# Patient Record
Sex: FEMALE | Race: WHITE | Employment: OTHER | URBAN - METROPOLITAN AREA
[De-identification: names, ages, dates, MRNs, and addresses within clinical notes are randomized per-mention and may not be internally consistent; named-entity substitution may affect disease eponyms.]

---

## 2017-01-09 ENCOUNTER — TELEPHONE (OUTPATIENT)
Dept: FAMILY MEDICINE CLINIC | Age: 77
End: 2017-01-09

## 2017-01-09 NOTE — TELEPHONE ENCOUNTER
----- Message from Anca Villa sent at 1/9/2017 12:44 PM EST -----  Regarding: Dr. Mateusz Sargent  Pt stated she needs a referral for an upper endoscopy  for 02/03/17 at 8:30 a.m. faxed to Dr. Hanna Fowler on file)  Best contact number [phone].

## 2017-01-25 ENCOUNTER — DOCUMENTATION ONLY (OUTPATIENT)
Dept: SLEEP MEDICINE | Age: 77
End: 2017-01-25

## 2017-01-26 ENCOUNTER — TELEPHONE (OUTPATIENT)
Dept: SLEEP MEDICINE | Age: 77
End: 2017-01-26

## 2017-01-26 NOTE — TELEPHONE ENCOUNTER
Spoke w/ patient regarding scheduled pressure adjustment for 1/27/17. She reports recently seeing Dr. Reji Richmond (Pulmonogist) for issues other than sleep. During that visit he requested her CPAP machine be adjusted to Auto CPAP 7cm-10cm (see media 12/18/16). It was also found that a follow-up with her sleep physician would be necessary. Patient has remote accessibility on her CPAP device. Per Dr. Berna Rojo request, pressure was adjusted to 7cm-10cm. Pressure adjustment appointment for 1/27/17 was canceled. We will reschedule a follow-up with her sleep physician at patients earliest convenience.

## 2017-01-27 NOTE — TELEPHONE ENCOUNTER
Spoke with patient about scheduling appointment for follow up. She said she wants to see how the pressure change goes for couple months and call us back to schedule office visit appointment.

## 2017-02-02 ENCOUNTER — TELEPHONE (OUTPATIENT)
Dept: SLEEP MEDICINE | Age: 77
End: 2017-02-02

## 2017-02-02 NOTE — TELEPHONE ENCOUNTER
Spoke w/ patient regarding adherence to CPAP since recent pressure adjustment. She feels she is sleeping much better \"in one position all night\". Wakes up feeling refreshed. Download reveals improved AHI, snore index, and reduce mask leak. · Patient wishes to continue with CPAP for appx. 2 months before scheduling a follow-up with Dr. Joseluis Hutson. · She was advised to contact the sleep center if she has any further complications with CPAP prior to her next appointment.

## 2017-03-07 ENCOUNTER — TELEPHONE (OUTPATIENT)
Dept: SLEEP MEDICINE | Age: 77
End: 2017-03-07

## 2017-03-07 NOTE — TELEPHONE ENCOUNTER
Called Ms Juan Mcleodeleazar to see if she wanted to schedule yearly f/u for cpap as she had not been since since 9/2015. Patient said she was find now and did not want to schedule one at this time. She said she orders her supplies online and she has had no problem so far. She said she will call if she need to make appointment.

## 2017-04-18 ENCOUNTER — OFFICE VISIT (OUTPATIENT)
Dept: FAMILY MEDICINE CLINIC | Age: 77
End: 2017-04-18

## 2017-04-18 VITALS
RESPIRATION RATE: 18 BRPM | OXYGEN SATURATION: 98 % | DIASTOLIC BLOOD PRESSURE: 80 MMHG | HEART RATE: 74 BPM | WEIGHT: 231 LBS | TEMPERATURE: 98.3 F | SYSTOLIC BLOOD PRESSURE: 129 MMHG | BODY MASS INDEX: 42.51 KG/M2 | HEIGHT: 62 IN

## 2017-04-18 DIAGNOSIS — M47.9 OSTEOARTHRITIS OF SPINE, UNSPECIFIED SPINAL OSTEOARTHRITIS COMPLICATION STATUS, UNSPECIFIED SPINAL REGION: ICD-10-CM

## 2017-04-18 DIAGNOSIS — Z13.31 SCREENING FOR DEPRESSION: ICD-10-CM

## 2017-04-18 DIAGNOSIS — M62.838 MUSCLE SPASM: ICD-10-CM

## 2017-04-18 DIAGNOSIS — M54.5 LOW BACK PAIN, UNSPECIFIED BACK PAIN LATERALITY, UNSPECIFIED CHRONICITY, WITH SCIATICA PRESENCE UNSPECIFIED: Primary | ICD-10-CM

## 2017-04-18 RX ORDER — METHYLPREDNISOLONE 4 MG/1
TABLET ORAL
Qty: 1 DOSE PACK | Refills: 0 | Status: SHIPPED | OUTPATIENT
Start: 2017-04-18 | End: 2017-12-19 | Stop reason: ALTCHOICE

## 2017-04-18 NOTE — PROGRESS NOTES
Chief Complaint   Patient presents with    Back Pain     back pain x 6- 7 months off and on, pain level 10/10     1. Have you been to the ER, urgent care clinic since your last visit? Hospitalized since your last visit? No    2. Have you seen or consulted any other health care providers outside of the [address] [place] [address] since your last visit? Include any pap smears or colon screening. No     Reviewed record in preparation for visit and have obtained necessary documentation.

## 2017-04-18 NOTE — PATIENT INSTRUCTIONS
Take medrol with food    Take tylenol for pain    Apply renetta camarena to your back    Moist heat    See back specialist Dr. Lexi Urbina #[phone]    Consider physical therapy    [facility] #[phone] or  [facility] #[phone]  Physical Therapy evaluate and treat:  Back pain    Please call Nanette to help arrange and authorize your tests and/or referrals.   Her # is [phone]

## 2017-04-18 NOTE — LETTER
4/18/2017 2:18 PM 
 
Ms. Parish Saba [address] [facility] [place] [facility] [id number] Body mass index is 42.25 kg/(m^2). Focus on regular exercise (150 minutes each week) and healthy eating. Eat more fruits and vegetables. Eat more protein (egg whites, beans, and nuts you know you tolerate) and less carbohydrates (white bread, white rice, white pasta, white potatoes, sodas, and sweets). Eat appropriately small portion sizes. Sincerely, Cecilio Philip MD

## 2017-04-18 NOTE — MR AVS SNAPSHOT
Visit Information Date & Time Provider Department Dept. Phone Encounter #  
 4/18/2017  2:15 PM Miller Chan MD [address] [facility] [phone] [id number] Your Appointments 4/18/2017  2:15 PM  
ROUTINE CARE with Miller Chan MD  
[address] [facility]) Appt Note: muscle spasms in back $0CP yd 04/17/17  
 [address] [place] [address]  
[phone]  
  
   
 [address] [phone] Upcoming Health Maintenance Date Due  
 MEDICARE YEARLY EXAM 12/7/2017 GLAUCOMA SCREENING Q2Y 8/9/2018 COLONOSCOPY 8/9/2021 DTaP/Tdap/Td series (2 - Td) 8/9/2026 Allergies as of 4/18/2017  Review Complete On: 4/18/2017 By: Miller Chan MD  
  
 Severity Noted Reaction Type Reactions Bactrim [Sulfamethoprim]  03/22/2012    Swelling Lipitor [Atorvastatin]  07/15/2010    Other (comments) Aches Roxicodone [Oxycodone]  03/22/2012    Swelling Tetanus-diphtheria Toxoids-td  07/15/2010    Swelling Allergic to Td? Welchol [Colesevelam]  07/15/2010    Other (comments) Aches Current Immunizations  Reviewed on 2/9/2016 Name Date Influenza Vaccine 9/22/2016, 10/12/2015, 10/18/2014 Influenza Vaccine Split 10/9/2012 Pneumococcal Conjugate (PCV-13) 9/22/2016 Pneumococcal Vaccine (Pcv) 10/9/2008, 2/2/1998 Not reviewed this visit You Were Diagnosed With   
  
 Codes Comments Low back pain, unspecified back pain laterality, unspecified chronicity, with sciatica presence unspecified    -  Primary ICD-10-CM: M54.5 ICD-9-CM: 724.2 Muscle spasm     ICD-10-CM: O16.456 ICD-9-CM: 728.85 Vitals BP Pulse Temp Resp Height(growth percentile) Weight(growth percentile) 129/80 (BP 1 Location: Left arm, BP Patient Position: Sitting) 74 98.3 °F (36.8 °C) (Oral) 18 5' 2\" (1.575 m) 231 lb (104.8 kg) LMP SpO2 BMI OB Status Smoking Status 05/19/1989 98% 42.25 kg/m2 Postmenopausal Former Smoker Vitals History BMI and BSA Data Body Mass Index Body Surface Area  
 42.25 kg/m 2 2.14 m 2 Preferred Pharmacy Pharmacy Name Phone [facility] [address] [phone] Your Updated Medication List  
  
   
This list is accurate as of: 4/18/17  2:11 PM.  Always use your most recent med list.  
  
  
  
  
 ALPRAZolam 0.5 mg tablet Commonly known as:  XANAX  
TAKE 1 TABLET BY MOUTH 3 TIMES A DAY as needed  
  
 amLODIPine 2.5 mg tablet Commonly known as:  Tamara Punter Take 1 Tab by mouth daily. Indications: Hypertension  
  
 aspirin 81 mg chewable tablet Take 1 Tab by mouth daily. cholecalciferol 1,000 unit Cap Commonly known as:  VITAMIN D3 Take 2,000 Units by mouth daily. FLUoxetine 10 mg capsule Commonly known as:  PROzac TAKE 1 CAPSULE BY MOUTH EVERY DAY  
  
 fluticasone 50 mcg/actuation nasal spray Commonly known as:  Bill Settles INHALE 2 PUFFS INTO EACH NOSTRIL DAILY INCRUSE ELLIPTA 62.5 mcg/actuation inhaler Generic drug:  umeclidinium Take 1 Puff by mouth daily. losartan 100 mg tablet Commonly known as:  COZAAR  
TAKE 1 TABLET BY MOUTH DAILY. methylPREDNISolone 4 mg tablet Commonly known as:  Li Everettall Take with food MULTIPLE VITAMIN PO Take  by mouth.  
  
 pantoprazole 40 mg tablet Commonly known as:  PROTONIX Take 1 Tab by mouth daily. TYLENOL ARTHRITIS PAIN 650 mg CR tablet Generic drug:  acetaminophen Take 650 mg by mouth four (4) times daily. Prescriptions Sent to Pharmacy Refills  
 methylPREDNISolone (MEDROL DOSEPACK) 4 mg tablet 0 Sig: Take with food Class: Normal  
 Pharmacy: [facility] [facility] Ph #: [phone] We Performed the Following REFERRAL TO ORTHOPEDIC SURGERY [REF62 Custom] Comments: Please evaluate patient for back pain. REFERRAL TO PHYSICAL THERAPY [MND12 Custom] Comments:  
 Please evaluate patient for back pain. Please call Adela Abraham to help arrange and authorize any tests and/or referrals. Her # is [phone] Referral Information Referral ID Referred By Referred To  
  
 [id number] Delfina Pillai of [address] [place], [address] [place] [address] Phone: [phone] Fax: [phone] Visits Status Start Date End Date 1 New Request 4/18/17 4/18/18 If your referral has a status of pending review or denied, additional information will be sent to support the outcome of this decision. Referral ID Referred By Referred To  
 [id number] Samuel Cardenas Not Available Visits Status Start Date End Date 1 New Request 4/18/17 4/18/18 If your referral has a status of pending review or denied, additional information will be sent to support the outcome of this decision. Patient Instructions Take medrol with food Take tylenol for pain Apply renetta camarena to your back Moist heat See back specialist  [facility] #[phone] Consider physical therapy [facility] #[phone] or [facility] #[phone] Physical Therapy evaluate and treat:  Back pain Please call Adela Abraham to help arrange and authorize your tests and/or referrals. Her # is [phone] Introducing \[facility]\"" & HEALTH SERVICES! Juancho Butterfield introduces [employer] patient portal. Now you can access parts of your medical record, email your doctor's office, and request medication refills online. 1. In your internet browser, go to [email or web address] 2. Click on the First Time User? Click Here link in the Sign In box. You will see the New Member Sign Up page. 3. Enter your [employer] Access Code exactly as it appears below. You will not need to use this code after youve completed the sign-up process.  If you do not sign up before the expiration date, you must request a new code. · [employer] Access Code: [email or web address] Expires: 7/17/2017  2:11 PM 
 
4. Enter the last four digits of your Social Security Number (xxxx) and Date of Birth (mm/dd/yyyy) as indicated and click Submit. You will be taken to the next sign-up page. 5. Create a [employer] ID. This will be your [employer] login ID and cannot be changed, so think of one that is secure and easy to remember. 6. Create a [employer] password. You can change your password at any time. 7. Enter your Password Reset Question and Answer. This can be used at a later time if you forget your password. 8. Enter your e-mail address. You will receive e-mail notification when new information is available in [address] 9. Click Sign Up. You can now view and download portions of your medical record. 10. Click the Download Summary menu link to download a portable copy of your medical information. If you have questions, please visit the Frequently Asked Questions section of the [employer] website. Remember, [employer] is NOT to be used for urgent needs. For medical emergencies, dial 911. Now available from your iPhone and Android! Please provide this summary of care documentation to your next provider. Your primary care clinician is listed as Naif Cash. If you have any questions after today's visit, please call [phone].

## 2017-04-18 NOTE — PROGRESS NOTES
Reports dislocating right shoulder about 1.5 hours ago. States she put it back in, but the pain is still 6/10 after taking ibuprofen at 1300   Shiv Galo is a 78 y.o. female      Issues discussed today include:        Signs and symptoms:  Low back pain  Duration:  3 days  Context:  No falls but she was leaning over the sink peeling potatoes  Location:  Low back  Quality:  spasm  Severity:  8/10  Timing:  dialy for 3 days  Modifying factors:  She's had back issues before, did not respond to PT    Data reviewed or ordered today:  XR 12/2016  Frontal, lateral, and lumbosacral radiographs. Lumbar is age of S1 is a normal  variant. Lumbar alignment and vertebral body heights are normal. Degenerative  disc disease is mild, and greatest at L1-L2. L3-S1 facet osteoarthritis is mild. Mild degenerative disease is minimally increased from 2006.     IMPRESSION  IMPRESSION:  Mild degenerative disease. Other problems include:  Patient Active Problem List   Diagnosis Code    Pseudogout M11.20    Depression F32.9    GERD (gastroesophageal reflux disease) K21.9    ALESIA (obstructive sleep apnea) G47.33    Chest pain R07.9    History of normal resting EKG [id number]    Colon polyp K63.5    S/P JESSICA-BSO Z90.710, Z90.722, Z90.79    Essential hypertension with goal blood pressure less than 140/90 I10    Hyperglycemia R73.9    Knee pain, right M25.561    Osteoarthritis M19.90    Lipid disorder E78.9    Osteoporosis M81.0    Advance directive discussed with patient Z70.80    Health care maintenance Z00.00    Urinary incontinence R32    Anxiety F41.9    Hypercalcemia E83.52    Allergic to tetanus vaccine Z88.7       Medications:  Current Outpatient Prescriptions   Medication Sig Dispense Refill    methylPREDNISolone (MEDROL DOSEPACK) 4 mg tablet Take with food 1 Dose Pack 0    ALPRAZolam (XANAX) 0.5 mg tablet TAKE 1 TABLET BY MOUTH 3 TIMES A DAY as needed 60 Tab 1    losartan (COZAAR) 100 mg tablet TAKE 1 TABLET BY MOUTH DAILY. 90 Tab 3    amLODIPine (NORVASC) 2.5 mg tablet Take 1 Tab by mouth daily.  Indications: Hypertension 90 Tab 3    pantoprazole (PROTONIX) 40 mg tablet Take 1 Tab by mouth daily. 90 Tab 3    FLUoxetine (PROZAC) 10 mg capsule TAKE 1 CAPSULE BY MOUTH EVERY DAY 90 Cap 3    fluticasone (FLONASE) 50 mcg/actuation nasal spray INHALE 2 PUFFS INTO EACH NOSTRIL DAILY 1 Bottle 11    INCRUSE ELLIPTA 62.5 mcg/actuation inhaler Take 1 Puff by mouth daily. 6    cholecalciferol (VITAMIN D3) 1,000 unit cap Take 2,000 Units by mouth daily.  acetaminophen (TYLENOL ARTHRITIS PAIN) 650 mg CR tablet Take 650 mg by mouth four (4) times daily.  aspirin 81 mg chewable tablet Take 1 Tab by mouth daily. 100 Tab 11    MULTIVITAMINS (MULTIPLE VITAMIN PO) Take  by mouth. Allergies: Allergies   Allergen Reactions    Bactrim [Sulfamethoprim] Swelling    Lipitor [Atorvastatin] Other (comments)     Aches      Roxicodone [Oxycodone] Swelling    Tetanus-Diphtheria Toxoids-Td Swelling     Allergic to Td?  Welchol [Colesevelam] Other (comments)     Aches         LMP:  Patient's last menstrual period was 05/19/1989. Social History     Social History    Marital status: SINGLE     Spouse name: N/A    Number of children: N/A    Years of education: N/A     Occupational History    Not on file.      Social History Main Topics    Smoking status: Former Smoker     Packs/day: 0.50     Quit date: 9/7/2010    Smokeless tobacco: Never Used    Alcohol use No      Comment: RARE    Drug use: No    Sexual activity: No     Other Topics Concern    Not on file     Social History Narrative         Family History   Problem Relation Age of Onset    Diabetes Father     Arthritis-rheumatoid Father     Heart Disease Father     Cancer Maternal Aunt     Breast Cancer Maternal Aunt     Cancer Other     Breast Cancer Sister     Breast Cancer Maternal Aunt          Meaningful use:  done      ROS:  Headaches:  no  Chest Pain:  no  SOB:  no  Fevers:  no  Falls:  no  anxiety/depression/losing interest in doing things that were previously enjoyed:  PHQ9 = 8, she declined adjusting Rx  Other significant ROS:      Patient's last menstrual period was 05/19/1989.     Physical Exam  Visit Vitals    /80 (BP 1 Location: Left arm, BP Patient Position: Sitting)    Pulse 74    Temp 98.3 °F (36.8 °C) (Oral)    Resp 18    Ht 5' 2\" (1.575 m)    Wt 231 lb (104.8 kg)    LMP 05/19/1989    SpO2 98%    BMI 42.25 kg/m2     BP Readings from Last 3 Encounters:   04/18/17 129/80   12/06/16 184/78   10/22/16 126/76     Constitutional:  Appears well,  No Acute Distress, Vitals noted  Psychiatric:   Affect normal, Alert and cooperative, Oriented to person/place/time    no tenderness over C-spine, T-spine, L-spine    Only fair flexibility of spine demonstrated     strength of left leg and right leg seem normal    squats OK, heel standing/toe standing OK given pain    no tenderness over right or left SI joint    no tenderness over left or right trochanteric bursa     straight leg raising is normal for right and left leg    full range of motion at both hips without pain    abdominal exam shows no bruits or masses            Assessment:    Patient Active Problem List   Diagnosis Code    Pseudogout M11.20    Depression F32.9    GERD (gastroesophageal reflux disease) K21.9    ALESIA (obstructive sleep apnea) G47.33    Chest pain R07.9    History of normal resting EKG [id number]    Colon polyp K63.5    S/P JESSICA-BSO Z90.710, Z90.722, Z90.79    Essential hypertension with goal blood pressure less than 140/90 I10    Hyperglycemia R73.9    Knee pain, right M25.561    Osteoarthritis M19.90    Lipid disorder E78.9    Osteoporosis M81.0    Advance directive discussed with patient Z70.80    Health care maintenance Z00.00    Urinary incontinence R32    Anxiety F41.9    Hypercalcemia E83.52    Allergic to tetanus vaccine Z88.7       Today's diagnoses are:    ICD-10-CM ICD-9-CM    1. Low back pain, unspecified back pain laterality, unspecified chronicity, with sciatica presence unspecified M54.5 724.2 REFERRAL TO ORTHOPEDIC SURGERY      REFERRAL TO PHYSICAL THERAPY   2. Muscle spasm M62.838 728.85    3. Osteoarthritis of spine, unspecified spinal osteoarthritis complication status, unspecified spinal region M47.9 721.90        Plan:  Orders Placed This Encounter    REFERRAL TO ORTHOPEDIC SURGERY     Referral Priority:   Routine     Referral Type:   Consultation     Referral Reason:   Specialty Services Required     Referral Location:   [facility]     Referred to Provider:   Aung Shannon MD     Requested Specialty:   Orthopedic Surgery    REFERRAL TO PHYSICAL THERAPY     Referral Priority:   Routine     Referral Type:   PT/OT/ST     Referral Reason:   Specialty Services Required     Requested Specialty:   Physical Therapy    methylPREDNISolone (MEDROL DOSEPACK) 4 mg tablet     Sig: Take with food     Dispense:  1 Dose Pack     Refill:  0       See patient instructions  Patient Instructions   Take medrol with food    Take tylenol for pain    Apply renetta camarena to your back    Moist heat    See back specialist Dr. Ruslan Talbert #[phone]    Consider physical therapy    [facility] #[phone] or  [facility] #[phone]  Physical Therapy evaluate and treat:  Back pain    Please call Nanette to help arrange and authorize your tests and/or referrals. Her # le [phone]             refresh note:  done    AVS Printed:  done    I have reviewed/discussed the above normal BMI with the patient. I have recommended the following interventions: encourage exercise . [place] [facility]

## 2017-04-27 ENCOUNTER — TELEPHONE (OUTPATIENT)
Dept: FAMILY MEDICINE CLINIC | Age: 77
End: 2017-04-27

## 2017-04-27 NOTE — TELEPHONE ENCOUNTER
----- Message from Melanie Holman sent at 4/27/2017 10:28 AM EDT -----  Regarding: Dr.Jackson Samuel/telephone  Contact: [phone]  Pt is requesting a call back to get a referral for Dr. Griselda Joseph [phone]. Pt has appt on 5/3/17. Pt's best contact number is [phone]

## 2017-05-04 ENCOUNTER — TELEPHONE (OUTPATIENT)
Dept: FAMILY MEDICINE CLINIC | Age: 77
End: 2017-05-04

## 2017-05-04 NOTE — TELEPHONE ENCOUNTER
----- Message from Katherine King sent at 5/4/2017 11:43 AM EDT -----  Regarding: Dr. Cecilio Floyd  Pt requested a referral to JULISA SHAW [facility](pain mgmt) for an appt on 05/17/17 at 8:00 a.m. Faxed to . Best contact number [phone].

## 2017-05-16 ENCOUNTER — OFFICE VISIT (OUTPATIENT)
Dept: FAMILY MEDICINE CLINIC | Age: 77
End: 2017-05-16

## 2017-05-16 VITALS
HEART RATE: 86 BPM | SYSTOLIC BLOOD PRESSURE: 149 MMHG | RESPIRATION RATE: 18 BRPM | TEMPERATURE: 98.5 F | BODY MASS INDEX: 42.91 KG/M2 | HEIGHT: 62 IN | WEIGHT: 233.2 LBS | DIASTOLIC BLOOD PRESSURE: 80 MMHG | OXYGEN SATURATION: 92 %

## 2017-05-16 DIAGNOSIS — J01.10 ACUTE NON-RECURRENT FRONTAL SINUSITIS: Primary | ICD-10-CM

## 2017-05-16 DIAGNOSIS — M54.5 LOW BACK PAIN, UNSPECIFIED BACK PAIN LATERALITY, UNSPECIFIED CHRONICITY, WITH SCIATICA PRESENCE UNSPECIFIED: Primary | ICD-10-CM

## 2017-05-16 RX ORDER — AMOXICILLIN AND CLAVULANATE POTASSIUM 875; 125 MG/1; MG/1
1 TABLET, FILM COATED ORAL EVERY 12 HOURS
Qty: 20 TAB | Refills: 0 | Status: SHIPPED | OUTPATIENT
Start: 2017-05-16 | End: 2017-05-26

## 2017-05-16 NOTE — MR AVS SNAPSHOT
Visit Information Date & Time Provider Department Dept. Phone Encounter #  
 5/16/2017  6:15 PM Bautista Braun, [address] [phone] [id number] Follow-up Instructions Return if symptoms worsen or fail to improve. Upcoming Health Maintenance Date Due INFLUENZA AGE 9 TO ADULT 8/1/2017 MEDICARE YEARLY EXAM 12/7/2017 GLAUCOMA SCREENING Q2Y 8/9/2018 COLONOSCOPY 8/9/2021 DTaP/Tdap/Td series (2 - Td) 8/9/2026 Allergies as of 5/16/2017  Review Complete On: 5/16/2017 By: Bautista Braun MD  
  
 Severity Noted Reaction Type Reactions Bactrim [Sulfamethoprim]  03/22/2012    Swelling Lipitor [Atorvastatin]  07/15/2010    Other (comments) Aches Roxicodone [Oxycodone]  03/22/2012    Swelling Tetanus-diphtheria Toxoids-td  07/15/2010    Swelling Allergic to Td? Welchol [Colesevelam]  07/15/2010    Other (comments) Aches Current Immunizations  Reviewed on 2/9/2016 Name Date Influenza Vaccine 9/22/2016, 10/12/2015, 10/18/2014 Influenza Vaccine Split 10/9/2012 Pneumococcal Conjugate (PCV-13) 9/22/2016 Pneumococcal Vaccine (Pcv) 10/9/2008, 2/2/1998 Not reviewed this visit You Were Diagnosed With   
  
 Codes Comments Acute non-recurrent frontal sinusitis    -  Primary ICD-10-CM: J01.10 ICD-9-CM: 863.6 Vitals BP Pulse Temp Resp Height(growth percentile) Weight(growth percentile) 149/80 (BP 1 Location: Left arm, BP Patient Position: Sitting) 86 98.5 °F (36.9 °C) (Oral) 18 5' 2\" (1.575 m) 233 lb 3.2 oz (105.8 kg) LMP SpO2 BMI OB Status Smoking Status 05/19/1989 92% 42.65 kg/m2 Postmenopausal Former Smoker Vitals History BMI and BSA Data Body Mass Index Body Surface Area  
 42.65 kg/m 2 2.15 m 2 Preferred Pharmacy Pharmacy Name Phone [facility] [address] [phone] Your Updated Medication List  
  
   
 This list is accurate as of: 5/16/17  6:56 PM.  Always use your most recent med list.  
  
  
  
  
 ALPRAZolam 0.5 mg tablet Commonly known as:  XANAX  
TAKE 1 TABLET BY MOUTH 3 TIMES A DAY as needed  
  
 amLODIPine 2.5 mg tablet Commonly known as:  Bere Rafter Take 1 Tab by mouth daily. Indications: Hypertension  
  
 amoxicillin-clavulanate 875-125 mg per tablet Commonly known as:  AUGMENTIN Take 1 Tab by mouth every twelve (12) hours for 10 days. aspirin 81 mg chewable tablet Take 1 Tab by mouth daily. cholecalciferol 1,000 unit Cap Commonly known as:  VITAMIN D3 Take 2,000 Units by mouth daily. FLUoxetine 10 mg capsule Commonly known as:  PROzac TAKE 1 CAPSULE BY MOUTH EVERY DAY  
  
 fluticasone 50 mcg/actuation nasal spray Commonly known as:  Chales Copa INHALE 2 PUFFS INTO EACH NOSTRIL DAILY INCRUSE ELLIPTA 62.5 mcg/actuation inhaler Generic drug:  umeclidinium Take 1 Puff by mouth daily. losartan 100 mg tablet Commonly known as:  COZAAR  
TAKE 1 TABLET BY MOUTH DAILY. methylPREDNISolone 4 mg tablet Commonly known as:  [place]adoo Banana Take with food MULTIPLE VITAMIN PO Take  by mouth.  
  
 pantoprazole 40 mg tablet Commonly known as:  PROTONIX Take 1 Tab by mouth daily. TYLENOL ARTHRITIS PAIN 650 mg CR tablet Generic drug:  acetaminophen Take 650 mg by mouth four (4) times daily. Prescriptions Sent to Pharmacy Refills  
 amoxicillin-clavulanate (AUGMENTIN) 875-125 mg per tablet 0 Sig: Take 1 Tab by mouth every twelve (12) hours for 10 days. Class: Normal  
 Pharmacy: [facility], [address] Ph #: [phone] Route: Oral  
  
Follow-up Instructions Return if symptoms worsen or fail to improve. Introducing [facility] & HEALTH SERVICES!    
 Sharon Patel introduces [employer] patient portal. Now you can access parts of your medical record, email your doctor's office, and request medication refills online. 1. In your internet browser, go to [email or web address] 2. Click on the First Time User? Click Here link in the Sign In box. You will see the New Member Sign Up page. 3. Enter your [employer]ument[employer] Access Code exactly as it appears below. You will not need to use this code after youve completed the sign-up process. If you do not sign up before the expiration date, you must request a new code. · [employer] Access Code: [email or web address] Expires: 7/17/2017  2:11 PM 
 
4. Enter the last four digits of your Social Security Number (xxxx) and Date of Birth (mm/dd/yyyy) as indicated and click Submit. You will be taken to the next sign-up page. 5. Create a Volumental ID. This will be your Volumental login ID and cannot be changed, so think of one that is secure and easy to remember. 6. Create a [employer] password. You can change your password at any time. 7. Enter your Password Reset Question and Answer. This can be used at a later time if you forget your password. 8. Enter your e-mail address. You will receive e-mail notification when new information is available in [address] 9. Click Sign Up. You can now view and download portions of your medical record. 10. Click the Download Summary menu link to download a portable copy of your medical information. If you have questions, please visit the Frequently Asked Questions section of the [employer] website. Remember, [employer] is NOT to be used for urgent needs. For medical emergencies, dial 911. Now available from your iPhone and Android! Please provide this summary of care documentation to your next provider. Your primary care clinician is listed as Dunia Marinelli. If you have any questions after today's visit, please call [phone].

## 2017-05-16 NOTE — PROGRESS NOTES
Edi Rosario is an 78 y.o. female who presents for   Chief Complaint   Patient presents with    Sinus Infection     since firday face swollen, flush, hot, pressure, left nostril it drain and right nostril it bleeds, and terrible headache. Frontal sinus pain. Rash on cheeks. Green discharge from R nostril, blood from L nostril. Hot face. No fever. Has tried daily sinus rinse, flonase, antihistamine without relief. Allergies - reviewed: Allergies   Allergen Reactions    Bactrim [Sulfamethoprim] Swelling    Lipitor [Atorvastatin] Other (comments)     Aches      Roxicodone [Oxycodone] Swelling    Tetanus-Diphtheria Toxoids-Td Swelling     Allergic to Td?  Welchol [Colesevelam] Other (comments)     Aches           Medications - reviewed:   Current Outpatient Prescriptions   Medication Sig    amoxicillin-clavulanate (AUGMENTIN) 875-125 mg per tablet Take 1 Tab by mouth every twelve (12) hours for 10 days.  methylPREDNISolone (MEDROL DOSEPACK) 4 mg tablet Take with food    ALPRAZolam (XANAX) 0.5 mg tablet TAKE 1 TABLET BY MOUTH 3 TIMES A DAY as needed    losartan (COZAAR) 100 mg tablet TAKE 1 TABLET BY MOUTH DAILY.  amLODIPine (NORVASC) 2.5 mg tablet Take 1 Tab by mouth daily. Indications: Hypertension    pantoprazole (PROTONIX) 40 mg tablet Take 1 Tab by mouth daily.  FLUoxetine (PROZAC) 10 mg capsule TAKE 1 CAPSULE BY MOUTH EVERY DAY    fluticasone (FLONASE) 50 mcg/actuation nasal spray INHALE 2 PUFFS INTO EACH NOSTRIL DAILY    INCRUSE ELLIPTA 62.5 mcg/actuation inhaler Take 1 Puff by mouth daily.  cholecalciferol (VITAMIN D3) 1,000 unit cap Take 2,000 Units by mouth daily.  acetaminophen (TYLENOL ARTHRITIS PAIN) 650 mg CR tablet Take 650 mg by mouth four (4) times daily.  aspirin 81 mg chewable tablet Take 1 Tab by mouth daily.  MULTIVITAMINS (MULTIPLE VITAMIN PO) Take  by mouth. No current facility-administered medications for this visit.           Past Medical History - reviewed:  Past Medical History:   Diagnosis Date    Current smoker     4 cig pd    Depression 7/15/2010    GERD (gastroesophageal reflux disease) 7/15/2010    High cholesterol 7/15/2010    HTN (hypertension)     Obesity (BMI 30-39. 9)     ALESIA (obstructive sleep apnea) 7/15/2010    Osteopenia 7/15/2010    Pseudogout 7/15/2010         Past Surgical History - reviewed:   Past Surgical History:   Procedure Laterality Date    ENDOSCOPY, COLON, DIAGNOSTIC  2006    Dr. eBe Lucio HX GYN  late 19's    JESSICA, one ovary intact, due to bleeding    HX ORTHOPAEDIC      right wrist and neck    HX UROLOGICAL  2011    Bladder sling - Dr. Júnior Gray History - reviewed:  Social History     Social History    Marital status: SINGLE     Spouse name: N/A    Number of children: N/A    Years of education: N/A     Occupational History    Not on file.      Social History Main Topics    Smoking status: Former Smoker     Packs/day: 0.50     Quit date: 9/7/2010    Smokeless tobacco: Never Used    Alcohol use No      Comment: RARE    Drug use: No    Sexual activity: No     Other Topics Concern    Not on file     Social History Narrative         Family History - reviewed:  Family History   Problem Relation Age of Onset    Diabetes Father     Arthritis-rheumatoid Father     Heart Disease Father     Cancer Maternal Aunt     Breast Cancer Maternal Aunt     Cancer Other     Breast Cancer Sister     Breast Cancer Maternal Aunt          Immunizations - reviewed:   Immunization History   Administered Date(s) Administered    Influenza Vaccine 10/18/2014, 10/12/2015, 09/22/2016    Influenza Vaccine Split 10/09/2012    Pneumococcal Conjugate (PCV-13) 09/22/2016    Pneumococcal Vaccine (Pcv) 02/02/1998, 10/09/2008         ROS  CONSTITUTIONAL: Denies: fever  ENT: nasal congestion, nasal discharge, facial pain  CARDIOVASCULAR: Denies: chest pain  RESPIRATORY: Denies: cough, shortness of breath  Other comprehensive ROS negative. Physical Exam  Visit Vitals    /80 (BP 1 Location: Left arm, BP Patient Position: Sitting)    Pulse 86    Temp 98.5 °F (36.9 °C) (Oral)    Resp 18    Ht 5' 2\" (1.575 m)    Wt 233 lb 3.2 oz (105.8 kg)    LMP 05/19/1989    SpO2 92%    BMI 42.65 kg/m2       General appearance - alert, ill-appearing, cooperative  Eyes - EOMI  Nose - mucosal congestion, mucosal erythema, purulent rhinorrhea and sinus tenderness noted frontal, maxillary  Mouth - mucous membranes moist, pharynx normal without lesions  Neck - supple, no significant adenopathy  Chest - clear to auscultation, no wheezes, rales or rhonchi, symmetric air entry  Heart - normal rate, regular rhythm, normal S1, S2, no murmurs, rubs, clicks or gallops  Neurological - alert, oriented, normal speech, no focal findings or movement disorder noted  Extremities - peripheral pulses normal, no pedal edema, no clubbing or cyanosis  Skin - erythema b/l cheeks      Assessment/Plan    ICD-10-CM ICD-9-CM    1. Acute non-recurrent frontal sinusitis J01.10 461.1 amoxicillin-clavulanate (AUGMENTIN) 875-125 mg per tablet       Given 5 days of symptoms and lack of fever, leaning more toward viral etiology for sinusitis despite purulent, bloody discharge from nostrils. While discussing this with patient, she interrupted me and angrily stated that she needed antibiotics and refused any other options. I told her the risks of antibiotic treatment when not clinically indicated but she insisted that she receive an antibiotic. Will treat with augmentin for now but if she were to come again and be aggressive regarding treatment decisions, will have to have her see another physician. Follow-up Disposition:  Return if symptoms worsen or fail to improve. I have discussed the diagnosis with the patient and the intended plan as seen in the above orders.   The patient has received an after-visit summary and questions were answered concerning future plans. I have discussed medication side effects and warnings with the patient as well. Desean Vargas MD  Family Medicine Resident    Patient discussed with Dr. Jass Rm, attending physician.

## 2017-05-16 NOTE — PROGRESS NOTES
Chief Complaint   Patient presents with    Sinus Infection     since firday face swollen, flush, hot, pressure, left nostril it drain and right nostril it bleeds, and terrible headache.

## 2017-05-30 ENCOUNTER — OFFICE VISIT (OUTPATIENT)
Dept: FAMILY MEDICINE CLINIC | Age: 77
End: 2017-05-30

## 2017-05-30 VITALS
DIASTOLIC BLOOD PRESSURE: 82 MMHG | OXYGEN SATURATION: 93 % | WEIGHT: 232.6 LBS | HEART RATE: 80 BPM | BODY MASS INDEX: 42.8 KG/M2 | SYSTOLIC BLOOD PRESSURE: 130 MMHG | HEIGHT: 62 IN | TEMPERATURE: 98 F | RESPIRATION RATE: 17 BRPM

## 2017-05-30 DIAGNOSIS — L98.9 SKIN LESION OF FACE: ICD-10-CM

## 2017-05-30 DIAGNOSIS — B37.9 MONILIA INFECTION: ICD-10-CM

## 2017-05-30 DIAGNOSIS — Z13.31 SCREENING FOR DEPRESSION: ICD-10-CM

## 2017-05-30 DIAGNOSIS — N89.8 ITCHING IN THE VAGINAL AREA: Primary | ICD-10-CM

## 2017-05-30 DIAGNOSIS — N39.0 URINARY TRACT INFECTION WITHOUT HEMATURIA, SITE UNSPECIFIED: ICD-10-CM

## 2017-05-30 LAB
BILIRUB UR QL STRIP: NEGATIVE
GLUCOSE UR-MCNC: NEGATIVE MG/DL
KETONES P FAST UR STRIP-MCNC: NEGATIVE MG/DL
PH UR STRIP: 5.5 [PH] (ref 4.6–8)
PROT UR QL STRIP: NORMAL MG/DL
SP GR UR STRIP: 1.01 (ref 1–1.03)
UA UROBILINOGEN AMB POC: NORMAL (ref 0.2–1)
URINALYSIS CLARITY POC: CLEAR
URINALYSIS COLOR POC: YELLOW
URINE BLOOD POC: NORMAL
URINE LEUKOCYTES POC: NORMAL
URINE NITRITES POC: NEGATIVE

## 2017-05-30 RX ORDER — CYCLOBENZAPRINE HCL 10 MG
TABLET ORAL
COMMUNITY
End: 2017-12-19

## 2017-05-30 RX ORDER — CEFDINIR 300 MG/1
300 CAPSULE ORAL DAILY
Qty: 7 CAP | Refills: 0 | Status: SHIPPED | OUTPATIENT
Start: 2017-05-30 | End: 2017-06-06

## 2017-05-30 RX ORDER — FLUCONAZOLE 150 MG/1
150 TABLET ORAL DAILY
Qty: 1 TAB | Refills: 1 | Status: SHIPPED | OUTPATIENT
Start: 2017-05-30 | End: 2017-05-31

## 2017-05-30 NOTE — MR AVS SNAPSHOT
Visit Information Date & Time Provider Department Dept. Phone Encounter #  
 5/30/2017  3:15 PM Denver Cumins, MD [facility] [phone] [id number] Upcoming Health Maintenance Date Due INFLUENZA AGE 9 TO ADULT 8/1/2017 MEDICARE YEARLY EXAM 12/7/2017 GLAUCOMA SCREENING Q2Y 8/9/2018 COLONOSCOPY 8/9/2021 DTaP/Tdap/Td series (2 - Td) 8/9/2026 Allergies as of 5/30/2017  Review Complete On: 5/30/2017 By: Denver Cumins, MD  
  
 Severity Noted Reaction Type Reactions Bactrim [Sulfamethoprim]  03/22/2012    Swelling Lipitor [Atorvastatin]  07/15/2010    Other (comments) Aches Roxicodone [Oxycodone]  03/22/2012    Swelling Tetanus-diphtheria Toxoids-td  07/15/2010    Swelling Allergic to Td? Welchol [Colesevelam]  07/15/2010    Other (comments) Aches Current Immunizations  Reviewed on 2/9/2016 Name Date Influenza Vaccine 9/22/2016, 10/12/2015, 10/18/2014 Influenza Vaccine Split 10/9/2012 Pneumococcal Conjugate (PCV-13) 9/22/2016 Pneumococcal Vaccine (Pcv) 10/9/2008, 2/2/1998 Not reviewed this visit You Were Diagnosed With   
  
 Codes Comments Itching in the vaginal area    -  Primary ICD-10-CM: L29.8 ICD-9-CM: 698.1 Monilia infection     ICD-10-CM: B37.9 ICD-9-CM: 112.9 Urinary tract infection without hematuria, site unspecified     ICD-10-CM: N39.0 ICD-9-CM: 599.0 Vitals BP Pulse Temp Resp Height(growth percentile) Weight(growth percentile) 130/82 (BP 1 Location: Right arm, BP Patient Position: Sitting) 80 98 °F (36.7 °C) (Oral) 17 5' 2\" (1.575 m) 232 lb 9.6 oz (105.5 kg) LMP SpO2 BMI OB Status Smoking Status 05/19/1989 93% 42.54 kg/m2 Postmenopausal Former Smoker Vitals History BMI and BSA Data Body Mass Index Body Surface Area 42.54 kg/m 2 2.15 m 2 Preferred Pharmacy Pharmacy Name Phone [facility] [address], [address] [facility] [phone] Your Updated Medication List  
  
   
This list is accurate as of: 5/30/17  4:12 PM.  Always use your most recent med list.  
  
  
  
  
 ALPRAZolam 0.5 mg tablet Commonly known as:  XANAX  
TAKE 1 TABLET BY MOUTH 3 TIMES A DAY as needed  
  
 amLODIPine 2.5 mg tablet Commonly known as:  Swati Lowe Take 1 Tab by mouth daily. Indications: Hypertension  
  
 aspirin 81 mg chewable tablet Take 1 Tab by mouth daily. cefdinir 300 mg capsule Commonly known as:  OMNICEF Take 1 Cap by mouth daily for 7 days. cholecalciferol 1,000 unit Cap Commonly known as:  VITAMIN D3 Take 2,000 Units by mouth daily. cyclobenzaprine 10 mg tablet Commonly known as:  FLEXERIL Take  by mouth three (3) times daily as needed for Muscle Spasm(s). fluconazole 150 mg tablet Commonly known as:  DIFLUCAN Take 1 Tab by mouth daily for 1 day. FDA advises cautious prescribing of oral fluconazole in pregnancy. FLUoxetine 10 mg capsule Commonly known as:  PROzac TAKE 1 CAPSULE BY MOUTH EVERY DAY  
  
 fluticasone 50 mcg/actuation nasal spray Commonly known as:  Creasie Cockayne INHALE 2 PUFFS INTO EACH NOSTRIL DAILY INCRUSE ELLIPTA 62.5 mcg/actuation inhaler Generic drug:  umeclidinium Take 1 Puff by mouth daily. losartan 100 mg tablet Commonly known as:  COZAAR  
TAKE 1 TABLET BY MOUTH DAILY. methylPREDNISolone 4 mg tablet Commonly known as:  Irais Pepito Take with food MULTIPLE VITAMIN PO Take  by mouth.  
  
 pantoprazole 40 mg tablet Commonly known as:  PROTONIX Take 1 Tab by mouth daily. TYLENOL ARTHRITIS PAIN 650 mg CR tablet Generic drug:  acetaminophen Take 650 mg by mouth four (4) times daily. Prescriptions Sent to Pharmacy  Refills  
 fluconazole (DIFLUCAN) 150 mg tablet 1  
 Sig: Take 1 Tab by mouth daily for 1 day. FDA advises cautious prescribing of oral fluconazole in pregnancy. Class: Normal  
 Pharmacy: [address] #: [phone] Route: Oral  
 cefdinir (OMNICEF) 300 mg capsule 0 Sig: Take 1 Cap by mouth daily for 7 days. Class: Normal  
 Pharmacy: [address] Ph #: [phone] Route: Oral  
  
We Performed the Following AMB POC URINALYSIS DIP STICK AUTO W/ MICRO [71362 CPT(R)] Patient Instructions Take cefdinir one pill daily for 7 days ALSO take diflucan one pill now and one pill in one week Introducing [facility] & HE[facility] SERVICES! [employer] introduces [employer] patient portal. Now you can access parts of your medical record, email your doctor's office, and request medication refills online. 1. In your internet browser, go to [email or web address] 2. Click on the First Time User? Click Here link in the Sign In box. You will see the New Member Sign Up page. 3. Enter your [employer] Access Code exactly as it appears below. You will not need to use this code after youve completed the sign-up process. If you do not sign up before the expiration date, you must request a new code. · [employer] Access Code: [email or web address] Expires: 7/17/2017  2:11 PM 
 
4. Enter the last four digits of your Social Security Number (xxxx) and Date of Birth (mm/dd/yyyy) as indicated and click Submit. You will be taken to the next sign-up page. 5. Create a [employer] ID. This will be your Qazzow login ID and cannot be changed, so think of one that is secure and easy to remember. 6. Create a [employer]azzow password. You can change your password at any time. 7. Enter your Password Reset Question and Answer. This can be used at a later time if you forget your password. 8. Enter your e-mail address.  You will receive e-mail notification when new information is available in [employer]ist. 9. Click Sign Up. You can now view and download portions of your medical record. 10. Click the Download Summary menu link to download a portable copy of your medical information. If you have questions, please visit the Frequently Asked Questions section of the [employer] website. Remember, [employer] is NOT to be used for urgent needs. For medical emergencies, dial 911. Now available from your iPhone and Android! Please provide this summary of care documentation to your next provider. Your primary care clinician is listed as Marcin Sylvester. If you have any questions after today's visit, please call [phone].

## 2017-05-30 NOTE — PROGRESS NOTES
Chief Complaint   Patient presents with    Urinary Burning     patient believes she has a UTI,patient just finished antibiotic    Vaginal Itching

## 2017-05-30 NOTE — PROGRESS NOTES
Kee Hernández is a 78 y.o. female      Issues discussed today include:        Signs and symptoms:  UTI sx  Duration:  Few days  Context:  recently on abx for URI  Location:  Vaginal itchig  Quality:  Sound like monilia  Severity:  No fevers  Timing:  UA  = 3+ LE  Modifying factors:  Rx omnicef and diflucan    Data reviewed or ordered today:  Urine culture    Other problems include:  Patient Active Problem List   Diagnosis Code    Pseudogout M11.20    Depression F32.9    GERD (gastroesophageal reflux disease) K21.9    ALESIA (obstructive sleep apnea) G47.33    Chest pain R07.9    History of normal resting EKG [id number]    Colon polyp K63.5    S/P JESSICA-BSO Z90.710, Z90.722, Z90.79    Essential hypertension with goal blood pressure less than 140/90 I10    Hyperglycemia R73.9    Knee pain, right M25.561    Osteoarthritis M19.90    Lipid disorder E78.9    Osteoporosis M81.0    Advance directive discussed with patient Z70.80    Health care maintenance Z00.00    Urinary incontinence R32    Anxiety F41.9    Hypercalcemia E83.52    Allergic to tetanus vaccine Z88.7       Medications:  Current Outpatient Prescriptions   Medication Sig Dispense Refill    cyclobenzaprine (FLEXERIL) 10 mg tablet Take  by mouth three (3) times daily as needed for Muscle Spasm(s).  fluconazole (DIFLUCAN) 150 mg tablet Take 1 Tab by mouth daily for 1 day. FDA advises cautious prescribing of oral fluconazole in pregnancy. 1 Tab 1    cefdinir (OMNICEF) 300 mg capsule Take 1 Cap by mouth daily for 7 days. 7 Cap 0    losartan (COZAAR) 100 mg tablet TAKE 1 TABLET BY MOUTH DAILY. 90 Tab 3    amLODIPine (NORVASC) 2.5 mg tablet Take 1 Tab by mouth daily. Indications: Hypertension 90 Tab 3    pantoprazole (PROTONIX) 40 mg tablet Take 1 Tab by mouth daily.  90 Tab 3    FLUoxetine (PROZAC) 10 mg capsule TAKE 1 CAPSULE BY MOUTH EVERY DAY 90 Cap 3    fluticasone (FLONASE) 50 mcg/actuation nasal spray INHALE 2 PUFFS INTO EACH NOSTRIL DAILY 1 Bottle 11    cholecalciferol (VITAMIN D3) 1,000 unit cap Take 2,000 Units by mouth daily.  aspirin 81 mg chewable tablet Take 1 Tab by mouth daily. 100 Tab 11    MULTIVITAMINS (MULTIPLE VITAMIN PO) Take  by mouth.  methylPREDNISolone (MEDROL DOSEPACK) 4 mg tablet Take with food 1 Dose Pack 0    ALPRAZolam (XANAX) 0.5 mg tablet TAKE 1 TABLET BY MOUTH 3 TIMES A DAY as needed 60 Tab 1    INCRUSE ELLIPTA 62.5 mcg/actuation inhaler Take 1 Puff by mouth daily. 6    acetaminophen (TYLENOL ARTHRITIS PAIN) 650 mg CR tablet Take 650 mg by mouth four (4) times daily. Allergies: Allergies   Allergen Reactions    Bactrim [Sulfamethoprim] Swelling    Lipitor [Atorvastatin] Other (comments)     Aches      Roxicodone [Oxycodone] Swelling    Tetanus-Diphtheria Toxoids-Td Swelling     Allergic to Td?  Welchol [Colesevelam] Other (comments)     Aches         LMP:  Patient's last menstrual period was 05/19/1989. Social History     Social History    Marital status: SINGLE     Spouse name: N/A    Number of children: N/A    Years of education: N/A     Occupational History    Not on file. Social History Main Topics    Smoking status: Former Smoker     Packs/day: 0.50     Quit date: 9/7/2010    Smokeless tobacco: Never Used    Alcohol use No      Comment: RARE    Drug use: No    Sexual activity: No     Other Topics Concern    Not on file     Social History Narrative         Family History   Problem Relation Age of Onset    Diabetes Father     Arthritis-rheumatoid Father     Heart Disease Father     Cancer Maternal Aunt     Breast Cancer Maternal Aunt     Cancer Other     Breast Cancer Sister     Breast Cancer Maternal Aunt          Meaningful use:  done      ROS:  Headaches:  no  Chest Pain:  no  SOB:  no  Fevers:  no  Falls:  no  anxiety/depression/losing interest in doing things that were previously enjoyed:  no.   PHQ2 = 0  Other significant ROS:      Patient's last menstrual period was 05/19/1989. Physical Exam  Visit Vitals    /82 (BP 1 Location: Right arm, BP Patient Position: Sitting)    Pulse 80    Temp 98 °F (36.7 °C) (Oral)    Resp 17    Ht 5' 2\" (1.575 m)    Wt 232 lb 9.6 oz (105.5 kg)    LMP 05/19/1989    SpO2 93%    BMI 42.54 kg/m2     BP Readings from Last 3 Encounters:   05/30/17 130/82   05/16/17 149/80   04/18/17 129/80     Constitutional:  Appears well,  No Acute Distress, Vitals noted  Psychiatric:   Affect normal, Alert and cooperative, Oriented to person/place/time    Abd:  Benign  No CVAT  Extremities:   without edema, good peripheral pulses            Assessment:    Patient Active Problem List   Diagnosis Code    Pseudogout M11.20    Depression F32.9    GERD (gastroesophageal reflux disease) K21.9    ALESIA (obstructive sleep apnea) G47.33    Chest pain R07.9    History of normal resting EKG [id number]    Colon polyp K63.5    S/P JESSICA-BSO Z90.710, Z90.722, Z90.79    Essential hypertension with goal blood pressure less than 140/90 I10    Hyperglycemia R73.9    Knee pain, right M25.561    Osteoarthritis M19.90    Lipid disorder E78.9    Osteoporosis M81.0    Advance directive discussed with patient Z70.80    Health care maintenance Z00.00    Urinary incontinence R32    Anxiety F41.9    Hypercalcemia E83.52    Allergic to tetanus vaccine Z88.7       Today's diagnoses are:    ICD-10-CM ICD-9-CM    1. Itching in the vaginal area L29.8 698.1 AMB POC URINALYSIS DIP STICK AUTO W/ MICRO   2. Monilia infection B37.9 112.9 fluconazole (DIFLUCAN) 150 mg tablet   3. Urinary tract infection without hematuria, site unspecified N39.0 599.0 cefdinir (OMNICEF) 300 mg capsule   4. Screening for depression Z13.89 V79.0 SD DEPRESSION SCREEN ANNUAL   5.  Skin lesion of face L98.9 709.9 REFERRAL TO DERMATOLOGY       Plan:  Orders Placed This Encounter    REFERRAL TO DERMATOLOGY     Referral Priority:   Routine     Referral Type:   Consultation Referral Reason:   Specialty Services Required     Referred to Provider:   Johanna Gonzalez MD     Requested Specialty:   Dermatology    AMB POC URINALYSIS DIP STICK AUTO W/ MICRO    NV DEPRESSION SCREEN ANNUAL    cyclobenzaprine (FLEXERIL) 10 mg tablet     Sig: Take  by mouth three (3) times daily as needed for Muscle Spasm(s).  fluconazole (DIFLUCAN) 150 mg tablet     Sig: Take 1 Tab by mouth daily for 1 day. FDA advises cautious prescribing of oral fluconazole in pregnancy. Dispense:  1 Tab     Refill:  1    cefdinir (OMNICEF) 300 mg capsule     Sig: Take 1 Cap by mouth daily for 7 days.      Dispense:  7 Cap     Refill:  0       See patient instructions  Patient Instructions   Take cefdinir one pill daily for 7 days    ALSO take diflucan one pill now and one pill in one week        refresh note:  done    AVS Printed:  done

## 2017-05-31 ENCOUNTER — TELEPHONE (OUTPATIENT)
Dept: FAMILY MEDICINE CLINIC | Age: 77
End: 2017-05-31

## 2017-05-31 NOTE — TELEPHONE ENCOUNTER
Patient called to request a referral be sent over to   Dermatology   [address] [facility]   Dr. Toma Clark  6/5/17 11:25 am   Phone [phone]  Fax unknown

## 2017-06-02 NOTE — TELEPHONE ENCOUNTER
Patient called to say Dr. Edith Liu contacted her and need the referral now. She said they told her if it was not rec'd before 5:00 today that she have to pay $50 to that office.     Call her

## 2017-08-04 ENCOUNTER — TELEPHONE (OUTPATIENT)
Dept: FAMILY MEDICINE CLINIC | Age: 77
End: 2017-08-04

## 2017-08-04 DIAGNOSIS — R06.2 WHEEZING: Primary | ICD-10-CM

## 2017-08-04 NOTE — TELEPHONE ENCOUNTER
Per call from [address] [place] [address],    appt is 8/11/17 at 9:30 am    Dr. Lorna Bloom    Fax [phone]   [phone]      thanks

## 2017-08-31 DIAGNOSIS — F41.9 ANXIETY: ICD-10-CM

## 2017-08-31 RX ORDER — ALPRAZOLAM 0.5 MG/1
TABLET ORAL
Qty: 60 TAB | Refills: 0 | Status: SHIPPED | OUTPATIENT
Start: 2017-08-31 | End: 2017-12-19 | Stop reason: SDUPTHER

## 2017-12-13 DIAGNOSIS — I10 ESSENTIAL HYPERTENSION: ICD-10-CM

## 2017-12-13 DIAGNOSIS — F41.9 ANXIETY: ICD-10-CM

## 2017-12-13 DIAGNOSIS — K21.9 GASTROESOPHAGEAL REFLUX DISEASE WITHOUT ESOPHAGITIS: ICD-10-CM

## 2017-12-13 RX ORDER — FLUOXETINE 10 MG/1
CAPSULE ORAL
Qty: 90 CAP | Refills: 2 | Status: SHIPPED | OUTPATIENT
Start: 2017-12-13 | End: 2018-05-08 | Stop reason: ALTCHOICE

## 2017-12-13 RX ORDER — PANTOPRAZOLE SODIUM 40 MG/1
TABLET, DELAYED RELEASE ORAL
Qty: 90 TAB | Refills: 3 | Status: SHIPPED | OUTPATIENT
Start: 2017-12-13 | End: 2018-12-13 | Stop reason: ALTCHOICE

## 2017-12-13 RX ORDER — AMLODIPINE BESYLATE 2.5 MG/1
TABLET ORAL
Qty: 90 TAB | Refills: 3 | Status: SHIPPED | OUTPATIENT
Start: 2017-12-13 | End: 2018-12-13 | Stop reason: SDUPTHER

## 2017-12-19 ENCOUNTER — OFFICE VISIT (OUTPATIENT)
Dept: FAMILY MEDICINE CLINIC | Age: 77
End: 2017-12-19

## 2017-12-19 VITALS
BODY MASS INDEX: 43.06 KG/M2 | HEIGHT: 62 IN | HEART RATE: 87 BPM | WEIGHT: 234 LBS | OXYGEN SATURATION: 96 % | RESPIRATION RATE: 22 BRPM | SYSTOLIC BLOOD PRESSURE: 153 MMHG | DIASTOLIC BLOOD PRESSURE: 85 MMHG | TEMPERATURE: 97.6 F

## 2017-12-19 DIAGNOSIS — Z00.00 HEALTH CARE MAINTENANCE: Primary | ICD-10-CM

## 2017-12-19 DIAGNOSIS — Z13.31 SCREENING FOR DEPRESSION: ICD-10-CM

## 2017-12-19 DIAGNOSIS — F34.1 DYSTHYMIA: ICD-10-CM

## 2017-12-19 DIAGNOSIS — M25.561 CHRONIC PAIN OF RIGHT KNEE: ICD-10-CM

## 2017-12-19 DIAGNOSIS — G89.29 CHRONIC PAIN OF RIGHT KNEE: ICD-10-CM

## 2017-12-19 DIAGNOSIS — M19.90 OSTEOARTHRITIS, UNSPECIFIED OSTEOARTHRITIS TYPE, UNSPECIFIED SITE: ICD-10-CM

## 2017-12-19 DIAGNOSIS — F41.9 ANXIETY: ICD-10-CM

## 2017-12-19 DIAGNOSIS — Z12.31 ENCOUNTER FOR SCREENING MAMMOGRAM FOR BREAST CANCER: ICD-10-CM

## 2017-12-19 DIAGNOSIS — Z80.3 FH: BREAST CANCER IN FIRST DEGREE RELATIVE: ICD-10-CM

## 2017-12-19 DIAGNOSIS — J40 BRONCHITIS: ICD-10-CM

## 2017-12-19 DIAGNOSIS — Z13.220 LIPID SCREENING: ICD-10-CM

## 2017-12-19 DIAGNOSIS — I10 ESSENTIAL HYPERTENSION: ICD-10-CM

## 2017-12-19 DIAGNOSIS — E66.01 OBESITY, MORBID (HCC): ICD-10-CM

## 2017-12-19 DIAGNOSIS — M81.0 OSTEOPOROSIS, UNSPECIFIED OSTEOPOROSIS TYPE, UNSPECIFIED PATHOLOGICAL FRACTURE PRESENCE: ICD-10-CM

## 2017-12-19 DIAGNOSIS — Z71.89 ADVANCE DIRECTIVE DISCUSSED WITH PATIENT: ICD-10-CM

## 2017-12-19 DIAGNOSIS — Z23 ENCOUNTER FOR IMMUNIZATION: ICD-10-CM

## 2017-12-19 LAB
ALBUMIN UR QL STRIP: 80 MG/L
CREATININE, URINE POC: 200 MG/DL
MICROALBUMIN/CREAT RATIO POC: <30 MG/G

## 2017-12-19 RX ORDER — PREDNISONE 20 MG/1
20 TABLET ORAL DAILY
Qty: 5 TAB | Refills: 0 | Status: SHIPPED | OUTPATIENT
Start: 2017-12-19 | End: 2017-12-24

## 2017-12-19 RX ORDER — DOXYCYCLINE 100 MG/1
100 TABLET ORAL DAILY
Qty: 10 TAB | Refills: 0 | Status: SHIPPED | OUTPATIENT
Start: 2017-12-19 | End: 2017-12-29

## 2017-12-19 RX ORDER — ALPRAZOLAM 0.5 MG/1
TABLET ORAL
Qty: 60 TAB | Refills: 0 | Status: SHIPPED | OUTPATIENT
Start: 2017-12-19

## 2017-12-19 NOTE — ASSESSMENT & PLAN NOTE
Uncontrolled, based on history, physical exam and review of pertinent labs, studies and medications; meds reconciled; continue current treatment plan, lifestyle modifications recommended. Key Obesity Meds     Patient is on no anti-obesity meds.         Lab Results   Component Value Date/Time    Glucose 102 12/06/2016 12:40 PM    LDL,Direct 160 12/06/2016 12:40 PM    Sodium 145 12/06/2016 12:40 PM    Potassium 4.1 12/06/2016 12:40 PM    ALT (SGPT) 13 12/06/2016 12:40 PM

## 2017-12-19 NOTE — PATIENT INSTRUCTIONS
Take Doxycycline with food and a big glass of water.   Avoid the sun while on Doxycycline (either cover up or wear SPF 15 or above sun block)      Stay active    obtain mammogram    recheck BP in January 2018

## 2017-12-19 NOTE — PROGRESS NOTES
Uche Cummins is a 78 y.o. female      Issues discussed today include:      Signs and symptoms:  Cough producing yellow sputum  Duration:2week  Context:  +exposures, she went to UC and was given Doxy and steroids which helped  Location:  Head and chest  Quality:  congestion  Severity:  Preventing rest  Timing:  now  Modifying factors:  She feels like it is still hanging on      Data reviewed or ordered today:  labs    Health Maintenance 2017      Female exams: JESSICA/one ovary intact, done for bleeding,   Mammogram:   Vitamin D level:   DEXA:  osteoporosis  Last PAP: No longer getting      Colonoscopy:  Dr. Radha Lovelace   FOBT:   Glaucoma screen: Dr. Radha Bustamante  Tdap: Cannot get  Pneumovax:   PCV13 done   Flu shot:   Zostavax: Needs   EKG documentation: , repeat   Advanced Directives: discussed full code  Face to face meeting to document need for DME, home health, therapy: done: none  Updated specialist: done, see problem list  FTF for BNZ:   as expected 2016    Other problems include:  Patient Active Problem List   Diagnosis Code    Pseudogout M11.20    Depression F32.9    GERD (gastroesophageal reflux disease) K21.9    ALESIA (obstructive sleep apnea) G47.33    Chest pain R07.9    History of normal resting EKG [id number]    Colon polyp K63.5    S/P JESSICA-BSO Z90.710, Z90.722, Z90.79    Essential hypertension with goal blood pressure less than 140/90 I10    Hyperglycemia R73.9    Knee pain, right M25.561    Osteoarthritis M19.90    Lipid disorder E78.9    Osteoporosis M81.0    Advance directive discussed with patient Z70.80    Health care maintenance Z00.00    Urinary incontinence R32    Anxiety F41.9    Hypercalcemia E83.52    Allergic to tetanus vaccine Z88.7    Obesity, morbid (HCC) E66.01       Medications:  Current Outpatient Prescriptions   Medication Sig Dispense Refill    FLUoxetine (PROZAC) 10 mg capsule TAKE 1 CAPSULE BY MOUTH EVERY DAY 90 Cap 2  pantoprazole (PROTONIX) 40 mg tablet TAKE 1 TAB BY MOUTH DAILY. 90 Tab 3    amLODIPine (NORVASC) 2.5 mg tablet TAKE 1 TAB BY MOUTH DAILY. INDICATIONS: HYPERTENSION 90 Tab 3    ALPRAZolam (XANAX) 0.5 mg tablet TAKE 1 TABLET BY MOUTH 3 TIMES A DAY AS NEEDED 60 Tab 0    cyclobenzaprine (FLEXERIL) 10 mg tablet Take  by mouth three (3) times daily as needed for Muscle Spasm(s).  losartan (COZAAR) 100 mg tablet TAKE 1 TABLET BY MOUTH DAILY. 90 Tab 3    fluticasone (FLONASE) 50 mcg/actuation nasal spray INHALE 2 PUFFS INTO EACH NOSTRIL DAILY 1 Bottle 11    INCRUSE ELLIPTA 62.5 mcg/actuation inhaler Take 1 Puff by mouth daily. 6    cholecalciferol (VITAMIN D3) 1,000 unit cap Take 2,000 Units by mouth daily.  acetaminophen (TYLENOL ARTHRITIS PAIN) 650 mg CR tablet Take 650 mg by mouth four (4) times daily.  aspirin 81 mg chewable tablet Take 1 Tab by mouth daily. 100 Tab 11    MULTIVITAMINS (MULTIPLE VITAMIN PO) Take  by mouth.  methylPREDNISolone (MEDROL DOSEPACK) 4 mg tablet Take with food 1 Dose Pack 0       Allergies: Allergies   Allergen Reactions    Bactrim [Sulfamethoprim] Swelling    Lipitor [Atorvastatin] Other (comments)     Aches      Roxicodone [Oxycodone] Swelling    Tetanus-Diphtheria Toxoids-Td Swelling     Allergic to Td?  Welchol [Colesevelam] Other (comments)     Aches         LMP:  Patient's last menstrual period was 05/19/1989. Social History     Social History    Marital status: SINGLE     Spouse name: N/A    Number of children: N/A    Years of education: N/A     Occupational History    Not on file.      Social History Main Topics    Smoking status: Former Smoker     Packs/day: 0.50     Quit date: 9/7/2010    Smokeless tobacco: Never Used    Alcohol use No      Comment: RARE    Drug use: No    Sexual activity: No     Other Topics Concern    Not on file     Social History Narrative         Family History   Problem Relation Age of Onset    Diabetes Father     Arthritis-rheumatoid Father     Heart Disease Father     Cancer Maternal Aunt     Breast Cancer Maternal Aunt     Cancer Other     Breast Cancer Sister     Breast Cancer Maternal Aunt        Meaningful use:  done      ROS:  Headaches:  no  Chest Pain:  no  SOB:  no  Fevers:  no  Falls:  no    Other significant ROS:    Patient denied problems with:    Hearing/vision, speaking/swallowing, Reflux/indigestion, Cough,Diarrhea/constipation,Problems passing or controlling urine,  Mood (anxiety/depression/losing interest in doing things that were previously enjoyed), Snoring/sleep apnea (on CPAP),Fatigue, Weight change, memory                                                         Any other Positive ROS include: knee pain        Falls in the past 12 months:  no           Over the last year (or since your last visit):  Have you been diagnosed with heart attack, stroke, broken bones, or skin cancer = no    Exercise:  Needs more, she declined PT             Smoking history:  none                                    Patient's last menstrual period was 05/19/1989. Physical Exam  Visit Vitals    /85 (BP 1 Location: Right arm, BP Patient Position: Sitting)    Pulse 87    Temp 97.6 °F (36.4 °C) (Oral)    Resp 22    Ht 5' 2\" (1.575 m)    Wt 234 lb (106.1 kg)    LMP 05/19/1989    SpO2 96%    BMI 42.8 kg/m2     BP Readings from Last 3 Encounters:   12/19/17 153/85   05/30/17 130/82   05/16/17 149/80     Constitutional: Appears well,  No Acute Distress, Vitals noted  Psychiatric:   Affect normal, Alert and cooperative, Oriented to person/place/time    Eyes:   Pupils equally round and reactive, EOMI, conjunctiva clear, eyelids normal  ENT:   External ears and nose normal/lips, teeth=OK/gums normal, TMs and Orophyarynx normal  Neck:   general inspection and Thyroid normal.  No abnormal cervical or supraclavicular nodes    Lungs:   clear to auscultation, good respiratory effort  Heart:    Ausculation normal.  Regular rhythm. No cardiac murmurs. No carotid bruits or palpable thrills  Chest wall normal  Abd:  benign  Extremities:   without edema, good peripheral pulses  Skin:   Warm to palpation, without rashes, bruising, or suspicious lesions   MSK:  Right knee OA        Assessment:    Patient Active Problem List   Diagnosis Code    Pseudogout M11.20    Depression F32.9    GERD (gastroesophageal reflux disease) K21.9    ALESIA (obstructive sleep apnea) G47.33    Chest pain R07.9    History of normal resting EKG [id number]    Colon polyp K63.5    S/P JESSICA-BSO Z90.710, Z90.722, Z90.79    Essential hypertension with goal blood pressure less than 140/90 I10    Hyperglycemia R73.9    Knee pain, right M25.561    Osteoarthritis M19.90    Lipid disorder E78.9    Osteoporosis M81.0    Advance directive discussed with patient Z70.80    Health care maintenance Z00.00    Urinary incontinence R32    Anxiety F41.9    Hypercalcemia E83.52    Allergic to tetanus vaccine Z88.7    Obesity, morbid ([facility]) E66.01       Today's diagnoses are:    ICD-10-CM ICD-9-CM    1. Health care maintenance Z00.00 V70.0    2. Advance directive discussed with patient Z71.89 V65.49    3. Anxiety F41.9 300.00    4. Dysthymia F34.1 300.4    5. Chronic pain of right knee M25.561 719.46     G89.29 338.29    6. Obesity, morbid ([facility] [address] E66.01 278.01        Plan:  No orders of the defined types were placed in this encounter. See patient instructions  There are no Patient Instructions on file for this visit. refresh note:  done    AVS Printed:  done      Diagnoses and all orders for this visit:    1. Health care maintenance    2. Advance directive discussed with patient    3. Anxiety    4. Dysthymia    5. Chronic pain of right knee    6.  Obesity, morbid ([address]  Assessment & Plan:  Uncontrolled, based on history, physical exam and review of pertinent labs, studies and medications; meds reconciled; continue current treatment plan, lifestyle modifications recommended. Key Obesity Meds     Patient is on no anti-obesity meds.         Lab Results   Component Value Date/Time    Glucose 102 12/06/2016 12:40 PM    LDL,Direct 160 12/06/2016 12:40 PM    Sodium 145 12/06/2016 12:40 PM    Potassium 4.1 12/06/2016 12:40 PM    ALT (SGPT) 13 12/06/2016 12:40 PM

## 2017-12-19 NOTE — LETTER
12/19/2017 9:39 AM 
 
Ms. Mercedes Marin Guardian [id number] Body mass index is 42.8 kg/(m^2). Focus on regular exercise (150 minutes each week) and healthy eating. Eat more fruits and vegetables. Eat more protein (egg whites, beans, and nuts you know you tolerate) and less carbohydrates (white bread, white rice, white pasta, white potatoes, sodas, and sweets). Eat appropriately small portion sizes. Sincerely, Brian Carpenter MD

## 2017-12-19 NOTE — PROGRESS NOTES
Chief Complaint   Patient presents with    Annual Wellness Visit    Immunization/Injection     Pneumococcal Vaccine per verbal order from MD     1. Have you been to the ER, urgent care clinic since your last visit? Hospitalized since your last visit? No    2. Have you seen or consulted any other health care providers outside of the [address] [place] [address] since your last visit? Include any pap smears or colon screening. No     Reviewed record in preparation for visit and have obtained necessary documentation.

## 2017-12-19 NOTE — LETTER
12/20/2017 2:40 PM 
 
Ms. Lupe Rivas [address] [id number] Dear Lupe Rivas: 
 
Please find your most recent results below. Resulted Orders LDL, DIRECT Result Value Ref Range LDL,Direct 142 (H) 0 - 99 mg/dL Narrative Performed at:  [address], [address] [place] [address]  [id number] : Blanche Mota MD, Phone:  [phone] VITAMIN D, 25 HYDROXY Result Value Ref Range VITAMIN D, 25-HYDROXY 36.4 30.0 - 100.0 ng/mL Comment:  
   Vitamin D deficiency has been defined by the [address] [place] [address] practice guideline as a 
level of serum 25-OH vitamin D less than 20 ng/mL (1,2). The Endocrine Society went on to further define vitamin D 
insufficiency as a level between 21 and 29 ng/mL (2). 1. IOM ([place] of Medicine). 2010. Dietary reference 
   intakes for calcium and D. [address] [place] [address]: The 
   [employer]. 2. Morteza MF, Nella NC, Ashlee SILVA, et al. 
   Evaluation, treatment, and prevention of vitamin D 
   deficiency: an Endocrine Society clinical practice 
   guideline. JCEM. 2011 Jul; 96(7):1911-30. Narrative Performed at:  [address], [address] [place] [address]  [id number] : Blanche Mota MD, Phone:  [phone] ALT Result Value Ref Range ALT (SGPT) 29 0 - 32 IU/L Narrative Performed at:  [address]  [id number] : Blanche Mota MD, Phone:  [phone] HGB & HCT Result Value Ref Range HGB 13.8 11.1 - 15.9 g/dL HCT 42.2 34.0 - 46.6 % Narrative Performed at:  [address], [address] [place] [address]  [id number] : Blanche Mota MD, Phone:  [phone] METABOLIC PANEL, BASIC Result Value Ref Range Glucose 91 65 - 99 mg/dL BUN 26 8 - 27 mg/dL Creatinine 0.92 0.57 - 1.00 mg/dL GFR est non-AA 61 >59 mL/min/1.73 GFR est AA 70 >59 mL/min/1.73  
 BUN/Creatinine ratio 28 12 - 28 Sodium 143 134 - 144 mmol/L Potassium 4.9 3.5 - 5.2 mmol/L Chloride 102 96 - 106 mmol/L  
 CO2 23 18 - 29 mmol/L Calcium 9.3 8.7 - 10.3 mg/dL Narrative Performed at:  [address]  [id number] : Bill Marinelli MD, Phone:  [phone] AMB POC URINE, MICROALBUMIN, SEMIQUANT (3 RESULTS) Result Value Ref Range ALBUMIN, URINE POC 80 Negative mg/L  
 CREATININE, URINE  mg/dL Microalbumin/creat ratio (POC) <30 MG/G Comment:  
   Normal  
 
 
RECOMMENDATIONS: 
Your labs are stable. Focus on regular exercise (150 minutes each week) and healthy eating. Eat more fruits and vegetables. Eat more protein (egg whites, beans, and nuts you know you tolerate) and less carbohydrates (white bread, white rice, white pasta, white potatoes, sodas, and sweets).  Eat appropriately small portion sizes. Sincerely, Lilia Jin MD

## 2017-12-19 NOTE — MR AVS SNAPSHOT
Visit Information Date & Time Provider Department Dept. Phone Encounter #  
 12/19/2017  9:10 AM Diamond Madsen MD Didi Robert [phone] [id number] Upcoming Health Maintenance Date Due Influenza Age 5 to Adult 8/1/2017 MEDICARE YEARLY EXAM 12/7/2017 GLAUCOMA SCREENING Q2Y 8/9/2018 COLONOSCOPY 8/9/2021 DTaP/Tdap/Td series (2 - Td) 8/9/2026 Allergies as of 12/19/2017  Review Complete On: 12/19/2017 By: Michael Kraus LPN Severity Noted Reaction Type Reactions Bactrim [Sulfamethoprim]  03/22/2012    Swelling Lipitor [Atorvastatin]  07/15/2010    Other (comments) Aches Roxicodone [Oxycodone]  03/22/2012    Swelling Tetanus-diphtheria Toxoids-td  07/15/2010    Swelling Allergic to Td? Welchol [Colesevelam]  07/15/2010    Other (comments) Aches Current Immunizations  Reviewed on 12/19/2017 Name Date Influenza Vaccine 10/1/2017, 9/22/2016, 10/12/2015, 10/18/2014 Influenza Vaccine Split 10/9/2012 Pneumococcal Conjugate (PCV-13) 9/22/2016 Pneumococcal Vaccine (Pcv) 10/9/2008, 2/2/1998 Reviewed by Diamond Madsen MD on 12/19/2017 at  9:38 AM  
You Were Diagnosed With   
  
 Codes Comments Health care maintenance    -  Primary ICD-10-CM: Z00.00 ICD-9-CM: V70.0 Advance directive discussed with patient     ICD-10-CM: Z71.89 ICD-9-CM: V65.49 Anxiety     ICD-10-CM: F41.9 ICD-9-CM: 300.00 FTF 12/19/2017,  as expected Dysthymia     ICD-10-CM: F34.1 ICD-9-CM: 300.4 Chronic pain of right knee     ICD-10-CM: M25.561, G89.29 ICD-9-CM: 719.46, 338.29 Obesity, morbid ([facility] [address]     ICD-10-CM: E66.01 
ICD-9-CM: 278.01 Screening for depression     ICD-10-CM: Z13.89 ICD-9-CM: V79.0 Bronchitis     ICD-10-CM: J40 ICD-9-CM: 305 Osteoporosis, unspecified osteoporosis type, unspecified pathological fracture presence     ICD-10-CM: M81.0 ICD-9-CM: 733.00   
 Lipid screening     ICD-10-CM: Y46.187 ICD-9-CM: V77.91 Essential hypertension     ICD-10-CM: I10 
ICD-9-CM: 401.9 Encounter for screening mammogram for breast cancer     ICD-10-CM: Z12.31 
ICD-9-CM: V76.12 Vitals BP Pulse Temp Resp Height(growth percentile) Weight(growth percentile) 153/85 (BP 1 Location: Right arm, BP Patient Position: Sitting) 87 97.6 °F (36.4 °C) (Oral) 22 5' 2\" (1.575 m) 234 lb (106.1 kg) LMP SpO2 BMI OB Status Smoking Status 05/19/1989 96% 42.8 kg/m2 Postmenopausal Former Smoker Vitals History BMI and BSA Data Body Mass Index Body Surface Area  
 42.8 kg/m 2 2.15 m 2 Preferred Pharmacy Pharm[facility] Phone [facility] [address], [address] [place] [address] [phone] Your Updated Medication List  
  
   
This list is accurate as of: 12/19/17  9:49 AM.  Always use your most recent med list.  
  
  
  
  
 ALPRAZolam 0.5 mg tablet Commonly known as:  XANAX  
TAKE 1 TABLET BY MOUTH 3 TIMES A DAY AS NEEDED  
  
 amLODIPine 2.5 mg tablet Commonly known as:  Celestino Alstrom TAKE 1 TAB BY MOUTH DAILY. INDICATIONS: HYPERTENSION  
  
 aspirin 81 mg chewable tablet Take 1 Tab by mouth daily. cholecalciferol 1,000 unit Cap Commonly known as:  VITAMIN D3 Take 2,000 Units by mouth daily. doxycycline 100 mg tablet Commonly known as:  ADOXA Take 1 Tab by mouth daily for 10 days. FLUoxetine 10 mg capsule Commonly known as:  PROzac TAKE 1 CAPSULE BY MOUTH EVERY DAY  
  
 fluticasone 50 mcg/actuation nasal spray Commonly known as:  Lindalee [place] INHALE 2 PUFFS INTO EACH NOSTRIL DAILY losartan 100 mg tablet Commonly known as:  COZAAR  
TAKE 1 TABLET BY MOUTH DAILY. MULTIPLE VITAMIN PO Take  by mouth.  
  
 pantoprazole 40 mg tablet Commonly known as:  PROTONIX  
TAKE 1 TAB BY MOUTH DAILY. predniSONE 20 mg tablet Commonly known as:  Berle Pouch  
 Take 1 Tab by mouth daily for 5 doses. TYLENOL ARTHRITIS PAIN 650 mg Dustin Pipe Generic drug:  acetaminophen Take 650 mg by mouth four (4) times daily. Prescriptions Printed Refills  
 doxycycline (ADOXA) 100 mg tablet 0 Sig: Take 1 Tab by mouth daily for 10 days. Class: Print Route: Oral  
 predniSONE (DELTASONE) 20 mg tablet 0 Sig: Take 1 Tab by mouth daily for 5 doses. Class: Print Route: Oral  
 ALPRAZolam (XANAX) 0.5 mg tablet 0 Sig: TAKE 1 TABLET BY MOUTH 3 TIMES A DAY AS NEEDED Class: Print We Performed the Following ALT [id number] CPT(R)] AMB POC URINE, MICROALBUMIN, SEMIQUANT (3 RESULTS) [31654 CPT(R)] HGB & HCT [12354 CPT(R)] LDL, DIRECT [id number] CPT(R)] METABOLIC PANEL, BASIC [68993 CPT(R)] [address] [place] [address] [place] [ [facility]] VITAMIN D, 25 HYDROXY [id number] CPT(R)] Patient Instructions Take Doxycycline with food and a big glass of water. Avoid the sun while on Doxycycline (either cover up or wear SPF 15 or above sun block) Stay active 
 
obtain mammogram 
 
recheck BP in January 2018 Introducing [facility]! Juancho Norris introduces Bapul patient portal. Now you can access parts of your medical record, email your doctor's office, and request medication refills online. 1. In your internet browser, go to [email or web address] 2. Click on the First Time User? Click Here link in the Sign In box. You will see the New Member Sign Up page. 3. Enter your Bapul Access Code exactly as it appears below. You will not need to use this code after youve completed the sign-up process. If you do not sign up before the expiration date, you must request a new code. · Bapul Access Code: [facility] Expires: 3/19/2018  9:49 AM 
 
4. Enter the last four digits of your Social Security Number (xxxx) and Date of Birth (mm/dd/yyyy) as indicated and click Submit.  You will be taken to the next sign-up page. 5. Create a Embrane ID. This will be your Embrane login ID and cannot be changed, so think of one that is secure and easy to remember. 6. Create a Embrane password. You can change your password at any time. 7. Enter your Password Reset Question and Answer. This can be used at a later time if you forget your password. 8. Enter your e-mail address. You will receive e-mail notification when new information is available in [address] 9. Click Sign Up. You can now view and download portions of your medical record. 10. Click the Download Summary menu link to download a portable copy of your medical information. If you have questions, please visit the Frequently Asked Questions section of the Embrane website. Remember, Embrane is NOT to be used for urgent needs. For medical emergencies, dial 911. Now available from your iPhone and Android! Please provide this summary of care documentation to your next provider. Your primary care clinician is listed as Shwetha Castellano. If you have any questions after today's visit, please call [phone].

## 2017-12-20 LAB
25(OH)D3+25(OH)D2 SERPL-MCNC: 36.4 NG/ML (ref 30–100)
ALT SERPL-CCNC: 29 IU/L (ref 0–32)
BUN SERPL-MCNC: 26 MG/DL (ref 8–27)
BUN/CREAT SERPL: 28 (ref 12–28)
CALCIUM SERPL-MCNC: 9.3 MG/DL (ref 8.7–10.3)
CHLORIDE SERPL-SCNC: 102 MMOL/L (ref 96–106)
CO2 SERPL-SCNC: 23 MMOL/L (ref 18–29)
CREAT SERPL-MCNC: 0.92 MG/DL (ref 0.57–1)
GLUCOSE SERPL-MCNC: 91 MG/DL (ref 65–99)
HCT VFR BLD AUTO: 42.2 % (ref 34–46.6)
HGB BLD-MCNC: 13.8 G/DL (ref 11.1–15.9)
LDLC SERPL DIRECT ASSAY-MCNC: 142 MG/DL (ref 0–99)
POTASSIUM SERPL-SCNC: 4.9 MMOL/L (ref 3.5–5.2)
SODIUM SERPL-SCNC: 143 MMOL/L (ref 134–144)

## 2017-12-20 NOTE — PROGRESS NOTES
Your labs are stable. Focus on regular exercise (150 minutes each week) and healthy eating. Eat more fruits and vegetables. Eat more protein (egg whites, beans, and nuts you know you tolerate) and less carbohydrates (white bread, white rice, white pasta, white potatoes, sodas, and sweets). Eat appropriately small portion sizes.

## 2017-12-22 DIAGNOSIS — K59.00 CONSTIPATION, UNSPECIFIED CONSTIPATION TYPE: Primary | ICD-10-CM

## 2018-02-07 ENCOUNTER — OFFICE VISIT (OUTPATIENT)
Dept: FAMILY MEDICINE CLINIC | Age: 78
End: 2018-02-07

## 2018-02-07 VITALS
RESPIRATION RATE: 16 BRPM | WEIGHT: 236.2 LBS | OXYGEN SATURATION: 93 % | DIASTOLIC BLOOD PRESSURE: 86 MMHG | HEART RATE: 76 BPM | HEIGHT: 62 IN | TEMPERATURE: 97.6 F | BODY MASS INDEX: 43.47 KG/M2 | SYSTOLIC BLOOD PRESSURE: 140 MMHG

## 2018-02-07 DIAGNOSIS — F34.1 DYSTHYMIA: ICD-10-CM

## 2018-02-07 DIAGNOSIS — Z13.31 SCREENING FOR DEPRESSION: ICD-10-CM

## 2018-02-07 DIAGNOSIS — M17.11 PRIMARY OSTEOARTHRITIS OF RIGHT KNEE: Primary | ICD-10-CM

## 2018-02-07 DIAGNOSIS — M25.561 ACUTE PAIN OF RIGHT KNEE: ICD-10-CM

## 2018-02-07 DIAGNOSIS — F41.9 ANXIETY: ICD-10-CM

## 2018-02-07 DIAGNOSIS — Z91.81 RISK FOR FALLS: ICD-10-CM

## 2018-02-07 RX ORDER — NAPROXEN SODIUM 220 MG
220 TABLET ORAL
COMMUNITY
End: 2018-02-07 | Stop reason: ALTCHOICE

## 2018-02-07 RX ORDER — TRAMADOL HYDROCHLORIDE 50 MG/1
50 TABLET ORAL
Qty: 45 TAB | Refills: 1 | Status: SHIPPED | OUTPATIENT
Start: 2018-02-07 | End: 2018-06-23 | Stop reason: SDUPTHER

## 2018-02-07 NOTE — PATIENT INSTRUCTIONS
[facility] #[phone] or [facility] #[phone] or other local PT facility  Physical Therapy evaluate and treat:  Fall prevention, right knee pain    See Dr. Kumar Bolus #[phone]    Please call Rolf Olivares to help arrange and authorize your tests and/or referrals.   Her # is [phone]     Central scheduling at [facility] #[phone]

## 2018-02-07 NOTE — MR AVS SNAPSHOT
[address] 
[phone] Patient: Liam Mullen MRN: [id number] Visit Information Date & Time Provider Department Dept. Phone Encounter #  
 2018 10:30 AM Sherren Honour, MD 0[address] [facility] [phone] [id number] Upcoming Health Maintenance Date Due  
 GLAUCOMA SCREENING Q2Y 2018 MEDICARE YEARLY EXAM 2018 COLONOSCOPY 2021 DTaP/Tdap/Td series (2 - Td) 2026 Allergies as of 2018  Review Complete On: 2018 By: Michaela Villasenor LPN Severity Noted Reaction Type Reactions Bactrim [Sulfamethoprim]  2012    Swelling Lipitor [Atorvastatin]  07/15/2010    Other (comments) Aches Roxicodone [Oxycodone]  2012    Swelling Tetanus-diphtheria Toxoids-td  07/15/2010    Swelling Allergic to Td? Welchol [Colesevelam]  07/15/2010    Other (comments) Aches Current Immunizations  Reviewed on 2017 Name Date Influenza Vaccine 10/1/2017, 2016, 10/12/2015, 10/18/2014 Influenza Vaccine Split 10/9/2012 Pneumococcal Conjugate (PCV-13) 2016 Pneumococcal Polysaccharide (PPSV-23) 2017 Pneumococcal Vaccine (Pcv) 10/9/2008, 1998 Not reviewed this visit You Were Diagnosed With   
  
 Codes Comments Primary osteoarthritis of right knee    -  Primary ICD-10-CM: M17.11 ICD-9-CM: 715.16 Acute pain of right knee     ICD-10-CM: M25.561 ICD-9-CM: 719.46 Dysthymia     ICD-10-CM: F34.1 ICD-9-CM: 300.4 Risk for falls     ICD-10-CM: Z91.81 
ICD-9-CM: V15.88 Vitals BP Pulse Temp Resp Height(growth percentile) Weight(growth percentile) 140/86 (BP 1 Location: Left arm, BP Patient Position: Sitting) 76 97.6 °F (36.4 °C) (Oral) 16 5' 2\" (1.575 m) 236 lb 3.2 oz (107.1 kg) LMP SpO2 BMI OB Status Smoking Status 05/19/1989 93% 43.2 kg/m2 Postmenopausal Former Smoker Vitals History BMI and BSA Data Body Mass Index Body Surface Area  
 43.2 kg/m 2 2.16 m 2 Preferred Pharmacy [facility] Phone [facility] [address] [phone] Your Updated Medication List  
  
   
This list is accurate as of: 2/7/18 10:59 AM.  Always use your most recent med list.  
  
  
  
  
 ALPRAZolam 0.5 mg tablet Commonly known as:  XANAX  
TAKE 1 TABLET BY MOUTH 3 TIMES A DAY AS NEEDED  
  
 amLODIPine 2.5 mg tablet Commonly known as:  Way [place] TAKE 1 TAB BY MOUTH DAILY. INDICATIONS: HYPERTENSION  
  
 aspirin 81 mg chewable tablet Take 1 Tab by mouth daily. cholecalciferol 1,000 unit Cap Commonly known as:  VITAMIN D3 Take 2,000 Units by mouth daily. FISH OIL PO Take 1 Cap by mouth daily. FLUoxetine 10 mg capsule Commonly known as:  PROzac TAKE 1 CAPSULE BY MOUTH EVERY DAY  
  
 fluticasone 50 mcg/actuation nasal spray Commonly known as:  Theadore Tavo INHALE 2 PUFFS INTO EACH NOSTRIL DAILY losartan 100 mg tablet Commonly known as:  COZAAR  
TAKE 1 TABLET BY MOUTH DAILY. MULTIPLE VITAMIN PO Take  by mouth.  
  
 pantoprazole 40 mg tablet Commonly known as:  PROTONIX  
TAKE 1 TAB BY MOUTH DAILY. traMADol 50 mg tablet Commonly known as:  ULTRAM  
Take 1 Tab by mouth every six (6) hours as needed for Pain. Max Daily Amount: 200 mg. Indications: Pain TYLENOL ARTHRITIS PAIN 650 mg Tete Zhou Generic drug:  acetaminophen Take 650 mg by mouth four (4) times daily. Prescriptions Printed Refills  
 traMADol (ULTRAM) 50 mg tablet 1 Sig: Take 1 Tab by mouth every six (6) hours as needed for Pain. Max Daily Amount: 200 mg. Indications: Pain Class: Print Route: Oral  
  
We Performed the Following REFERRAL TO ORTHOPEDIC SURGERY [REF62 Custom] REFERRAL TO PHYSICAL THERAPY [FBH43 Custom] To-Do List   
 02/07/2018 Imaging:  XR KNEE RT 3 V Referral Information Referral ID Referred By Referred To  
  
 [id number] [address] Phone: [phone] Visits Status Start Date End Date 1 New Request 2/7/18 2/7/19 If your referral has a status of pending review or denied, additional information will be sent to support the outcome of this decision. Referral ID Referred By Referred To  
 [id number] Anastasio Sacks, MD  
   [address] [facility] [address] [place], [address] [place] [address] Phone: [phone] Fax: [phone] Visits Status Start Date End Date 1 New Request 2/7/18 2/7/19 If your referral has a status of pending review or denied, additional information will be sent to support the outcome of this decision. Patient Instructions [facility] #[phone] or [facility] #[phone] or other local PT facility Physical Therapy evaluate and treat:  Fall prevention, right knee pain See Dr. Kylee Andrade #[phone] Please call Mariusz Estrada to help arrange and authorize your tests and/or referrals. Her # is [phone] Central scheduling at [facility] #[phone] Introducing [facility]! Bubba Luna introduces Tego patient portal. Now you can access parts of your medical record, email your doctor's office, and request medication refills online. 1. In your internet browser, go to [email or web address] 2. Click on the First Time User? Click Here link in the Sign In box. You will see the New Member Sign Up page. 3. Enter your Tego Access Code exactly as it appears below. You will not need to use this code after youve completed the sign-up process. If you do not sign up before the expiration date, you must request a new code. · [employer] Access Code: [facility] Expires: 3/19/2018  9:49 AM 
 
4. Enter the last four digits of your Social Security Number (xxxx) and Date of Birth (mm/dd/yyyy) as indicated and click Submit. You will be taken to the next sign-up page. 5. Create a Stryking [employer] ID. This will be your St[employer] login ID and cannot be changed, so think of one that is secure and easy to remember. 6. Create a [employer] password. You can change your password at any time. 7. Enter your Password Reset Question and Answer. This can be used at a later time if you forget your password. 8. Enter your e-mail address. You will receive e-mail notification when new information is available in [address] 9. Click Sign Up. You can now view and download portions of your medical record. 10. Click the Download Summary menu link to download a portable copy of your medical information. If you have questions, please visit the Frequently Asked Questions section of the [employer] website. Remember, [employer] is NOT to be used for urgent needs. For medical emergencies, dial 911. Now available from your iPhone and Android! Please provide this summary of care documentation to your next provider. Your primary care clinician is listed as Crescencio Ayoub. If you have any questions after today's visit, please call [phone].

## 2018-02-07 NOTE — PROGRESS NOTES
Rayma Mortimer is a 78 y.o. female      Issues discussed today include:        Signs and symptoms:  Right knee pain  Duration:  weeks  Context:  She had lateral meniscectomy Dr. Merlos Arm  Location:  Right knee laterally  Quality:  sharp  Severity:  Can be severe  Timing:  daily  Modifying factors:  Impacting mobility    Data reviewed or ordered today:  XR, PT    Other problems include:  Patient Active Problem List   Diagnosis Code    Pseudogout M11.20    Depression F32.9    GERD (gastroesophageal reflux disease) K21.9    ALESIA (obstructive sleep apnea) G47.33    Chest pain R07.9    History of normal resting EKG [id number]    Colon polyp K63.5    S/P JESSICA-BSO Z90.710, Z90.722, Z90.79    Essential hypertension with goal blood pressure less than 140/90 I10    Hyperglycemia R73.9    Knee pain, right M25.561    Osteoarthritis M19.90    Lipid disorder E78.9    Osteoporosis M81.0    Advance directive discussed with patient Z70.80    Health care maintenance Z00.00    Urinary incontinence R32    Anxiety F41.9    Hypercalcemia E83.52    Allergic to tetanus vaccine Z88.7    Obesity, morbid (HCC) E66.01    FH: breast cancer in first degree relative Z80.3       Medications:  Current Outpatient Prescriptions   Medication Sig Dispense Refill    DOCOSAHEXANOIC ACID/EPA (FISH OIL PO) Take 1 Cap by mouth daily.  traMADol (ULTRAM) 50 mg tablet Take 1 Tab by mouth every six (6) hours as needed for Pain. Max Daily Amount: 200 mg. Indications: Pain 45 Tab 1    ALPRAZolam (XANAX) 0.5 mg tablet TAKE 1 TABLET BY MOUTH 3 TIMES A DAY AS NEEDED 60 Tab 0    FLUoxetine (PROZAC) 10 mg capsule TAKE 1 CAPSULE BY MOUTH EVERY DAY 90 Cap 2    pantoprazole (PROTONIX) 40 mg tablet TAKE 1 TAB BY MOUTH DAILY. 90 Tab 3    amLODIPine (NORVASC) 2.5 mg tablet TAKE 1 TAB BY MOUTH DAILY. INDICATIONS: HYPERTENSION 90 Tab 3    losartan (COZAAR) 100 mg tablet TAKE 1 TABLET BY MOUTH DAILY.  90 Tab 3    fluticasone (FLONASE) 50 mcg/actuation nasal spray INHALE 2 PUFFS INTO EACH NOSTRIL DAILY 1 Bottle 11    cholecalciferol (VITAMIN D3) 1,000 unit cap Take 2,000 Units by mouth daily.  aspirin 81 mg chewable tablet Take 1 Tab by mouth daily. 100 Tab 11    MULTIVITAMINS (MULTIPLE VITAMIN PO) Take  by mouth.  acetaminophen (TYLENOL ARTHRITIS PAIN) 650 mg CR tablet Take 650 mg by mouth four (4) times daily. Allergies: Allergies   Allergen Reactions    Bactrim [Sulfamethoprim] Swelling    Lipitor [Atorvastatin] Other (comments)     Aches      Roxicodone [Oxycodone] Swelling    Tetanus-Diphtheria Toxoids-Td Swelling     Allergic to Td?  Welchol [Colesevelam] Other (comments)     Aches         LMP:  Patient's last menstrual period was 05/19/1989. Social History     Social History    Marital status: SINGLE     Spouse name: N/A    Number of children: N/A    Years of education: N/A     Occupational History    Not on file. Social History Main Topics    Smoking status: Former Smoker     Packs/day: 0.50     Quit date: 9/7/2010    Smokeless tobacco: Never Used    Alcohol use No      Comment: RARE    Drug use: No    Sexual activity: No     Other Topics Concern    Not on file     Social History Narrative         Family History   Problem Relation Age of Onset    Diabetes Father     Arthritis-rheumatoid Father     Heart Disease Father     Cancer Maternal Aunt     Breast Cancer Maternal Aunt     Cancer Other     Breast Cancer Sister     Breast Cancer Maternal Aunt          Meaningful use:  done      ROS:  Headaches:  no  Chest Pain:  no  SOB:  no  Fevers:  no  Falls:  no    Other significant ROS:      Patient's last menstrual period was 05/19/1989.     Physical Exam  Visit Vitals    /86 (BP 1 Location: Left arm, BP Patient Position: Sitting)    Pulse 76    Temp 97.6 °F (36.4 °C) (Oral)    Resp 16    Ht 5' 2\" (1.575 m)    Wt 236 lb 3.2 oz (107.1 kg)    LMP 05/19/1989    SpO2 93%    BMI 43.2 kg/m2     BP Readings from Last 3 Encounters:   02/07/18 140/86   12/19/17 153/85   05/30/17 130/82     Constitutional:  Appears well,  No Acute Distress, Vitals noted  Psychiatric:   Affect normal, Alert and cooperative, Oriented to person/place/time    xtremities:  + edema, good peripheral pulses  Skin:   Warm to palpation, without rashes, bruising, or suspicious lesions   MSK:  +OA right knee        Assessment:    Patient Active Problem List   Diagnosis Code    Pseudogout M11.20    Depression F32.9    GERD (gastroesophageal reflux disease) K21.9    ALESIA (obstructive sleep apnea) G47.33    Chest pain R07.9    History of normal resting EKG [id number]    Colon polyp K63.5    S/P JESSICA-BSO Z90.710, Z90.722, Z90.79    Essential hypertension with goal blood pressure less than 140/90 I10    Hyperglycemia R73.9    Knee pain, right M25.561    Osteoarthritis M19.90    Lipid disorder E78.9    Osteoporosis M81.0    Advance directive discussed with patient Z70.80    Health care maintenance Z00.00    Urinary incontinence R32    Anxiety F41.9    Hypercalcemia E83.52    Allergic to tetanus vaccine Z88.7    Obesity, morbid (HCC) E66.01    FH: breast cancer in first degree relative Z80.3       Today's diagnoses are:    ICD-10-CM ICD-9-CM    1. Primary osteoarthritis of right knee M17.11 715.16 traMADol (ULTRAM) 50 mg tablet      REFERRAL TO PHYSICAL THERAPY      REFERRAL TO ORTHOPEDIC SURGERY      XR KNEE RT 3 V   2. Acute pain of right knee M25.561 719.46 XR KNEE RT 3 V   3. Dysthymia F34.1 300.4     2/7/2018:  PHQ9 = 3.  on SSRI   4. Risk for falls Z91.81 V15.88 REFERRAL TO PHYSICAL THERAPY   5.  Anxiety F41.9 300.00     2/7/2018:  PHQ9 = 3       Plan:  Orders Placed This Encounter    XR KNEE RT 3 V     Standing Status:   Future     Standing Expiration Date:   3/9/2019     Order Specific Question:   Reason for Exam     Answer:   knee OA     Order Specific Question:   Is Patient Allergic to Contrast Dye? Answer:   Unknown   [facility] Physical Therapy     Referral Priority:   Routine     Referral Type:   PT/OT/ST     Referral Reason:   Specialty Services Required    REFERRAL TO ORTHOPEDIC SURGERY     Referral Priority:   Routine     Referral Type:   Consultation     Referral Reason:   Specialty Services Required     Referred to Provider:   Alvaro Kirby MD     Requested Specialty:   Orthopedic Surgery    DISCONTD: naproxen sodium (ALEVE) 220 mg tablet     Sig: Take 220 mg by mouth two (2) times daily as needed for Pain.  DOCOSAHEXANOIC ACID/EPA (FISH OIL PO)     Sig: Take 1 Cap by mouth daily.  traMADol (ULTRAM) 50 mg tablet     Sig: Take 1 Tab by mouth every six (6) hours as needed for Pain. Max Daily Amount: 200 mg. Indications: Pain     Dispense:  45 Tab     Refill:  1       See patient instructions  Patient Instructions   [facility] #[phone] or [facility] #[phone] or other local PT facility  Physical Therapy evaluate and treat:  Fall prevention, right knee pain    See Dr. Chuck Irwin #[phone]    Please call Nanette to help arrange and authorize your tests and/or referrals. Her # el [phone]     Central scheduling at [facility] #[phone]            refresh note:  done    AVS Printed:  done      Diagnoses and all orders for this visit:    1. Primary osteoarthritis of right knee  -     traMADol (ULTRAM) 50 mg tablet; Take 1 Tab by mouth every six (6) hours as needed for Pain. Max Daily Amount: 200 mg. Indications: Pain  -     [facility] Physical Therapy  -     REFERRAL TO ORTHOPEDIC SURGERY  -     XR KNEE RT 3 V; Future    2. Acute pain of right knee  -     XR KNEE RT 3 V; Future    3. Dysthymia  Comments:  2/7/2018:  PHQ9 = 3.  on SSRI    4. Risk for falls  -     [facility] Physical Therapy    5.  Anxiety  Comments:  2/7/2018:  PHQ9 = 3

## 2018-02-07 NOTE — PROGRESS NOTES
1. Have you been to the ER, urgent care clinic since your last visit? Hospitalized since your last visit? No    2. Have you seen or consulted any other health care providers outside of the [address] [place] [address] since your last visit? Include any pap smears or colon screening.  No  Chief Complaint   Patient presents with    Blood Pressure Check    Knee Pain     Right knee

## 2018-02-12 ENCOUNTER — TELEPHONE (OUTPATIENT)
Dept: FAMILY MEDICINE CLINIC | Age: 78
End: 2018-02-12

## 2018-02-26 DIAGNOSIS — I10 ESSENTIAL HYPERTENSION WITH GOAL BLOOD PRESSURE LESS THAN 140/90: ICD-10-CM

## 2018-02-26 DIAGNOSIS — I10 ESSENTIAL HYPERTENSION: ICD-10-CM

## 2018-02-28 RX ORDER — LOSARTAN POTASSIUM 100 MG/1
TABLET ORAL
Qty: 90 TAB | Refills: 1 | Status: SHIPPED | OUTPATIENT
Start: 2018-02-28 | End: 2018-05-08 | Stop reason: SDUPTHER

## 2018-03-07 ENCOUNTER — HOSPITAL ENCOUNTER (OUTPATIENT)
Dept: MRI IMAGING | Age: 78
Discharge: HOME | End: 2018-03-07
Attending: ORTHOPAEDIC SURGERY
Payer: MEDICARE

## 2018-03-07 DIAGNOSIS — M54.16 LUMBAR RADICULITIS: ICD-10-CM

## 2018-03-07 PROCEDURE — 72148 MRI LUMBAR SPINE W/O DYE: CPT

## 2018-05-07 ENCOUNTER — TELEPHONE (OUTPATIENT)
Dept: FAMILY MEDICINE CLINIC | Age: 78
End: 2018-05-07

## 2018-05-07 NOTE — TELEPHONE ENCOUNTER
----- Message from Shashank Munguia sent at 5/7/2018 11:38 AM EDT -----  Regarding: Dr. Baig Blend: [phone]  Patient requesting to speak with Dr. Meenakshi Lao regarding a pain specialist.  Best contact number for patient is [phone]

## 2018-05-07 NOTE — TELEPHONE ENCOUNTER
Patient requesting to speak with Dr. Maddie Dukes regarding a pain specialist.  Radha Terrell contact number for patient is [phone]    Dr. Roger Alexis is who I usually use.   #[phone]

## 2018-05-08 ENCOUNTER — OFFICE VISIT (OUTPATIENT)
Dept: FAMILY MEDICINE CLINIC | Age: 78
End: 2018-05-08

## 2018-05-08 VITALS
WEIGHT: 234 LBS | DIASTOLIC BLOOD PRESSURE: 84 MMHG | TEMPERATURE: 97.8 F | RESPIRATION RATE: 20 BRPM | OXYGEN SATURATION: 95 % | HEIGHT: 62 IN | SYSTOLIC BLOOD PRESSURE: 137 MMHG | BODY MASS INDEX: 43.06 KG/M2 | HEART RATE: 83 BPM

## 2018-05-08 DIAGNOSIS — G89.29 OTHER CHRONIC PAIN: ICD-10-CM

## 2018-05-08 DIAGNOSIS — I10 ESSENTIAL HYPERTENSION: Primary | ICD-10-CM

## 2018-05-08 DIAGNOSIS — E66.01 OBESITY, MORBID (HCC): ICD-10-CM

## 2018-05-08 DIAGNOSIS — I10 ESSENTIAL HYPERTENSION WITH GOAL BLOOD PRESSURE LESS THAN 140/90: ICD-10-CM

## 2018-05-08 DIAGNOSIS — M51.36 DDD (DEGENERATIVE DISC DISEASE), LUMBAR: ICD-10-CM

## 2018-05-08 DIAGNOSIS — F34.1 DYSTHYMIA: ICD-10-CM

## 2018-05-08 DIAGNOSIS — Z13.31 SCREENING FOR DEPRESSION: ICD-10-CM

## 2018-05-08 RX ORDER — DULOXETIN HYDROCHLORIDE 30 MG/1
30 CAPSULE, DELAYED RELEASE ORAL DAILY
Qty: 30 CAP | Refills: 3 | Status: SHIPPED | OUTPATIENT
Start: 2018-05-08 | End: 2018-06-26 | Stop reason: SDUPTHER

## 2018-05-08 RX ORDER — LOSARTAN POTASSIUM 100 MG/1
TABLET ORAL
Qty: 90 TAB | Refills: 3 | Status: SHIPPED | OUTPATIENT
Start: 2018-05-08 | End: 2019-10-15 | Stop reason: SDUPTHER

## 2018-05-08 NOTE — ASSESSMENT & PLAN NOTE
Uncontrolled, based on history, physical exam and review of pertinent labs, studies and medications; meds reconciled; continue current treatment plan, lifestyle modifications recommended. Key Obesity Meds     Patient is on no anti-obesity meds.         Lab Results   Component Value Date/Time    Glucose 91 12/19/2017 10:08 AM    LDL,Direct 142 12/19/2017 10:08 AM    Sodium 143 12/19/2017 10:08 AM    Potassium 4.9 12/19/2017 10:08 AM    ALT (SGPT) 29 12/19/2017 10:08 AM    VITAMIN D, 25-HYDROXY 36.4 12/19/2017 10:08 AM

## 2018-05-08 NOTE — PATIENT INSTRUCTIONS
Stop fluoxetine and start Cymbalta 30 mg one pill daily    Restart losartan 100 mg daily and continue amlodipine 2.5 mg dialy    Follow up in 2 weeks    Follow up with your specialists

## 2018-05-08 NOTE — PROGRESS NOTES
Greater than 50% of today's 15 minute visit was counseling or coordination of care for the following reasons:    See diagnoses and orders, see patient instructions    She ran out of her losatan and has been off of it for several days. Will resume. She also updated me on her DDD. She is now s/p ZEENAT via Dr. Ramos Balderrama = 12 despite fluoxetine 10 mg. Will try Cymbalta instead    Diagnoses and all orders for this visit:    1. Essential hypertension  Comments:  not optimal, resume losartan 100 mg and continue amlodipine 2.5 mg.  recheck BP in 2-4 weeks  Orders:  -     losartan (COZAAR) 100 mg tablet; TAKE 1 TABLET BY MOUTH DAILY. 2. DDD (degenerative disc disease), lumbar    3. Essential hypertension with goal blood pressure less than 140/90  Comments:  controlled  Orders:  -     losartan (COZAAR) 100 mg tablet; TAKE 1 TABLET BY MOUTH DAILY. 4. Screening for depression  Comments:  PHQ9 = 12.  swithc prozac to Cymbalta    5. Other chronic pain  -     DULoxetine (CYMBALTA) 30 mg capsule; Take 1 Cap by mouth daily. 6. Dysthymia  -     DULoxetine (CYMBALTA) 30 mg capsule; Take 1 Cap by mouth daily. 7. Obesity, morbid ([address]  Assessment & Plan:  Uncontrolled, based on history, physical exam and review of pertinent labs, studies and medications; meds reconciled; continue current treatment plan, lifestyle modifications recommended. Key Obesity Meds     Patient is on no anti-obesity meds.         Lab Results   Component Value Date/Time    Glucose 91 12/19/2017 10:08 AM    LDL,Direct 142 12/19/2017 10:08 AM    Sodium 143 12/19/2017 10:08 AM    Potassium 4.9 12/19/2017 10:08 AM    ALT (SGPT) 29 12/19/2017 10:08 AM    VITAMIN D, 25-HYDROXY 36.4 12/19/2017 10:08 AM

## 2018-05-08 NOTE — MR AVS SNAPSHOT
[address] 
[phone] Patient: Herrera Anderson MRN: [id number] Visit Information Date & Time Provider Department Dept. Phone Encounter #  
 5/8/2018  1:20 PM Hai Xavier MD [address] [place] [address] [phone] [id number] Upcoming Health Maintenance Date Due Influenza Age 5 to Adult 8/1/2018 GLAUCOMA SCREENING Q2Y 8/9/2018 MEDICARE YEARLY EXAM 12/20/2018 COLONOSCOPY 8/9/2021 DTaP/Tdap/Td series (2 - Td) 8/9/2026 Allergies as of 5/8/2018  Review Complete On: 2/7/2018 By: Hai Xavier MD  
  
 Severity Noted Reaction Type Reactions Bactrim [Sulfamethoprim]  03/22/2012    Swelling Lipitor [Atorvastatin]  07/15/2010    Other (comments) Aches Roxicodone [Oxycodone]  03/22/2012    Swelling Tetanus-diphtheria Toxoids-td  07/15/2010    Swelling Allergic to Td? Welchol [Colesevelam]  07/15/2010    Other (comments) Aches Current Immunizations  Reviewed on 12/19/2017 Name Date Influenza Vaccine 10/1/2017, 9/22/2016, 10/12/2015, 10/18/2014 Influenza Vaccine Split 10/9/2012 Pneumococcal Conjugate (PCV-13) 9/22/2016 Pneumococcal Polysaccharide (PPSV-23) 12/19/2017 Pneumococcal Vaccine (Pcv) 10/9/2008, 2/2/1998 Not reviewed this visit You Were Diagnosed With   
  
 Codes Comments Essential hypertension    -  Primary ICD-10-CM: I10 
ICD-9-CM: 401.9 not optimal, resume losartan 100 mg and continue amlodipine 2.5 mg.  recheck BP in 2-4 weeks DDD (degenerative disc disease), lumbar     ICD-10-CM: M51.36 
ICD-9-CM: 722.52 Essential hypertension with goal blood pressure less than 140/90     ICD-10-CM: I10 
ICD-9-CM: 401.9 controlled Screening for depression     ICD-10-CM: Z13.89 ICD-9-CM: V79.0 PHQ9 = 12.  swithc prozac to Cymbalta Other chronic pain     ICD-10-CM: G89.29 ICD-9-CM: 338.29   
 Dysthymia     ICD-10-CM: F34.1 ICD-9-CM: 300.4 Obesity, morbid ([facility]     ICD-10-CM: E66.01 
ICD-9-CM: 278.01 Vitals BP Pulse Temp Resp Height(growth percentile) Weight(growth percentile) 137/84 (BP 1 Location: Left arm, BP Patient Position: Sitting) 83 97.8 °F (36.6 °C) (Oral) 20 5' 2\" (1.575 m) 234 lb (106.1 kg) LMP SpO2 BMI OB Status Smoking Status 05/19/1989 95% 42.8 kg/m2 Postmenopausal Former Smoker Vitals History BMI and BSA Data Body Mass Index Body Surface Area  
 42.8 kg/m 2 2.15 m 2 Preferred Pharmacy Pharmacy [facility] Phone [facility] [address] [place] [address] [phone] Your Updated Medication List  
  
   
This list is accurate as of 5/8/18  2:03 PM.  Always use your most recent med list.  
  
  
  
  
 ALPRAZolam 0.5 mg tablet Commonly known as:  XANAX  
TAKE 1 TABLET BY MOUTH 3 TIMES A DAY AS NEEDED  
  
 amLODIPine 2.5 mg tablet Commonly known as:  Caralee Dupes TAKE 1 TAB BY MOUTH DAILY. INDICATIONS: HYPERTENSION  
  
 aspirin 81 mg chewable tablet Take 1 Tab by mouth daily. cholecalciferol 1,000 unit Cap Commonly known as:  VITAMIN D3 Take 2,000 Units by mouth daily. DULoxetine 30 mg capsule Commonly known as:  CYMBALTA Take 1 Cap by mouth daily. FISH OIL PO Take 1 Cap by mouth daily. fluticasone 50 mcg/actuation nasal spray Commonly known as:  Cuauhtemoc Carlton INHALE 2 PUFFS INTO EACH NOSTRIL DAILY losartan 100 mg tablet Commonly known as:  COZAAR  
TAKE 1 TABLET BY MOUTH DAILY. MULTIPLE VITAMIN PO Take  by mouth.  
  
 pantoprazole 40 mg tablet Commonly known as:  PROTONIX  
TAKE 1 TAB BY MOUTH DAILY. traMADol 50 mg tablet Commonly known as:  ULTRAM  
Take 1 Tab by mouth every six (6) hours as needed for Pain. Max Daily Amount: 200 mg. Indications: Pain TYLENOL ARTHRITIS PAIN 650 mg Moise Barnard Generic drug:  acetaminophen Take 650 mg by mouth four (4) times daily. Prescriptions Sent to Pharmacy Refills  
 losartan (COZAAR) 100 mg tablet 3 Sig: TAKE 1 TABLET BY MOUTH DAILY. Class: Normal  
 Pharmacy: [address] Ph #: [phone] DULoxetine (CYMBALTA) 30 mg capsule 3 Sig: Take 1 Cap by mouth daily. Class: Normal  
 Pharmacy: [address] Ph #: [phone] Route: Oral  
  
Patient Instructions Stop fluoxetine and start Cymbalta 30 mg one pill daily Restart losartan 100 mg daily and continue amlodipine 2.5 mg dialy Follow up in 2 weeks Follow up with your specialists Introducing [facility] & HEALTH SERVICES! [facility] introduces [employer] patient portal. Now you can access parts of your medical record, email your doctor's office, and request medication refills online. 1. In your internet browser, go to [email or web address] 2. Click on the First Time User? Click Here link in the Sign In box. You will see the New Member Sign Up page. 3. Enter your [employer] Access Code exactly as it appears below. You will not need to use this code after youve completed the sign-up process. If you do not sign up before the expiration date, you must request a new code. · [employer] Access Code: [email or web address] Expires: 6/18/2018  3:05 PM 
 
4. Enter the last four digits of your Social Security Number (xxxx) and Date of Birth (mm/dd/yyyy) as indicated and click Submit. You will be taken to the next sign-up page. 5. Create a [employer] ID. This will be your [employer] login ID and cannot be changed, so think of one that is secure and easy to remember. 6. Create a [employer] password. You can change your password at any time. 7. Enter your Password Reset Question and Answer. This can be used at a later time if you forget your password. 8. Enter your e-mail address. You will receive e-mail notification when new information is available in [address] 9. Click Sign Up. You can now view and download portions of your medical record. 10. Click the Download Summary menu link to download a portable copy of your medical information. If you have questions, please visit the Frequently Asked Questions section of the [employer] website. Remember, [employer] is NOT to be used for urgent needs. For medical emergencies, dial 911. Now available from your iPhone and Android! Please provide this summary of care documentation to your next provider. Your primary care clinician is listed as Angelito Gilliland. If you have any questions after today's visit, please call [phone].

## 2018-05-23 ENCOUNTER — TELEPHONE (OUTPATIENT)
Dept: FAMILY MEDICINE CLINIC | Age: 78
End: 2018-05-23

## 2018-06-23 DIAGNOSIS — M17.11 PRIMARY OSTEOARTHRITIS OF RIGHT KNEE: ICD-10-CM

## 2018-06-23 RX ORDER — TRAMADOL HYDROCHLORIDE 50 MG/1
TABLET ORAL
Qty: 45 TAB | Refills: 1 | Status: SHIPPED | OUTPATIENT
Start: 2018-06-23

## 2018-06-26 ENCOUNTER — OFFICE VISIT (OUTPATIENT)
Dept: FAMILY MEDICINE CLINIC | Age: 78
End: 2018-06-26

## 2018-06-26 VITALS
HEIGHT: 62 IN | OXYGEN SATURATION: 97 % | RESPIRATION RATE: 18 BRPM | TEMPERATURE: 97.8 F | BODY MASS INDEX: 41.96 KG/M2 | DIASTOLIC BLOOD PRESSURE: 82 MMHG | WEIGHT: 228 LBS | HEART RATE: 87 BPM | SYSTOLIC BLOOD PRESSURE: 130 MMHG

## 2018-06-26 DIAGNOSIS — F41.9 ANXIETY: ICD-10-CM

## 2018-06-26 DIAGNOSIS — I10 ESSENTIAL HYPERTENSION WITH GOAL BLOOD PRESSURE LESS THAN 140/90: ICD-10-CM

## 2018-06-26 DIAGNOSIS — G89.29 OTHER CHRONIC PAIN: ICD-10-CM

## 2018-06-26 DIAGNOSIS — F34.1 DYSTHYMIA: Primary | ICD-10-CM

## 2018-06-26 RX ORDER — BUMETANIDE 0.5 MG/1
TABLET ORAL DAILY
COMMUNITY

## 2018-06-26 RX ORDER — DULOXETIN HYDROCHLORIDE 30 MG/1
30 CAPSULE, DELAYED RELEASE ORAL DAILY
Qty: 90 CAP | Refills: 3 | Status: SHIPPED | OUTPATIENT
Start: 2018-06-26

## 2018-06-26 RX ORDER — DOCUSATE SODIUM 250 MG
250 CAPSULE ORAL DAILY
COMMUNITY

## 2018-06-26 NOTE — PROGRESS NOTES
Rosie Juarez is a 78 y.o. female      Issues discussed today include:    BP check:  Looks good    She is getting back injections again soon    She need OP exam soon    Data reviewed or ordered today:  PHQ9 = 0    Other problems include:  Patient Active Problem List   Diagnosis Code    Pseudogout M11.20    Depression F32.9    GERD (gastroesophageal reflux disease) K21.9    ALESIA (obstructive sleep apnea) G47.33    Chest pain R07.9    History of normal resting EKG [id number]    Colon polyp K63.5    S/P JESSICA-BSO Z90.710, Z90.722, Z90.79    Essential hypertension with goal blood pressure less than 140/90 I10    Hyperglycemia R73.9    Knee pain, right M25.561    Osteoarthritis M19.90    Lipid disorder E78.9    Osteoporosis M81.0    Advance directive discussed with patient Z70.80    Health care maintenance Z00.00    Urinary incontinence R32    Anxiety F41.9    Hypercalcemia E83.52    Allergic to tetanus vaccine Z88.7    Obesity, morbid (HCC) E66.01    FH: breast cancer in first degree relative Z80.3    DDD (degenerative disc disease), lumbar M51.36       Medications:  Current Outpatient Prescriptions   Medication Sig Dispense Refill    bumetanide (BUMEX) 0.5 mg tablet Take  by mouth daily.  calcium carbonate/vitamin D3 (CALCIUM + D PO) Take 1,200 mg by mouth daily.  docusate sodium (DOK) 250 mg capsule Take 250 mg by mouth daily.  DULoxetine (CYMBALTA) 30 mg capsule Take 1 Cap by mouth daily. 90 Cap 3    traMADol (ULTRAM) 50 mg tablet TAKE 1 TABLET BY MOUTH EVERY 6 HRS AS NEEDED FOR PAIN 45 Tab 1    losartan (COZAAR) 100 mg tablet TAKE 1 TABLET BY MOUTH DAILY. 90 Tab 3    DOCOSAHEXANOIC ACID/EPA (FISH OIL PO) Take 1 Cap by mouth daily.  ALPRAZolam (XANAX) 0.5 mg tablet TAKE 1 TABLET BY MOUTH 3 TIMES A DAY AS NEEDED 60 Tab 0    pantoprazole (PROTONIX) 40 mg tablet TAKE 1 TAB BY MOUTH DAILY. 90 Tab 3    amLODIPine (NORVASC) 2.5 mg tablet TAKE 1 TAB BY MOUTH DAILY.  INDICATIONS: HYPERTENSION 90 Tab 3    fluticasone (FLONASE) 50 mcg/actuation nasal spray INHALE 2 PUFFS INTO EACH NOSTRIL DAILY 1 Bottle 11    acetaminophen (TYLENOL ARTHRITIS PAIN) 650 mg CR tablet Take 650 mg by mouth four (4) times daily.  aspirin 81 mg chewable tablet Take 1 Tab by mouth daily. 100 Tab 11    cholecalciferol (VITAMIN D3) 1,000 unit cap Take 2,000 Units by mouth daily.  MULTIVITAMINS (MULTIPLE VITAMIN PO) Take  by mouth. Allergies: Allergies   Allergen Reactions    Bactrim [Sulfamethoprim] Swelling    Lipitor [Atorvastatin] Other (comments)     Aches      Roxicodone [Oxycodone] Swelling    Tetanus-Diphtheria Toxoids-Td Swelling     Allergic to Td?  Welchol [Colesevelam] Other (comments)     Aches         LMP:  Patient's last menstrual period was 05/19/1989. Social History     Social History    Marital status: SINGLE     Spouse name: N/A    Number of children: N/A    Years of education: N/A     Occupational History    Not on file. Social History Main Topics    Smoking status: Former Smoker     Packs/day: 0.50     Quit date: 9/7/2010    Smokeless tobacco: Never Used    Alcohol use No      Comment: RARE    Drug use: No    Sexual activity: No     Other Topics Concern    Not on file     Social History Narrative         Family History   Problem Relation Age of Onset    Diabetes Father     Arthritis-rheumatoid Father     Heart Disease Father     Cancer Maternal Aunt     Breast Cancer Maternal Aunt     Cancer Other     Breast Cancer Sister     Breast Cancer Maternal Aunt          Meaningful use:  done      ROS:  Headaches:  no  Chest Pain:  no  SOB:  no  Fevers:  no  Falls:  no  anxiety/depression/losing interest in doing things that were previously enjoyed:  no. PHQ2 = 0, PHQ9 = 0  Other significant ROS:      Patient's last menstrual period was 05/19/1989.     Physical Exam  Visit Vitals    /82 (BP 1 Location: Left arm, BP Patient Position: Sitting)    Pulse 87    Temp 97.8 °F (36.6 °C) (Oral)    Resp 18    Ht 5' 2\" (1.575 m)    Wt 228 lb (103.4 kg)    LMP 05/19/1989    SpO2 97%    BMI 41.7 kg/m2     BP Readings from Last 3 Encounters:   06/26/18 130/82   05/08/18 137/84   02/07/18 140/86     Constitutional:  Appears well,  No Acute Distress, Vitals noted  Psychiatric:   Affect normal, Alert and cooperative, Oriented to person/place/time        Assessment:    Patient Active Problem List   Diagnosis Code    Pseudogout M11.20    Depression F32.9    GERD (gastroesophageal reflux disease) K21.9    ALESIA (obstructive sleep apnea) G47.33    Chest pain R07.9    History of normal resting EKG [id number]    Colon polyp K63.5    S/P JESSICA-BSO Z90.710, Z90.722, Z90.79    Essential hypertension with goal blood pressure less than 140/90 I10    Hyperglycemia R73.9    Knee pain, right M25.561    Osteoarthritis M19.90    Lipid disorder E78.9    Osteoporosis M81.0    Advance directive discussed with patient Z70.80    Health care maintenance Z00.00    Urinary incontinence R32    Anxiety F41.9    Hypercalcemia E83.52    Allergic to tetanus vaccine Z88.7    Obesity, morbid (HCC) E66.01    FH: breast cancer in first degree relative Z80.3    DDD (degenerative disc disease), lumbar M51.36       Today's diagnoses are:    ICD-10-CM ICD-9-CM    1. Dysthymia F34.1 300.4 DULoxetine (CYMBALTA) 30 mg capsule    stable on Cymbalta 30 mg  PHQ9 = 0   2. Other chronic pain G89.29 338.29 DULoxetine (CYMBALTA) 30 mg capsule   3. Essential hypertension with goal blood pressure less than 140/90 I10 401.9     132/80 controlled   4. Anxiety F41.9 300.00        Plan:  Orders Placed This Encounter    bumetanide (BUMEX) 0.5 mg tablet     Sig: Take  by mouth daily.  calcium carbonate/vitamin D3 (CALCIUM + D PO)     Sig: Take 1,200 mg by mouth daily.  docusate sodium (DOK) 250 mg capsule     Sig: Take 250 mg by mouth daily.     DULoxetine (CYMBALTA) 30 mg capsule     Sig: Take 1 Cap by mouth daily.      Dispense:  90 Cap     Refill:  3       See patient instructions  Patient Instructions   See letter        refresh note:  done    AVS Printed:  done

## 2018-06-26 NOTE — MR AVS SNAPSHOT
[address] 
[phone] Patient: Onur Robertson MRN: [id number]:[id number] Visit Information Date & Time Provider Department Dept. Phone Encounter #  
 6/26/2018  2:50 PM Dangelo Urbina MD [facility] [phone] [id number] Follow-up Instructions Return if symptoms worsen or fail to improve. Upcoming Health Maintenance Date Due  
 GLAUCOMA SCREENING Q2Y 8/9/2018 Influenza Age 5 to Adult 8/1/2018 MEDICARE YEARLY EXAM 12/20/2018 COLONOSCOPY 8/9/2021 DTaP/Tdap/Td series (2 - Td) 8/9/2026 Allergies as of 6/26/2018  Review Complete On: 6/26/2018 By: Indy Hanley LPN Severity Noted Reaction Type Reactions Bactrim [Sulfamethoprim]  03/22/2012    Swelling Lipitor [Atorvastatin]  07/15/2010    Other (comments) Aches Roxicodone [Oxycodone]  03/22/2012    Swelling Tetanus-diphtheria Toxoids-td  07/15/2010    Swelling Allergic to Td? Welchol [Colesevelam]  07/15/2010    Other (comments) Aches Current Immunizations  Reviewed on 12/19/2017 Name Date Influenza Vaccine 10/1/2017, 9/22/2016, 10/12/2015, 10/18/2014 Influenza Vaccine Split 10/9/2012 Pneumococcal Conjugate (PCV-13) 9/22/2016 Pneumococcal Polysaccharide (PPSV-23) 12/19/2017 Pneumococcal Vaccine (Pcv) 10/9/2008, 2/2/1998 Not reviewed this visit You Were Diagnosed With   
  
 Codes Comments Dysthymia    -  Primary ICD-10-CM: F34.1 ICD-9-CM: 300.4 stable on Cymbalta 30 mg 
PHQ9 = 0 Other chronic pain     ICD-10-CM: G89.29 ICD-9-CM: 338.29 Essential hypertension with goal blood pressure less than 140/90     ICD-10-CM: I10 
ICD-9-CM: 401.9 132/80 controlled Anxiety     ICD-10-CM: F41.9 ICD-9-CM: 300.00 Vitals BP Pulse Temp Resp Height(growth percentile) Weight(growth percentile) 130/82 (BP 1 Location: Left arm, BP Patient Position: Sitting) 87 97.8 °F (36.6 °C) (Oral) 18 5' 2\" (1.575 m) 228 lb (103.4 kg) LMP SpO2 BMI OB Status Smoking Status 05/19/1989 97% 41.7 kg/m2 Postmenopausal Former Smoker BMI and BSA Data Body Mass Index Body Surface Area 41.7 kg/m 2 2.13 m 2 Preferred Pharmacy [facility] [address] [place] [address], [address] [place] [address] [phone] Your Updated Medication List  
  
   
This list is accurate as of 6/26/18  3:10 PM.  Always use your most recent med list.  
  
  
  
  
 ALPRAZolam 0.5 mg tablet Commonly known as:  XANAX  
TAKE 1 TABLET BY MOUTH 3 TIMES A DAY AS NEEDED  
  
 amLODIPine 2.5 mg tablet Commonly known as:  Leward Avila TAKE 1 TAB BY MOUTH DAILY. INDICATIONS: HYPERTENSION  
  
 aspirin 81 mg chewable tablet Take 1 Tab by mouth daily. bumetanide 0.5 mg tablet Commonly known as:  Sebastian Singleton Take  by mouth daily. CALCIUM + D PO Take 1,200 mg by mouth daily. cholecalciferol 1,000 unit Cap Commonly known as:  VITAMIN D3 Take 2,000 Units by mouth daily. docusate sodium 250 mg capsule Commonly known as:  [address] Take 250 mg by mouth daily. DULoxetine 30 mg capsule Commonly known as:  CYMBALTA Take 1 Cap by mouth daily. FISH OIL PO Take 1 Cap by mouth daily. fluticasone 50 mcg/actuation nasal spray Commonly known as:  Cole Najera INHALE 2 PUFFS INTO EACH NOSTRIL DAILY losartan 100 mg tablet Commonly known as:  COZAAR  
TAKE 1 TABLET BY MOUTH DAILY. MULTIPLE VITAMIN PO Take  by mouth.  
  
 pantoprazole 40 mg tablet Commonly known as:  PROTONIX  
TAKE 1 TAB BY MOUTH DAILY. traMADol 50 mg tablet Commonly known as:  ULTRAM  
TAKE 1 TABLET BY MOUTH EVERY 6 HRS AS NEEDED FOR PAIN  
  
 TYLENOL ARTHRITIS PAIN 650 mg Tber Generic drug:  acetaminophen Take 650 mg by mouth four (4) times daily. Prescriptions Sent to Pharmacy Refills DULoxetine (CYMBALTA) 30 mg capsule 3 Sig: Take 1 Cap by mouth daily. Class: Normal  
 Pharmacy: [address] #: [phone] Route: Oral  
  
Follow-up Instructions Return if symptoms worsen or fail to improve. Patient Instructions See letter Introducing [facility] & HEALTH SERVICES! Emma Shin introduces [employer] patient portal. Now you can access parts of your medical record, email your doctor's office, and request medication refills online. 1. In your internet browser, go to [email or web address] 2. Click on the First Time User? Click Here link in the Sign In box. You will see the New Member Sign Up page. 3. Enter your [employer] Access Code exactly as it appears below. You will not need to use this code after youve completed the sign-up process. If you do not sign up before the expiration date, you must request a new code. · [employer] Access Code: [email or web address] Expires: 9/24/2018  3:08 PM 
 
4. Enter the last four digits of your Social Security Number (xxxx) and Date of Birth (mm/dd/yyyy) as indicated and click Submit. You will be taken to the next sign-up page. 5. Create a [employer] ID. This will be your [employer] login ID and cannot be changed, so think of one that is secure and easy to remember. 6. Create a [employer] password. You can change your password at any time. 7. Enter your Password Reset Question and Answer. This can be used at a later time if you forget your password. 8. Enter your e-mail address. You will receive e-mail notification when new information is available in [address] 9. Click Sign Up. You can now view and download portions of your medical record. 10. Click the Download Summary menu link to download a portable copy of your medical information.  
 
If you have questions, please visit the Frequently Asked Questions section of the [employer]. Remember, [employer] is NOT to be used for urgent needs. For medical emergencies, dial 911. Now available from your iPhone and Android! Please provide this summary of care documentation to your next provider. Your primary care clinician is listed as Maurisio Mccloud. If you have any questions after today's visit, please call [phone].

## 2018-06-26 NOTE — LETTER
6/26/2018 3:06 PM 
 
Ms. Brea Inmna [address] [facility] [place] [address] [id number] Body mass index is 41.7 kg/(m^2). Focus on regular exercise (150 minutes each week) and healthy eating. Eat more fruits and vegetables. Eat more protein (egg whites, beans, and nuts you know you tolerate) and less carbohydrates (white bread, white rice, white pasta, white potatoes, sodas, and sweets). Eat appropriately small portion sizes. Eye exam soon. Follow up with your specialists. Sincerely, Barbara Person MD

## 2018-06-26 NOTE — PROGRESS NOTES
1. Have you been to the ER, urgent care clinic since your last visit? Hospitalized since your last visit? No    2. Have you seen or consulted any other health care providers outside of the [address] [place] [address] since your last visit? Include any pap smears or colon screening. No    Chief Complaint   Patient presents with    Blood Pressure Check       Patient states back pain 7/10, took Tramadol before visit. Blood pressure 130/82, pulse 87, temperature 97.8 °F (36.6 °C), temperature source Oral, resp. rate 18, height 5' 2\" (1.575 m), weight 228 lb (103.4 kg), last menstrual period 05/19/1989, SpO2 97 %.

## 2018-10-08 ENCOUNTER — HOSPITAL ENCOUNTER (OUTPATIENT)
Dept: MAMMOGRAPHY | Age: 78
Discharge: HOME | End: 2018-10-08
Payer: MEDICARE

## 2018-10-08 DIAGNOSIS — Z12.39 SCREENING BREAST EXAMINATION: ICD-10-CM

## 2018-10-08 PROCEDURE — 77063 BREAST TOMOSYNTHESIS BI: CPT

## 2018-12-13 DIAGNOSIS — K21.9 GASTROESOPHAGEAL REFLUX DISEASE WITHOUT ESOPHAGITIS: Primary | ICD-10-CM

## 2018-12-13 DIAGNOSIS — I10 ESSENTIAL HYPERTENSION: ICD-10-CM

## 2018-12-13 RX ORDER — AMLODIPINE BESYLATE 2.5 MG/1
TABLET ORAL
Qty: 90 TAB | Refills: 3 | Status: SHIPPED | OUTPATIENT
Start: 2018-12-13 | End: 2019-08-01

## 2018-12-13 RX ORDER — RANITIDINE 300 MG/1
300 TABLET ORAL DAILY
Qty: 90 TAB | Refills: 1 | Status: SHIPPED | OUTPATIENT
Start: 2018-12-13

## 2019-08-01 ENCOUNTER — OFFICE VISIT (OUTPATIENT)
Dept: CARDIOLOGY CLINIC | Age: 79
End: 2019-08-01

## 2019-08-01 VITALS — HEIGHT: 62 IN | WEIGHT: 222.6 LBS | RESPIRATION RATE: 16 BRPM | BODY MASS INDEX: 40.96 KG/M2

## 2019-08-01 DIAGNOSIS — R60.0 LEG EDEMA: ICD-10-CM

## 2019-08-01 DIAGNOSIS — E66.01 CLASS 3 SEVERE OBESITY WITHOUT SERIOUS COMORBIDITY WITH BODY MASS INDEX (BMI) OF 40.0 TO 44.9 IN ADULT, UNSPECIFIED OBESITY TYPE (HCC): ICD-10-CM

## 2019-08-01 DIAGNOSIS — I10 ESSENTIAL HYPERTENSION: Primary | ICD-10-CM

## 2019-08-01 DIAGNOSIS — I10 ESSENTIAL HYPERTENSION: ICD-10-CM

## 2019-08-01 DIAGNOSIS — R00.2 PALPITATIONS: ICD-10-CM

## 2019-08-01 DIAGNOSIS — E78.5 DYSLIPIDEMIA: ICD-10-CM

## 2019-08-01 RX ORDER — ROSUVASTATIN CALCIUM 5 MG/1
5 TABLET, COATED ORAL
Qty: 30 TAB | Refills: 5 | Status: SHIPPED | OUTPATIENT
Start: 2019-08-01

## 2019-08-01 RX ORDER — OMEPRAZOLE 40 MG/1
40 CAPSULE, DELAYED RELEASE ORAL 2 TIMES DAILY
COMMUNITY

## 2019-08-01 RX ORDER — NEBIVOLOL 2.5 MG/1
5 TABLET ORAL DAILY
Qty: 30 TAB | Refills: 4 | Status: SHIPPED | OUTPATIENT
Start: 2019-08-01 | End: 2019-08-21 | Stop reason: SDUPTHER

## 2019-08-01 RX ORDER — PANTOPRAZOLE SODIUM 40 MG/1
40 TABLET, DELAYED RELEASE ORAL
COMMUNITY

## 2019-08-01 NOTE — PATIENT INSTRUCTIONS
1) Start Bystolic 5 mg daily  2) Stop Norvasc (amlodpine) because this may  3) LDL is 140 and should be under 100. Start Crestor 5mg daily and get labs checked again in a few weeks.   4) Check echo in 2 weeks with a blood pressure check

## 2019-08-01 NOTE — Clinical Note
8/1/19 Patient: Taina Larsen YOB: 1940 Date of Visit: 8/1/2019 Ciara Quintanilla MD 
VIA Dear Ciara Quintanilla MD, Thank you for referring Ms. Poppy Noel to [facility] for evaluation. My notes for this consultation are attached. If you have questions, please do not hesitate to call me. I look forward to following your patient along with you.  
 
 
Sincerely, 
 
Ashli Saleem MD

## 2019-08-01 NOTE — PROGRESS NOTES
LAST OFFICE VISIT : Visit date not found        ICD-10-CM ICD-9-CM   1. Essential hypertension I10 401.9   2. Palpitations R00.2 785.1   3. Dyslipidemia E78.5 272.4   4. Leg edema R60.0 782.3   5. Class 3 severe obesity without serious comorbidity with body mass index (BMI) of 40.0 to 44.9 in adult, unspecified obesity type ([facility] [address] E66.01 278.01    Z68.41 V85.41            Naeem Romero is a 70 y.o. female with dizziness, swelling referred by Ja Roberts MD .      Cardiac risk factors: family history, dyslipidemia, hypertension, post-menopausal  I have personally obtained the history from the patient. HISTORY OF PRESENTING ILLNESS     Vannessa Pleitez reports she is feeling well overall. She is sedentary around the home, but she stays busy taking care of her grandchildren. She is adherent with CPAP. The patient denies chest pain/ shortness of breath, orthopnea, PND, LE edema, syncope, presyncope or fatigue.          ACTIVE PROBLEM LIST     Patient Active Problem List    Diagnosis Date Noted    DDD (degenerative disc disease), lumbar 05/08/2018    Obesity, morbid ([facility] [address] 12/19/2017    FH: breast cancer in first degree relative 12/19/2017    Allergic to tetanus vaccine 08/09/2016    Hypercalcemia 02/09/2016    Anxiety 04/28/2015    Urinary incontinence 11/18/2014    Advance directive discussed with patient 10/07/2014    Health care maintenance 10/07/2014    Osteoporosis 10/15/2012    Lipid disorder 10/10/2012    Knee pain, right 10/09/2012    Osteoarthritis 10/09/2012    Hyperglycemia 03/15/2012    Chest pain 03/17/2011    History of normal resting EKG 03/17/2011    Colon polyp 03/17/2011    S/P JESSICA-BSO 03/17/2011    Essential hypertension with goal blood pressure less than 140/90 03/17/2011    Pseudogout 07/15/2010    Depression 07/15/2010    GERD (gastroesophageal reflux disease) 07/15/2010    ALESIA (obstructive sleep apnea) 07/15/2010           PAST MEDICAL HISTORY     Past Medical History:   Diagnosis Date    Current smoker     4 cig pd    Depression 7/15/2010    GERD (gastroesophageal reflux disease) 7/15/2010    High cholesterol 7/15/2010    HTN (hypertension)     Obesity (BMI 30-39. 9)     ALESIA (obstructive sleep apnea) 7/15/2010    Osteopenia 7/15/2010    Pseudogout 7/15/2010           PAST SURGICAL HISTORY     Past Surgical History:   Procedure Laterality Date    ENDOSCOPY, COLON, DIAGNOSTIC      Dr. Scott Paci HX GYN  late     JESSICA, one ovary intact, due to bleeding    HX ORTHOPAEDIC      right wrist and neck    HX UROLOGICAL      Bladder sling - Dr. Trey Wells     Allergies   Allergen Reactions    Bactrim [Sulfamethoprim] Swelling    Lipitor [Atorvastatin] Other (comments)     Aches      Roxicodone [Oxycodone] Swelling    Tetanus-Diphtheria Toxoids-Td Swelling     Allergic to Td?     Welchol [Colesevelam] Other (comments)     Aches            FAMILY HISTORY     Family History   Problem Relation Age of Onset    Diabetes Father     Arthritis-rheumatoid Father     Heart Disease Father     Cancer Maternal Aunt     Breast Cancer Maternal Aunt     Cancer Other     Breast Cancer Sister     Breast Cancer Maternal Aunt     negative for cardiac disease       SOCIAL HISTORY     Social History     Socioeconomic History    Marital status: SINGLE     Spouse name: Not on file    Number of children: Not on file    Years of education: Not on file    Highest education level: Not on file   Tobacco Use    Smoking status: Former Smoker     Packs/day: 0.50     Last attempt to quit: 2010     Years since quittin.9    Smokeless tobacco: Never Used   Substance and Sexual Activity    Alcohol use: No     Alcohol/week: 0.8 standard drinks     Types: 1 Glasses of wine per week     Comment: RARE    Drug use: No    Sexual activity: Never         MEDICATIONS     Current Outpatient Medications   Medication Sig    cpap machine kit by Does Not Apply route.  pantoprazole (PROTONIX) 40 mg tablet Take 40 mg by mouth nightly.  omeprazole (PRILOSEC) 40 mg capsule Take 40 mg by mouth two (2) times a day.  rosuvastatin (CRESTOR) 5 mg tablet Take 1 Tab by mouth nightly.  nebivolol (BYSTOLIC) 2.5 mg tablet Take 2 Tabs by mouth daily.  raNITIdine (ZANTAC) 300 mg tab Take 1 Tab by mouth daily.  bumetanide (BUMEX) 0.5 mg tablet Take  by mouth daily.  calcium carbonate/vitamin D3 (CALCIUM + D PO) Take 1,200 mg by mouth daily.  DULoxetine (CYMBALTA) 30 mg capsule Take 1 Cap by mouth daily.  traMADol (ULTRAM) 50 mg tablet TAKE 1 TABLET BY MOUTH EVERY 6 HRS AS NEEDED FOR PAIN    losartan (COZAAR) 100 mg tablet TAKE 1 TABLET BY MOUTH DAILY.  fluticasone (FLONASE) 50 mcg/actuation nasal spray INHALE 2 PUFFS INTO EACH NOSTRIL DAILY    acetaminophen (TYLENOL ARTHRITIS PAIN) 650 mg CR tablet Take 650 mg by mouth four (4) times daily.  aspirin 81 mg chewable tablet Take 1 Tab by mouth daily.  MULTIVITAMINS (MULTIPLE VITAMIN PO) Take  by mouth.  docusate sodium (DOK) 250 mg capsule Take 250 mg by mouth daily.  ALPRAZolam (XANAX) 0.5 mg tablet TAKE 1 TABLET BY MOUTH 3 TIMES A DAY AS NEEDED    cholecalciferol (VITAMIN D3) 1,000 unit cap Take 2,000 Units by mouth daily. No current facility-administered medications for this visit. I have reviewed the nurses notes, vitals, problem list, allergy list, medical history, family, social history and medications. REVIEW OF SYMPTOMS      General: Pt denies excessive weight gain or loss. Pt is able to conduct ADL's  HEENT: Denies blurred vision, headaches, hearing loss, epistaxis and difficulty swallowing. Respiratory: Denies cough, congestion, shortness of breath, PARRISH, wheezing or stridor.   Cardiovascular: Denies precordial pain, edema or PND +palpitations  Gastrointestinal: Denies poor appetite, indigestion, abdominal pain or blood in stool  Genitourinary: Denies hematuria, dysuria, increased urinary frequency  Musculoskeletal: Denies joint pain or swelling from muscles or joints  Neurologic: Denies tremor, paresthesias, headache, or sensory motor disturbance  Psychiatric: Denies confusion, insomnia, depression  Integumentray: Denies rash, itching or ulcers. Hematologic: Denies easy bruising, bleeding     PHYSICAL EXAMINATION      Vitals:    08/01/19 1050   Resp: 16   Weight: 222 lb 9.6 oz (101 kg)   Height: 5' 2\" (1.575 m)     General: Well developed, in no acute distress. HEENT: No jaundice, oral mucosa moist, no oral ulcers  Neck: Supple, no stiffness, no lymphadenopathy, supple  Heart:  Normal S1/S2 negative S3 or S4. Regular, no murmur, gallop or rub, no jugular venous distention  Respiratory: Clear bilaterally x 4, no wheezing or rales  Abdomen:   Soft, non-tender, bowel sounds are active.   Extremities:  normal cap refill, no cyanosis. +LE edema  Musculoskeletal: No clubbing, no deformities  Neuro: A&Ox3, speech clear, gait stable, cooperative, no focal neurologic deficits  Skin: Skin color is normal. No rashes or lesions. Non diaphoretic, moist.  Vascular: 2+ pulses symmetric in all extremities        EKG:      DIAGNOSTIC DATA     1. Stress Echo   8/11/09 - normal    2. Echo   2/11/16 - EF 60%    3. EKG  2/8/16 - NSR    4. Cholesterol  2/10/16 - , HDL 52, ,   3/7/19- , HDL 48, ,     5. TSH   10/10/15 - normal    6. Cardiolite  3/21-22/16 No Ischemia    7.  Loop recorder  (2/20/16-3/20/16): 52->78 BPM with one episode of tachycardia to 151         LABORATORY DATA            Lab Results   Component Value Date/Time    WBC 5.3 03/23/2015 09:28 AM    Hemoglobin (POC) 14.6 02/08/2016 03:52 PM    HGB 13.8 12/19/2017 10:08 AM    Hematocrit (POC) 43 02/08/2016 03:52 PM    HCT 42.2 12/19/2017 10:08 AM    PLATELET 792 32/44/7114 09:28 AM    MCV 85.9 03/23/2015 09:28 AM      Lab Results   Component Value Date/Time    Sodium 143 12/19/2017 10:08 AM Potassium 4.9 12/19/2017 10:08 AM    Chloride 102 12/19/2017 10:08 AM    CO2 23 12/19/2017 10:08 AM    Anion gap 14 03/23/2015 09:28 AM    Glucose 91 12/19/2017 10:08 AM    BUN 26 12/19/2017 10:08 AM    Creatinine 0.92 12/19/2017 10:08 AM    BUN/Creatinine ratio 28 12/19/2017 10:08 AM    GFR est AA 70 12/19/2017 10:08 AM    GFR est non-AA 61 12/19/2017 10:08 AM    Calcium 9.3 12/19/2017 10:08 AM    Bilirubin, total 0.6 06/06/2016 09:57 AM    AST (SGOT) 22 06/06/2016 09:57 AM    Alk. phosphatase 65 06/06/2016 09:57 AM    Protein, total 7.2 06/06/2016 09:57 AM    Albumin 4.5 06/06/2016 09:57 AM    Globulin 4.0 03/23/2015 09:28 AM    A-G Ratio 1.6 10/26/2015 03:44 PM    ALT (SGPT) 29 12/19/2017 10:08 AM           ASSESSMENT/RECOMMENDATIONS:.      1. HTN  - Blood pressure elevated today with leg swelling.   - Stop Norvasc and start Bystolic 5mg/d  - Instructed on low sodium diet. 2. Palpitations  - They have reoccurred, mostly at night  - If they get worse, favor moving forward with a     3. Dyslipidemia  - if she develops muscle aching, will get a lap slip for CPK. 4. LE edema  - Will do venous dopplers  - Heart and lungs sound good. Return in 6 months or PRN    Orders Placed This Encounter    HEPATIC FUNCTION PANEL     Standing Status:   Future     Standing Expiration Date:   8/1/2020    LIPID PANEL    cpap machine kit     Sig: by Does Not Apply route.  pantoprazole (PROTONIX) 40 mg tablet     Sig: Take 40 mg by mouth nightly.  omeprazole (PRILOSEC) 40 mg capsule     Sig: Take 40 mg by mouth two (2) times a day.  rosuvastatin (CRESTOR) 5 mg tablet     Sig: Take 1 Tab by mouth nightly. Dispense:  30 Tab     Refill:  5    nebivolol (BYSTOLIC) 2.5 mg tablet     Sig: Take 2 Tabs by mouth daily. Dispense:  30 Tab     Refill:  4          Follow-up and Dispositions  ·   Return in about 6 months (around 2/1/2020).            I have discussed the diagnosis with  Monica Mitchell and the intended plan as seen in the above orders. Questions were answered concerning future plans. I have discussed medication side effects and warnings with the patient as well. Thank you,  Hugo Colyb MD for involving me in the care of  [facility]. Please do not hesitate to contact me for further questions/concerns. Written by Judy Knott, as dictated by Jignesh Yip MD.       Lonnie Antoine. Elli Kramer MD, [facility] [place]    Patient Care Team:  Hugo Colby MD as PCP - General (Family Practice)  Crescencio Martin MD as Physician (Cardiology)  Niraj Vee RN as Nurse [address]      [phone] / [phone] Fax

## 2019-08-01 NOTE — PROGRESS NOTES
Extended / Orthostatic Vitals:  Patient Position 2: Supine  BP 2: 156/82  Pulse 2: 64  Patient Position 3: Standing  BP 3: 158/88  Pulse 3: 80    New patient referred for dizziness and LE swelling.

## 2019-08-21 RX ORDER — NEBIVOLOL 5 MG/1
TABLET ORAL DAILY
COMMUNITY
End: 2019-08-21 | Stop reason: SDUPTHER

## 2019-08-21 RX ORDER — NEBIVOLOL 5 MG/1
5 TABLET ORAL DAILY
Qty: 30 TAB | Refills: 1 | Status: SHIPPED | OUTPATIENT
Start: 2019-08-21 | End: 2019-09-21 | Stop reason: SDUPTHER

## 2019-08-21 NOTE — TELEPHONE ENCOUNTER
Refill is per verbal order of Dr. Tho Beltrán.    Requested Prescriptions     Pending Prescriptions Disp Refills    nebivolol (BYSTOLIC) 5 mg tablet 30 Tab 1     Sig: Take 1 Tab by mouth daily.

## 2019-09-11 DIAGNOSIS — E78.5 DYSLIPIDEMIA: Primary | ICD-10-CM

## 2019-09-11 LAB
ALBUMIN SERPL-MCNC: 4.4 G/DL (ref 3.5–4.8)
ALP SERPL-CCNC: 75 IU/L (ref 39–117)
ALT SERPL-CCNC: 14 IU/L (ref 0–32)
AST SERPL-CCNC: 15 IU/L (ref 0–40)
BILIRUB DIRECT SERPL-MCNC: 0.14 MG/DL (ref 0–0.4)
BILIRUB SERPL-MCNC: 0.5 MG/DL (ref 0–1.2)
CHOLEST SERPL-MCNC: 143 MG/DL (ref 100–199)
HDLC SERPL-MCNC: 47 MG/DL
INTERPRETATION, 910389: NORMAL
LDLC SERPL CALC-MCNC: 75 MG/DL (ref 0–99)
PROT SERPL-MCNC: 6.9 G/DL (ref 6–8.5)
TRIGL SERPL-MCNC: 106 MG/DL (ref 0–149)
VLDLC SERPL CALC-MCNC: 21 MG/DL (ref 5–40)

## 2019-09-23 RX ORDER — NEBIVOLOL HYDROCHLORIDE 5 MG/1
TABLET ORAL
Qty: 30 TAB | Refills: 5 | Status: SHIPPED | OUTPATIENT
Start: 2019-09-23 | End: 2019-10-15 | Stop reason: SDUPTHER

## 2019-10-15 ENCOUNTER — OFFICE VISIT (OUTPATIENT)
Dept: CARDIOLOGY CLINIC | Age: 79
End: 2019-10-15

## 2019-10-15 VITALS
HEART RATE: 60 BPM | BODY MASS INDEX: 41.59 KG/M2 | HEIGHT: 62 IN | DIASTOLIC BLOOD PRESSURE: 70 MMHG | SYSTOLIC BLOOD PRESSURE: 160 MMHG | WEIGHT: 226 LBS

## 2019-10-15 DIAGNOSIS — I73.9 PERIPHERAL VASCULAR DISEASE (HCC): ICD-10-CM

## 2019-10-15 DIAGNOSIS — I10 ESSENTIAL HYPERTENSION: ICD-10-CM

## 2019-10-15 DIAGNOSIS — I10 ESSENTIAL HYPERTENSION WITH GOAL BLOOD PRESSURE LESS THAN 140/90: ICD-10-CM

## 2019-10-15 DIAGNOSIS — I10 ESSENTIAL HYPERTENSION WITH GOAL BLOOD PRESSURE LESS THAN 140/90: Primary | ICD-10-CM

## 2019-10-15 DIAGNOSIS — E78.9 LIPID DISORDER: ICD-10-CM

## 2019-10-15 RX ORDER — NEBIVOLOL 5 MG/1
TABLET ORAL
Qty: 90 TAB | Refills: 3 | Status: SHIPPED | OUTPATIENT
Start: 2019-10-15 | End: 2019-11-06 | Stop reason: DRUGHIGH

## 2019-10-15 RX ORDER — LOSARTAN POTASSIUM 100 MG/1
TABLET ORAL
Qty: 90 TAB | Refills: 3 | Status: SHIPPED | OUTPATIENT
Start: 2019-10-15 | End: 2020-03-25 | Stop reason: SDUPTHER

## 2019-10-15 NOTE — PROGRESS NOTES
LAST OFFICE VISIT : Visit date not found      No diagnosis found. Yelitza Velasquez is a 70 y.o. female with dizziness, edema, dyslipidemia, and HTN referred for follow up. Cardiac risk factors: family history, dyslipidemia, hypertension, post-menopausal  I have personally obtained the history from the patient. HISTORY OF PRESENTING ILLNESS     Overall the pt states she is doing well. Pt states she is still experiencing some LE edema. Pt states that in the morning she has very little edema but if she walks a lot she will have more swelling. Pt states that she gets calf pain when she walks and when she lays down at night. Pt states she ordered compression hose. Pt states she occasionally has palpitations, but notes that if she coughs they go away. Pt states that the pharmacy only gave her 10 tablets of losartan, and she ran out of it yesterday, so she has not taken losartan today. The patient denies chest pain/ shortness of breath, orthopnea, PND, syncope, presyncope or fatigue.          ACTIVE PROBLEM LIST     Patient Active Problem List    Diagnosis Date Noted    DDD (degenerative disc disease), lumbar 05/08/2018    Obesity, morbid ([address] 12/19/2017    FH: breast cancer in first degree relative 12/19/2017    Allergic to tetanus vaccine 08/09/2016    Hypercalcemia 02/09/2016    Anxiety 04/28/2015    Urinary incontinence 11/18/2014    Advance directive discussed with patient 10/07/2014    Health care maintenance 10/07/2014    Osteoporosis 10/15/2012    Lipid disorder 10/10/2012    Knee pain, right 10/09/2012    Osteoarthritis 10/09/2012    Hyperglycemia 03/15/2012    Chest pain 03/17/2011    History of normal resting EKG 03/17/2011    Colon polyp 03/17/2011    S/P JESSICA-BSO 03/17/2011    Essential hypertension with goal blood pressure less than 140/90 03/17/2011    Pseudogout 07/15/2010    Depression 07/15/2010    GERD (gastroesophageal reflux disease) 07/15/2010    ALESIA (obstructive sleep apnea) 07/15/2010           PAST MEDICAL HISTORY     Past Medical History:   Diagnosis Date    Current smoker     4 cig pd    Depression 7/15/2010    GERD (gastroesophageal reflux disease) 7/15/2010    High cholesterol 7/15/2010    HTN (hypertension)     Obesity (BMI 30-39. 9)     ALESIA (obstructive sleep apnea) 7/15/2010    Osteopenia 7/15/2010    Pseudogout 7/15/2010           PAST SURGICAL HISTORY     Past Surgical History:   Procedure Laterality Date    ENDOSCOPY, COLON, DIAGNOSTIC      Dr. Benito Stewart HX GYN  late     JESSICA, one ovary intact, due to bleeding    HX ORTHOPAEDIC      right wrist and neck    HX UROLOGICAL      Bladder sling - Dr. Luque Alert     Allergies   Allergen Reactions    Bactrim [Sulfamethoprim] Swelling    Lipitor [Atorvastatin] Other (comments)     Aches      Roxicodone [Oxycodone] Swelling    Tetanus-Diphtheria Toxoids-Td Swelling     Allergic to Td?     Welchol [Colesevelam] Other (comments)     Aches            FAMILY HISTORY     Family History   Problem Relation Age of Onset    Diabetes Father     Arthritis-rheumatoid Father     Heart Disease Father     Cancer Maternal Aunt     Breast Cancer Maternal Aunt     Cancer Other     Breast Cancer Sister     Breast Cancer Maternal Aunt     negative for cardiac disease       SOCIAL HISTORY     Social History     Socioeconomic History    Marital status: SINGLE     Spouse name: Not on file    Number of children: Not on file    Years of education: Not on file    Highest education level: Not on file   Tobacco Use    Smoking status: Former Smoker     Packs/day: 0.50     Last attempt to quit: 2010     Years since quittin.1    Smokeless tobacco: Never Used   Substance and Sexual Activity    Alcohol use: No     Alcohol/week: 0.8 standard drinks     Types: 1 Glasses of wine per week     Comment: RARE    Drug use: No    Sexual activity: Never         MEDICATIONS     Current Outpatient Medications   Medication Sig    BYSTOLIC 5 mg tablet TAKE 1 TABLET BY MOUTH EVERY DAY    DULoxetine (CYMBALTA) 30 mg capsule Take 1 Cap by mouth daily.  ALPRAZolam (XANAX) 0.5 mg tablet TAKE 1 TABLET BY MOUTH 3 TIMES A DAY AS NEEDED    fluticasone (FLONASE) 50 mcg/actuation nasal spray INHALE 2 PUFFS INTO EACH NOSTRIL DAILY    aspirin 81 mg chewable tablet Take 1 Tab by mouth daily.  cpap machine kit by Does Not Apply route.  pantoprazole (PROTONIX) 40 mg tablet Take 40 mg by mouth nightly.  omeprazole (PRILOSEC) 40 mg capsule Take 40 mg by mouth two (2) times a day.  rosuvastatin (CRESTOR) 5 mg tablet Take 1 Tab by mouth nightly.  raNITIdine (ZANTAC) 300 mg tab Take 1 Tab by mouth daily.  bumetanide (BUMEX) 0.5 mg tablet Take  by mouth daily.  calcium carbonate/vitamin D3 (CALCIUM + D PO) Take 1,200 mg by mouth daily.  docusate sodium (DOK) 250 mg capsule Take 250 mg by mouth daily.  traMADol (ULTRAM) 50 mg tablet TAKE 1 TABLET BY MOUTH EVERY 6 HRS AS NEEDED FOR PAIN    losartan (COZAAR) 100 mg tablet TAKE 1 TABLET BY MOUTH DAILY.  cholecalciferol (VITAMIN D3) 1,000 unit cap Take 2,000 Units by mouth daily.  acetaminophen (TYLENOL ARTHRITIS PAIN) 650 mg CR tablet Take 650 mg by mouth four (4) times daily.  MULTIVITAMINS (MULTIPLE VITAMIN PO) Take  by mouth. No current facility-administered medications for this visit. I have reviewed the nurses notes, vitals, problem list, allergy list, medical history, family, social history and medications. REVIEW OF SYMPTOMS      General: Pt denies excessive weight gain or loss. Pt is able to conduct ADL's  HEENT: Denies blurred vision, headaches, hearing loss, epistaxis and difficulty swallowing. Respiratory: Denies cough, congestion, shortness of breath, PARRISH, wheezing or stridor.   Cardiovascular: Denies precordial pain, or PND Positive for edema and palpitations  Gastrointestinal: Denies poor appetite, indigestion, abdominal pain or blood in stool  Genitourinary: Denies hematuria, dysuria, increased urinary frequency  Musculoskeletal: Denies joint pain or swelling from muscles or joints  Neurologic: Denies tremor, paresthesias, headache, or sensory motor disturbance  Psychiatric: Denies confusion, insomnia, depression  Integumentray: Denies rash, itching or ulcers. Hematologic: Denies easy bruising, bleeding     PHYSICAL EXAMINATION      Vitals:    10/15/19 1559   BP: 160/70   Pulse: 60   Weight: 226 lb (102.5 kg)   Height: 5' 2\" (1.575 m)     General: Well developed, in no acute distress. HEENT: No jaundice, oral mucosa moist, no oral ulcers  Neck: Supple, no stiffness, no lymphadenopathy, supple  Heart:  Normal S1/S2 negative S3 or S4. Regular, no murmur, gallop or rub, no jugular venous distention  Respiratory: Clear bilaterally x 4, no wheezing or rales  Abdomen:   Soft, non-tender, bowel sounds are active. Extremities:  No edema, normal cap refill, no cyanosis. Musculoskeletal: No clubbing, no deformities  Neuro: A&Ox3, speech clear, gait stable, cooperative, no focal neurologic deficits  Skin: Skin color is normal. No rashes or lesions. Non diaphoretic, moist.  Vascular: 2+ pulses symmetric in all extremities         DIAGNOSTIC DATA     1. Stress Echo   8/11/09 - normal    2. Echo   2/11/16 - EF 60%    3. EKG  2/8/16 - NSR    4. Cholesterol  2/10/16 - , HDL 52, ,   3/7/19- , HDL 48, ,   9/10/19- , HDL 47, LDL 75,     5. TSH   10/10/15 - normal    6. Cardiolite  3/21-22/16 No Ischemia    7. Loop recorder  (2/20/16-3/20/16): 52->78 BPM with one episode of tachycardia to 151    8. LE Venous Doppler  8/1/19- No evidence of deep vein thrombosis or venous obstruction within the lower extremities bilaterally.          LABORATORY DATA            Lab Results   Component Value Date/Time    WBC 5.3 03/23/2015 09:28 AM    Hemoglobin (POC) 14.6 02/08/2016 03:52 PM HGB 13.8 12/19/2017 10:08 AM    Hematocrit (POC) 43 02/08/2016 03:52 PM    HCT 42.2 12/19/2017 10:08 AM    PLATELET 464 15/87/7149 09:28 AM    MCV 85.9 03/23/2015 09:28 AM      Lab Results   Component Value Date/Time    Sodium 143 12/19/2017 10:08 AM    Potassium 4.9 12/19/2017 10:08 AM    Chloride 102 12/19/2017 10:08 AM    CO2 23 12/19/2017 10:08 AM    Anion gap 14 03/23/2015 09:28 AM    Glucose 91 12/19/2017 10:08 AM    BUN 26 12/19/2017 10:08 AM    Creatinine 0.92 12/19/2017 10:08 AM    BUN/Creatinine ratio 28 12/19/2017 10:08 AM    GFR est AA 70 12/19/2017 10:08 AM    GFR est non-AA 61 12/19/2017 10:08 AM    Calcium 9.3 12/19/2017 10:08 AM    Bilirubin, total 0.5 09/10/2019 02:08 PM    AST (SGOT) 15 09/10/2019 02:08 PM    Alk. phosphatase 75 09/10/2019 02:08 PM    Protein, total 6.9 09/10/2019 02:08 PM    Albumin 4.4 09/10/2019 02:08 PM    Globulin 4.0 03/23/2015 09:28 AM    A-G Ratio 1.6 10/26/2015 03:44 PM    ALT (SGPT) 14 09/10/2019 02:08 PM           ASSESSMENT/RECOMMENDATIONS:.      1. HTN  - Blood pressure is elevated. Stopping the Norvasc, she states did not make a change in edema, though she has no pitting edema on physical exam. She is tolerating Bystolic, but somewhere along the way stopped taking the Cozaar. Will place her back on the Cozaar 100 mg. Will recheck her BP when she returns for her MC's. - Encouraged her to eat a low sodium diet. 2. Palpitations  - Infrequent  3. Dyslipidemia  - Lipids are at goal on current medical regimen. 4. LE edema  - Venous dopplers were negative, but now she has sx's of claudication, so I ordered MC's and instructed her to wear compression hose if she is on her feet for a long period of time. 5. Claudication sx's  - Will order MC's  6. Return in 6 months or PRN. No orders of the defined types were placed in this encounter. I have discussed the diagnosis with  Kip Lainez and the intended plan as seen in the above orders.   Questions were answered concerning future plans. I have discussed medication side effects and warnings with the patient as well. Thank you,  Ross Mcgrath MD for involving me in the care of  [facility]. Please do not hesitate to contact me for further questions/concerns. Written by Constanza Mayfield, as dictated by Aneta Trejo MD.     Daryle Martinet Jacky Anton, MD, [facility] [place]    Patient Care Team:  Ross Mcgrath MD as PCP - General (Family Practice)  Anant Andino MD as Physician (Cardiology)  Angel Mattson RN as Nurse [address]      [phone] / [phone] Fax

## 2019-10-15 NOTE — PROGRESS NOTES
Visit Vitals  /70   Pulse 60   Ht 5' 2\" (1.575 m)   Wt 226 lb (102.5 kg)   LMP 05/19/1989   BMI 41.34 kg/m²

## 2019-10-15 NOTE — LETTER
10/15/19 Patient: Breann Talbert YOB: 1940 Date of Visit: 10/15/2019 Janis Ward MD 
[address] [place] [address] [id number] VIA Facsimile: [phone] Dear Janis Ward MD, Thank you for referring Ms. Gabo Weller to [facility] for evaluation. My notes for this consultation are attached. If you have questions, please do not hesitate to call me. I look forward to following your patient along with you.  
 
 
Sincerely, 
 
Aneta Trejo MD

## 2019-10-31 ENCOUNTER — HOSPITAL ENCOUNTER (OUTPATIENT)
Dept: MAMMOGRAPHY | Age: 79
Discharge: HOME | End: 2019-10-31
Attending: FAMILY MEDICINE
Payer: MEDICARE

## 2019-10-31 DIAGNOSIS — Z12.31 VISIT FOR SCREENING MAMMOGRAM: ICD-10-CM

## 2019-10-31 PROCEDURE — 77063 BREAST TOMOSYNTHESIS BI: CPT

## 2019-11-06 ENCOUNTER — OFFICE VISIT (OUTPATIENT)
Dept: CARDIOLOGY CLINIC | Age: 79
End: 2019-11-06

## 2019-11-06 VITALS — BODY MASS INDEX: 41.77 KG/M2 | HEIGHT: 62 IN | WEIGHT: 227 LBS

## 2019-11-06 DIAGNOSIS — I10 ESSENTIAL HYPERTENSION WITH GOAL BLOOD PRESSURE LESS THAN 140/90: Primary | ICD-10-CM

## 2019-11-06 DIAGNOSIS — E78.9 LIPID DISORDER: ICD-10-CM

## 2019-11-06 RX ORDER — NEBIVOLOL 10 MG/1
10 TABLET ORAL DAILY
Qty: 30 TAB | Refills: 5 | Status: SHIPPED | OUTPATIENT
Start: 2019-11-06 | End: 2020-03-02 | Stop reason: ALTCHOICE

## 2019-11-06 RX ORDER — NEBIVOLOL 10 MG/1
TABLET ORAL DAILY
COMMUNITY
End: 2019-11-06 | Stop reason: SDUPTHER

## 2019-11-06 NOTE — LETTER
11/6/19 Patient: Abraham Alaniz YOB: 1940 Date of Visit: 11/6/2019 Bassam Martinez MD 
[address] [place] [address] [id number] VIA Facsimile: [phone] Dear Bassam Martinez MD, Thank you for referring Ms. Mile Blancas to [facility] for evaluation. My notes for this consultation are attached. If you have questions, please do not hesitate to call me. I look forward to following your patient along with you.  
 
 
Sincerely, 
 
Day Robertson MD

## 2019-11-06 NOTE — PROGRESS NOTES
LAST OFFICE VISIT : 10/15/19      No diagnosis found. Melody Quarles is a 70 y.o. female with dizziness, edema, dyslipidemia, and HTN referred for follow up. Cardiac risk factors: family history, dyslipidemia, hypertension, post-menopausal  I have personally obtained the history from the patient. HISTORY OF PRESENTING ILLNESS     Vannessa Pizarro reports she is doing well. She has leg pain when she walks. She is tolerating all of her antihypertensives. The patient denies chest pain/ shortness of breath, orthopnea, PND, syncope, presyncope or fatigue. ACTIVE PROBLEM LIST     Patient Active Problem List    Diagnosis Date Noted    Peripheral vascular disease ([facility] 10/15/2019    DDD (degenerative disc disease), lumbar 05/08/2018    Obesity, morbid ([facility] 12/19/2017    FH: breast cancer in first degree relative 12/19/2017    Allergic to tetanus vaccine 08/09/2016    Hypercalcemia 02/09/2016    Anxiety 04/28/2015    Urinary incontinence 11/18/2014    Advance directive discussed with patient 10/07/2014    Health care maintenance 10/07/2014    Osteoporosis 10/15/2012    Lipid disorder 10/10/2012    Knee pain, right 10/09/2012    Osteoarthritis 10/09/2012    Hyperglycemia 03/15/2012    Chest pain 03/17/2011    History of normal resting EKG 03/17/2011    Colon polyp 03/17/2011    S/P JESSICA-BSO 03/17/2011    Essential hypertension with goal blood pressure less than 140/90 03/17/2011    Pseudogout 07/15/2010    Depression 07/15/2010    GERD (gastroesophageal reflux disease) 07/15/2010    ALESIA (obstructive sleep apnea) 07/15/2010           PAST MEDICAL HISTORY     Past Medical History:   Diagnosis Date    Current smoker     4 cig pd    Depression 7/15/2010    GERD (gastroesophageal reflux disease) 7/15/2010    High cholesterol 7/15/2010    HTN (hypertension)     Obesity (BMI 30-39. 9)     ALESIA (obstructive sleep apnea) 7/15/2010    Osteopenia 7/15/2010    Pseudogout 7/15/2010           PAST SURGICAL HISTORY     Past Surgical History:   Procedure Laterality Date    ENDOSCOPY, COLON, DIAGNOSTIC      Dr. Vesta Ruth HX GYN  late 19's    JESSICA, one ovary intact, due to bleeding    HX ORTHOPAEDIC      right wrist and neck    HX UROLOGICAL  2011    Bladder sling - Dr. Maria Esther Nascimento     Allergies   Allergen Reactions    Bactrim [Sulfamethoprim] Swelling    Lipitor [Atorvastatin] Other (comments)     Aches      Roxicodone [Oxycodone] Swelling    Tetanus-Diphtheria Toxoids-Td Swelling     Allergic to Td?  Welchol [Colesevelam] Other (comments)     Aches            FAMILY HISTORY     Family History   Problem Relation Age of Onset    Diabetes Father     Arthritis-rheumatoid Father     Heart Disease Father     Cancer Maternal Aunt     Breast Cancer Maternal Aunt     Cancer Other     Breast Cancer Sister 58    Breast Cancer Maternal Aunt     negative for cardiac disease       SOCIAL HISTORY     Social History     Socioeconomic History    Marital status: SINGLE     Spouse name: Not on file    Number of children: Not on file    Years of education: Not on file    Highest education level: Not on file   Tobacco Use    Smoking status: Former Smoker     Packs/day: 0.50     Last attempt to quit: 2010     Years since quittin.1    Smokeless tobacco: Never Used   Substance and Sexual Activity    Alcohol use: No     Alcohol/week: 0.8 standard drinks     Types: 1 Glasses of wine per week     Comment: RARE    Drug use: No    Sexual activity: Never         MEDICATIONS     Current Outpatient Medications   Medication Sig    losartan (COZAAR) 100 mg tablet TAKE 1 TABLET BY MOUTH DAILY.  rosuvastatin (CRESTOR) 5 mg tablet Take 1 Tab by mouth nightly.  raNITIdine (ZANTAC) 300 mg tab Take 1 Tab by mouth daily.  DULoxetine (CYMBALTA) 30 mg capsule Take 1 Cap by mouth daily.     traMADol (ULTRAM) 50 mg tablet TAKE 1 TABLET BY MOUTH EVERY 6 HRS AS NEEDED FOR PAIN    ALPRAZolam (XANAX) 0.5 mg tablet TAKE 1 TABLET BY MOUTH 3 TIMES A DAY AS NEEDED    fluticasone (FLONASE) 50 mcg/actuation nasal spray INHALE 2 PUFFS INTO EACH NOSTRIL DAILY    aspirin 81 mg chewable tablet Take 1 Tab by mouth daily.  nebivolol (BYSTOLIC) 10 mg tablet Take 1 Tab by mouth daily.  cpap machine kit by Does Not Apply route.  pantoprazole (PROTONIX) 40 mg tablet Take 40 mg by mouth nightly.  omeprazole (PRILOSEC) 40 mg capsule Take 40 mg by mouth two (2) times a day.  bumetanide (BUMEX) 0.5 mg tablet Take  by mouth daily.  calcium carbonate/vitamin D3 (CALCIUM + D PO) Take 1,200 mg by mouth daily.  docusate sodium (DOK) 250 mg capsule Take 250 mg by mouth daily.  cholecalciferol (VITAMIN D3) 1,000 unit cap Take 2,000 Units by mouth daily.  acetaminophen (TYLENOL ARTHRITIS PAIN) 650 mg CR tablet Take 650 mg by mouth four (4) times daily.  MULTIVITAMINS (MULTIPLE VITAMIN PO) Take  by mouth. No current facility-administered medications for this visit. I have reviewed the nurses notes, vitals, problem list, allergy list, medical history, family, social history and medications. REVIEW OF SYMPTOMS      General: Pt denies excessive weight gain or loss. Pt is able to conduct ADL's  HEENT: Denies blurred vision, headaches, hearing loss, epistaxis and difficulty swallowing. Respiratory: Denies cough, congestion, shortness of breath, PARRISH, wheezing or stridor.   Cardiovascular: Denies precordial pain, or PND  +palpitations  Gastrointestinal: Denies poor appetite, indigestion, abdominal pain or blood in stool  Genitourinary: Denies hematuria, dysuria, increased urinary frequency  Musculoskeletal: Denies joint pain or swelling from muscles or joints  +bialteral leg pain  Neurologic: Denies tremor, paresthesias, headache, or sensory motor disturbance  Psychiatric: Denies confusion, insomnia, depression  Integumentray: Denies rash, itching or ulcers. Hematologic: Denies easy bruising, bleeding     PHYSICAL EXAMINATION      Vitals:    11/06/19 1345   Weight: 227 lb (103 kg)   Height: 5' 2\" (1.575 m)     General: Well developed, in no acute distress. HEENT: No jaundice, oral mucosa moist, no oral ulcers  Neck: Supple, no stiffness, no lymphadenopathy, supple  Heart:  Normal S1/S2 negative S3 or S4. Regular, no murmur, gallop or rub, no jugular venous distention  Respiratory: Clear bilaterally x 4, no wheezing or rales  Abdomen:   Soft, non-tender, bowel sounds are active. Extremities:  No edema, normal cap refill, no cyanosis. Musculoskeletal: No clubbing, no deformities  Neuro: A&Ox3, speech clear, gait stable, cooperative, no focal neurologic deficits  Skin: Skin color is normal. No rashes or lesions. Non diaphoretic, moist.  Vascular: 2+ pulses symmetric in all extremities           DIAGNOSTIC DATA     1. Stress Echo   8/11/09 - normal    2. Echo   2/11/16 - EF 60%    3. EKG  2/8/16 - NSR    4. Cholesterol  2/10/16 - , HDL 52, ,   3/7/19- , HDL 48, ,   9/10/19- , HDL 47, LDL 75,     5. TSH   10/10/15 - normal    6. Cardiolite  3/21-22/16 No Ischemia    7. Loop recorder  (2/20/16-3/20/16): 52->78 BPM with one episode of tachycardia to 151    8. LE Venous Doppler  8/1/19- No evidence of deep vein thrombosis or venous obstruction within the lower extremities bilaterally.          LABORATORY DATA            Lab Results   Component Value Date/Time    WBC 5.3 03/23/2015 09:28 AM    Hemoglobin (POC) 14.6 02/08/2016 03:52 PM    HGB 13.8 12/19/2017 10:08 AM    Hematocrit (POC) 43 02/08/2016 03:52 PM    HCT 42.2 12/19/2017 10:08 AM    PLATELET 177 54/47/7038 09:28 AM    MCV 85.9 03/23/2015 09:28 AM      Lab Results   Component Value Date/Time    Sodium 143 12/19/2017 10:08 AM    Potassium 4.9 12/19/2017 10:08 AM    Chloride 102 12/19/2017 10:08 AM    CO2 23 12/19/2017 10:08 AM    Anion gap 14 03/23/2015 09:28 AM    Glucose 91 12/19/2017 10:08 AM    BUN 26 12/19/2017 10:08 AM    Creatinine 0.92 12/19/2017 10:08 AM    BUN/Creatinine ratio 28 12/19/2017 10:08 AM    GFR est AA 70 12/19/2017 10:08 AM    GFR est non-AA 61 12/19/2017 10:08 AM    Calcium 9.3 12/19/2017 10:08 AM    Bilirubin, total 0.5 09/10/2019 02:08 PM    AST (SGOT) 15 09/10/2019 02:08 PM    Alk. phosphatase 75 09/10/2019 02:08 PM    Protein, total 6.9 09/10/2019 02:08 PM    Albumin 4.4 09/10/2019 02:08 PM    Globulin 4.0 03/23/2015 09:28 AM    A-G Ratio 1.6 10/26/2015 03:44 PM    ALT (SGPT) 14 09/10/2019 02:08 PM           ASSESSMENT/RECOMMENDATIONS:.      1. HTN  - BP is still elevated today. - Will increase Bystolic to 32TD/G.  - Recheck BP in 2 weeks. - Encouraged her to eat a low sodium diet. 2. Palpitations  - Infrequent, no complaints today. 3. Dyslipidemia  - Lipids are at goal on current medical regimen. 4. Claudication sx's  - MC's ordered today that revealed significant vascular disease, with moderate reduction in MC post exercise. R= 0.68, L= 0.76    F/u as scheduled. No orders of the defined types were placed in this encounter. I have discussed the diagnosis with  Areli Orellana and the intended plan as seen in the above orders. Questions were answered concerning future plans. I have discussed medication side effects and warnings with the patient as well. Thank you,  Isela Melgoza MD for involving me in the care of  Areli Orellana. Please do not hesitate to contact me for further questions/concerns. Written by Cecilia Bliss, as dictated by Gurpreet Aguilera MD.      Adeilne Tate. Jensen Javed MD, [facility]    Patient Care Team:  Isela Melgoza MD as PCP - General (Family Practice)  Nik Avila MD as Physician (Cardiology)  Yessenia Quinn RN as Nurse [address], [address]     [address] [id number] [phone] / [phone] Fax

## 2019-11-06 NOTE — PROGRESS NOTES
Visit Vitals  Ht 5' 2\" (1.575 m)   Wt 227 lb (103 kg)   LMP 05/19/1989   BMI 41.52 kg/m²     Extended / Orthostatic Vitals:  Patient Position 2: Sitting  BP 2: 160/80  Pulse 2: 64  Patient Position 3: Standing  BP 3: 160/80  Pulse 3: 72

## 2019-11-08 ENCOUNTER — DOCUMENTATION ONLY (OUTPATIENT)
Dept: CARDIOLOGY CLINIC | Age: 79
End: 2019-11-08

## 2020-03-02 ENCOUNTER — TELEPHONE (OUTPATIENT)
Dept: CARDIOLOGY CLINIC | Age: 80
End: 2020-03-02

## 2020-03-02 RX ORDER — METOPROLOL SUCCINATE 25 MG/1
TABLET, EXTENDED RELEASE ORAL DAILY
COMMUNITY
End: 2020-03-02 | Stop reason: SDUPTHER

## 2020-03-02 RX ORDER — METOPROLOL SUCCINATE 25 MG/1
25 TABLET, EXTENDED RELEASE ORAL DAILY
Qty: 30 TAB | Refills: 3 | Status: SHIPPED | OUTPATIENT
Start: 2020-03-02 | End: 2020-03-25 | Stop reason: SDUPTHER

## 2020-03-02 NOTE — TELEPHONE ENCOUNTER
Patient would like to know if our office has any samples of carvedilol available. Please advise.      Phone: [phone]

## 2020-03-02 NOTE — TELEPHONE ENCOUNTER
Nita Hughes MD  You 1 hour ago (1:41 PM)      Metoprolol xl 25 mg at night and check in 2-3 weeks bp    Routing comment

## 2020-03-02 NOTE — TELEPHONE ENCOUNTER
Pt takes Bystolic 10 mg daily - due to cost she wanted to know if she could use Coreg or Metoprolol to replace it.

## 2020-03-11 DIAGNOSIS — E78.5 DYSLIPIDEMIA: ICD-10-CM

## 2020-03-25 ENCOUNTER — TELEPHONE (OUTPATIENT)
Dept: CARDIOLOGY CLINIC | Age: 80
End: 2020-03-25

## 2020-03-25 DIAGNOSIS — I10 ESSENTIAL HYPERTENSION WITH GOAL BLOOD PRESSURE LESS THAN 140/90: ICD-10-CM

## 2020-03-25 DIAGNOSIS — I10 ESSENTIAL HYPERTENSION: ICD-10-CM

## 2020-03-25 RX ORDER — LOSARTAN POTASSIUM 100 MG/1
TABLET ORAL
Qty: 90 TAB | Refills: 3 | Status: SHIPPED | OUTPATIENT
Start: 2020-03-25

## 2020-03-25 RX ORDER — METOPROLOL SUCCINATE 25 MG/1
25 TABLET, EXTENDED RELEASE ORAL DAILY
Qty: 90 TAB | Refills: 3 | Status: SHIPPED | OUTPATIENT
Start: 2020-03-25 | End: 2021-03-30

## 2020-03-25 NOTE — TELEPHONE ENCOUNTER
Per VO of Dr. Kit Royal: 11/19/2019    Future Appointments   Date Time Provider Marleny Earlene   5/5/2020  2:20 PM Gustavo Singh MD [facility]       Requested Prescriptions     Signed Prescriptions Disp Refills    losartan (COZAAR) 100 mg tablet 90 Tab 3     Sig: TAKE 1 TABLET BY MOUTH DAILY. Authorizing Provider: Ananda Coleman     Ordering User: Юлия Smith metoprolol succinate (TOPROL-XL) 25 mg XL tablet 90 Tab 3     Sig: Take 1 Tab by mouth daily.      Authorizing Provider: Ananda Coleman     Ordering User: Henri Juarez

## 2021-01-14 ENCOUNTER — TRANSCRIBE ORDER (OUTPATIENT)
Dept: SCHEDULING | Age: 81
End: 2021-01-14

## 2021-01-14 DIAGNOSIS — M51.36 DDD (DEGENERATIVE DISC DISEASE), LUMBAR: ICD-10-CM

## 2021-01-14 DIAGNOSIS — M54.16 LEFT LUMBAR RADICULOPATHY: Primary | ICD-10-CM

## 2021-01-23 ENCOUNTER — HOSPITAL ENCOUNTER (OUTPATIENT)
Dept: MRI IMAGING | Age: 81
Discharge: HOME | End: 2021-01-23
Attending: FAMILY MEDICINE
Payer: MEDICARE

## 2021-01-23 DIAGNOSIS — M54.16 LEFT LUMBAR RADICULOPATHY: ICD-10-CM

## 2021-01-23 DIAGNOSIS — M51.36 DDD (DEGENERATIVE DISC DISEASE), LUMBAR: ICD-10-CM

## 2021-01-23 PROCEDURE — 72148 MRI LUMBAR SPINE W/O DYE: CPT

## 2021-10-11 ENCOUNTER — TRANSCRIBE ORDER (OUTPATIENT)
Dept: SCHEDULING | Age: 81
End: 2021-10-11

## 2021-10-11 DIAGNOSIS — Z12.31 VISIT FOR SCREENING MAMMOGRAM: Primary | ICD-10-CM

## 2022-03-19 PROBLEM — Z80.3 FH: BREAST CANCER IN FIRST DEGREE RELATIVE: Status: ACTIVE | Noted: 2017-12-19

## 2022-03-19 PROBLEM — E66.01 OBESITY, MORBID (HCC): Status: ACTIVE | Noted: 2017-12-19

## 2022-03-19 PROBLEM — I73.9 PERIPHERAL VASCULAR DISEASE (HCC): Status: ACTIVE | Noted: 2019-10-15

## 2022-03-20 PROBLEM — M51.36 DDD (DEGENERATIVE DISC DISEASE), LUMBAR: Status: ACTIVE | Noted: 2018-05-08

## 2022-03-20 PROBLEM — M51.369 DDD (DEGENERATIVE DISC DISEASE), LUMBAR: Status: ACTIVE | Noted: 2018-05-08

## 2023-01-10 ENCOUNTER — OFFICE VISIT (OUTPATIENT)
Dept: CARDIOLOGY CLINIC | Age: 83
End: 2023-01-10
Payer: MEDICARE

## 2023-01-10 VITALS
BODY MASS INDEX: 37.17 KG/M2 | DIASTOLIC BLOOD PRESSURE: 84 MMHG | HEIGHT: 62 IN | HEART RATE: 69 BPM | SYSTOLIC BLOOD PRESSURE: 138 MMHG | WEIGHT: 202 LBS | OXYGEN SATURATION: 95 %

## 2023-01-10 DIAGNOSIS — E78.9 LIPID DISORDER: ICD-10-CM

## 2023-01-10 DIAGNOSIS — I10 ESSENTIAL HYPERTENSION WITH GOAL BLOOD PRESSURE LESS THAN 140/90: Primary | ICD-10-CM

## 2023-01-10 DIAGNOSIS — G47.33 OSA (OBSTRUCTIVE SLEEP APNEA): ICD-10-CM

## 2023-01-10 PROCEDURE — 3079F DIAST BP 80-89 MM HG: CPT | Performed by: SPECIALIST

## 2023-01-10 PROCEDURE — G8417 CALC BMI ABV UP PARAM F/U: HCPCS | Performed by: SPECIALIST

## 2023-01-10 PROCEDURE — 1123F ACP DISCUSS/DSCN MKR DOCD: CPT | Performed by: SPECIALIST

## 2023-01-10 PROCEDURE — G8427 DOCREV CUR MEDS BY ELIG CLIN: HCPCS | Performed by: SPECIALIST

## 2023-01-10 PROCEDURE — 1101F PT FALLS ASSESS-DOCD LE1/YR: CPT | Performed by: SPECIALIST

## 2023-01-10 PROCEDURE — 1090F PRES/ABSN URINE INCON ASSESS: CPT | Performed by: SPECIALIST

## 2023-01-10 PROCEDURE — 99204 OFFICE O/P NEW MOD 45 MIN: CPT | Performed by: SPECIALIST

## 2023-01-10 PROCEDURE — 3075F SYST BP GE 130 - 139MM HG: CPT | Performed by: SPECIALIST

## 2023-01-10 PROCEDURE — G9717 DOC PT DX DEP/BP F/U NT REQ: HCPCS | Performed by: SPECIALIST

## 2023-01-10 PROCEDURE — G8536 NO DOC ELDER MAL SCRN: HCPCS | Performed by: SPECIALIST

## 2023-01-10 PROCEDURE — 93000 ELECTROCARDIOGRAM COMPLETE: CPT | Performed by: SPECIALIST

## 2023-01-10 RX ORDER — AMLODIPINE BESYLATE 5 MG/1
5 TABLET ORAL DAILY
COMMUNITY

## 2023-01-10 RX ORDER — CHOLECALCIFEROL (VITAMIN D3) 50 MCG
CAPSULE ORAL
COMMUNITY

## 2023-01-10 NOTE — LETTER
1/10/2023    Patient: Alexander Estevez   YOB: 1940   Date of Visit: 1/10/2023     Alessandro Munguia, [address] [id number]  Via Fax: [phone]    Dear Alessandro Munguia MD,      Thank you for referring Ms. Tung Myles to [address] [place] [address] for evaluation. My notes for this consultation are attached. If you have questions, please do not hesitate to call me. I look forward to following your patient along with you.       Sincerely,    Marcio Willson MD

## 2023-01-10 NOTE — PROGRESS NOTES
CARDIOLOGY OFFICE NOTE    Hubert Vega MD, [address], [place], [address] [facility]  Phone [phone]; Fax [phone]  Mobile [phone]   Voice Mail [phone]            ICD-10-CM ICD-9-CM   1. Essential hypertension with goal blood pressure less than 140/90  I10 401.9   2. Lipid disorder  E78.9 272.9   3. ALESIA (obstructive sleep apnea)  G47.33 327.23            Ishmael Shelby is a [de-identified] y.o. female with dizziness, edema, dyslipidemia, and HTN referred for follow up. Cardiac risk factors: family history, dyslipidemia, hypertension, post-menopausal  I have personally obtained the history from the patient. HISTORY OF PRESENTING ILLNESS     Vannessa Kowalski reports she is doing well. She is having leg. She will walk 1/2 block with pain in calves. The pain duration is relieved with sitting. She also states she has been having some shortness of breath but no chest pain. I saw her in 2019 discussed with her nursing Dr. Wood Bryant she cannot recall this discussion.          ACTIVE PROBLEM LIST     Patient Active Problem List    Diagnosis Date Noted    Peripheral vascular disease ([facility] 10/15/2019    DDD (degenerative disc disease), lumbar 05/08/2018    Obesity, morbid ([address] 12/19/2017    FH: breast cancer in first degree relative 12/19/2017    Allergic to tetanus vaccine 08/09/2016    Hypercalcemia 02/09/2016    Anxiety 04/28/2015    Urinary incontinence 11/18/2014    Advance directive discussed with patient 10/07/2014    Health care maintenance 10/07/2014    Osteoporosis 10/15/2012    Lipid disorder 10/10/2012    Knee pain, right 10/09/2012    Osteoarthritis 10/09/2012    Hyperglycemia 03/15/2012    Chest pain 03/17/2011    History of normal resting EKG 03/17/2011    Colon polyp 03/17/2011    S/P JESSICA-BSO 03/17/2011    Essential hypertension with goal blood pressure less than 140/90 03/17/2011    Pseudogout 07/15/2010    Depression 07/15/2010    GERD (gastroesophageal reflux disease) 07/15/2010    ALESIA (obstructive sleep apnea) 07/15/2010           PAST MEDICAL HISTORY     Past Medical History:   Diagnosis Date    Current smoker     4 cig pd    Depression 7/15/2010    GERD (gastroesophageal reflux disease) 7/15/2010    High cholesterol 7/15/2010    HTN (hypertension)     Obesity (BMI 30-39.9)     ALESIA (obstructive sleep apnea) 7/15/2010    Osteopenia 7/15/2010    Pseudogout 7/15/2010           PAST SURGICAL HISTORY     Past Surgical History:   Procedure Laterality Date    ENDOSCOPY, COLON, DIAGNOSTIC      Dr. Daniel Tolentino GYN  late     JESSICA, one ovary intact, due to bleeding    HX ORTHOPAEDIC      right wrist and neck    HX UROLOGICAL      Bladder sling - Dr. Jane Powell     Allergies   Allergen Reactions    Bactrim [Sulfamethoprim] Swelling    Lipitor [Atorvastatin] Other (comments)     Aches      Roxicodone [Oxycodone] Swelling    Tetanus-Diphtheria Toxoids-Td Swelling     Allergic to Td? Welchol [Colesevelam] Other (comments)     Aches            FAMILY HISTORY     Family History   Problem Relation Age of Onset    Diabetes Father     Arthritis-rheumatoid Father     Heart Disease Father     Cancer Maternal Aunt     Breast Cancer Maternal Aunt     Cancer Other     Breast Cancer Sister 58    Breast Cancer Maternal Aunt     negative for cardiac disease       SOCIAL HISTORY     Social History     Socioeconomic History    Marital status: SINGLE   Tobacco Use    Smoking status: Former     Packs/day: 0.50     Types: Cigarettes     Quit date: 2010     Years since quittin.3    Smokeless tobacco: Never   Substance and Sexual Activity    Alcohol use: No     Alcohol/week: 0.8 standard drinks     Types: 1 Glasses of wine per week     Comment: RARE    Drug use: No    Sexual activity: Never         MEDICATIONS     Current Outpatient Medications   Medication Sig    amLODIPine (NORVASC) 5 mg tablet Take 5 mg by mouth daily.     omega 3-dha-epa-fish oil (Fish OiL) 100-160-1,000 mg cap Take  by mouth.    metoprolol succinate (TOPROL-XL) 25 mg XL tablet Take 1 Tab by mouth daily. losartan (COZAAR) 100 mg tablet TAKE 1 TABLET BY MOUTH DAILY. cpap machine kit by Does Not Apply route. omeprazole (PRILOSEC) 40 mg capsule Take 40 mg by mouth two (2) times a day. bumetanide (BUMEX) 0.5 mg tablet Take  by mouth daily. calcium carbonate/vitamin D3 (CALCIUM + D PO) Take 1,200 mg by mouth daily. docusate sodium (DOK) 250 mg capsule Take 250 mg by mouth daily. DULoxetine (CYMBALTA) 30 mg capsule Take 1 Cap by mouth daily. traMADol (ULTRAM) 50 mg tablet TAKE 1 TABLET BY MOUTH EVERY 6 HRS AS NEEDED FOR PAIN    fluticasone (FLONASE) 50 mcg/actuation nasal spray INHALE 2 PUFFS INTO EACH NOSTRIL DAILY    cholecalciferol (VITAMIN D3) 1,000 unit cap Take 2,000 Units by mouth daily. acetaminophen (TYLENOL) 650 mg TbER Take 650 mg by mouth four (4) times daily. aspirin 81 mg chewable tablet Take 1 Tab by mouth daily. MULTIVITAMINS (MULTIPLE VITAMIN PO) Take  by mouth.    pantoprazole (PROTONIX) 40 mg tablet Take 40 mg by mouth nightly. (Patient not taking: Reported on 1/10/2023)    rosuvastatin (CRESTOR) 5 mg tablet Take 1 Tab by mouth nightly. (Patient not taking: Reported on 1/10/2023)    raNITIdine (ZANTAC) 300 mg tab Take 1 Tab by mouth daily. (Patient not taking: Reported on 1/10/2023)    ALPRAZolam (XANAX) 0.5 mg tablet TAKE 1 TABLET BY MOUTH 3 TIMES A DAY AS NEEDED (Patient not taking: Reported on 1/10/2023)     No current facility-administered medications for this visit. I have reviewed the nurses notes, vitals, problem list, allergy list, medical history, family, social history and medications. REVIEW OF SYMPTOMS   Current positive per HPI  General: Pt denies excessive weight gain or loss.  Pt is able to conduct ADL's  HEENT: Denies blurred vision, headaches, hearing loss, epistaxis and difficulty swallowing. Respiratory: Denies cough, congestion, shortness of breath, PARRISH, wheezing or stridor. Cardiovascular: Denies precordial pain, or PND  +palpitations  Gastrointestinal: Denies poor appetite, indigestion, abdominal pain or blood in stool  Genitourinary: Denies hematuria, dysuria, increased urinary frequency  Musculoskeletal: Denies joint pain or swelling from muscles or joints  +bialteral leg pain  Neurologic: Denies tremor, paresthesias, headache, or sensory motor disturbance  Psychiatric: Denies confusion, insomnia, depression  Integumentray: Denies rash, itching or ulcers. Hematologic: Denies easy bruising, bleeding     PHYSICAL EXAMINATION      Vitals:    01/10/23 1442   BP: 138/84   Pulse: 69   SpO2: 95%   Weight: 202 lb (91.6 kg)   Height: 5' 2\" (1.575 m)       General: Well developed, in no acute distress. HEENT: No jaundice, oral mucosa moist, no oral ulcers  Neck: Supple, no stiffness, no lymphadenopathy, supple  Heart:  Normal S1/S2 negative S3 or S4. Regular, no murmur, gallop or rub, no jugular venous distention  Respiratory: Clear bilaterally x 4, no wheezing or rales  Abdomen:   Soft, non-tender, bowel sounds are active. Extremities:  No edema, normal cap refill, no cyanosis. Musculoskeletal: No clubbing, no deformities  Neuro: A&Ox3, speech clear, gait stable, cooperative, no focal neurologic deficits  Skin: Skin color is normal. No rashes or lesions. Non diaphoretic, moist.  Vascular: Could not detect DP pulse           DIAGNOSTIC DATA     1. Stress Echo   8/11/09 - normal     2. Echo   2/11/16 - EF 60%   10/15/19-EF 61 - 65%, mild AV sclerosis with no significant stenosis     3. EKG   2/8/16 - NSR     4. Cholesterol   2/10/16 - , HDL 52, ,    3/7/19- , HDL 48, ,    9/10/19- , HDL 47, LDL 75,      5. TSH   10/10/15 - normal     6. Cardiolite   3/21-22/16 No Ischemia     7.  Loop recorder   (2/20/16-3/20/16): 52->78 BPM with one episode of tachycardia to 151     8. LE Venous Doppler   19- No evidence of deep vein thrombosis or venous obstruction within the lower extremities bilaterally. 9. MC  19-Right: Significant peripheral vascular disease with a moderate reduction of the MC (0.68) post exercise,  Left:  Significant peripheral vascular disease with a moderate reduction of the MC (0.76) post exercise. LABORATORY DATA            Lab Results   Component Value Date/Time    WBC 5.3 2015 09:28 AM    Hemoglobin (POC) 14.6 2016 03:52 PM    HGB 13.8 2017 10:08 AM    Hematocrit (POC) 43 2016 03:52 PM    HCT 42.2 2017 10:08 AM    PLATELET 112  09:28 AM    MCV 85.9 2015 09:28 AM      Lab Results   Component Value Date/Time    Sodium 143 2017 10:08 AM    Potassium 4.9 2017 10:08 AM    Chloride 102 2017 10:08 AM    CO2 23 2017 10:08 AM    Anion gap 14 2015 09:28 AM    Glucose 91 2017 10:08 AM    BUN 26 2017 10:08 AM    Creatinine 0.92 2017 10:08 AM    BUN/Creatinine ratio 28 2017 10:08 AM    GFR est AA 70 2017 10:08 AM    GFR est non-AA 61 2017 10:08 AM    Calcium 9.3 2017 10:08 AM    Bilirubin, total 0.5 09/10/2019 02:08 PM    Alk. phosphatase 75 09/10/2019 02:08 PM    Protein, total 6.9 09/10/2019 02:08 PM    Albumin 4.4 09/10/2019 02:08 PM    Globulin 4.0 2015 09:28 AM    A-G Ratio 1.6 10/26/2015 03:44 PM    ALT (SGPT) 14 09/10/2019 02:08 PM        EC/10/23 NSR   ASSESSMENT/RECOMMENDATIONS:.      1. HTN  - /84  -On amlodipine 5 mg a day metoprolol XL 25 mg a day losartan 100 mg a day and Bumex 0.5 mg daily  - Recheck BP in 2 weeks. - Encouraged her to eat a low sodium diet. 2. Palpitations  -No complaints of palpitations    3. Dyslipidemia  -Levels followed by her primary care and we need to obtain those results    4.  Claudication sx's  - MC's ordered today that revealed significant vascular disease, with moderate reduction in MC post exercise. R= 0.68, L= 0.76  -She was scheduled to see Dr. Pierce Del Rosario but apparently this was never occurred  -She is having symptoms of claudication so we will recheck ABIs  -We will arrange for her to see Dr. Canelo Nash see me and 8 weeks    5. Shortness of breath  -We will check echocardiogram to ensure there is no decline in her EF    F/u as scheduled. Orders Placed This Encounter    AMB POC EKG ROUTINE W/ 12 LEADS, INTER & REP     Order Specific Question:   Reason for Exam:     Answer:   HTN    amLODIPine (NORVASC) 5 mg tablet     Sig: Take 5 mg by mouth daily. omega 3-dha-epa-fish oil (Fish OiL) 100-160-1,000 mg cap     Sig: Take  by mouth. Follow-up and Dispositions    Return in about 8 weeks (around 3/7/2023). I have discussed the diagnosis with  Nir Robertson and the intended plan as seen in the above orders. Questions were answered concerning future plans. I have discussed medication side effects and warnings with the patient as well. Thank you,  Deanna Larios MD for involving me in the care of  Nir Robertson. Please do not hesitate to contact me for further questions/concerns. Hubert Mello MD, [facility]    Patient Care Team:  Deanna Larios MD as PCP - General (Family Medicine)  Bo Brown MD as Physician (Cardiovascular Disease Physician)  Mateo Carrasco RN as Nurse Navigator  Gila Nugent MD (General and Vascular Surgery)    [address], [address] [place] [address]     [address]      [phone] / [phone] Fax

## 2023-01-10 NOTE — PATIENT INSTRUCTIONS

## 2023-01-10 NOTE — PROGRESS NOTES
Turner Nye is a [de-identified] y.o. female    Vitals:    01/10/23 1442   BP: 138/84   BP 1 Location: Left upper arm   BP Patient Position: Sitting   BP Cuff Size: Adult   Pulse: 69   Height: 5' 2\" (1.575 m)   Weight: 202 lb (91.6 kg)   SpO2: 95%       Chief Complaint   Patient presents with    Other     MC, NUMBNESS IN LEGS       Chest pain NO  SOB NO  Dizziness NO  Swelling FEET AND LEGS, FEET GOING NUMB  Recent hospital visit NO  Refills NO  COVID VACCINE YES  HAD COVID?  NO

## 2023-01-16 ENCOUNTER — ANCILLARY PROCEDURE (OUTPATIENT)
Dept: CARDIOLOGY CLINIC | Age: 83
End: 2023-01-16
Payer: MEDICARE

## 2023-01-16 DIAGNOSIS — G47.33 OSA (OBSTRUCTIVE SLEEP APNEA): ICD-10-CM

## 2023-01-16 DIAGNOSIS — I10 ESSENTIAL HYPERTENSION WITH GOAL BLOOD PRESSURE LESS THAN 140/90: ICD-10-CM

## 2023-01-16 DIAGNOSIS — E78.9 LIPID DISORDER: ICD-10-CM

## 2023-01-16 LAB
ABI POST MINUTES1: 2 MIN
IMMEDIATE LEFT ABI: 0.97
IMMEDIATE RIGHT ABI: 0.5
LEFT ABI PERO ATA: 0.8
LEFT ABI: 0.92
LEFT ANTERIOR TIBIAL: 133 MMHG
LEFT ARM BP: 165 MMHG
LEFT POSTERIOR TIBIAL: 153 MMHG
POST1 LEFT ABI: 0.99
POST1 RIGHT ABI: 0.75
RIGHT ABI: 0.77
RIGHT ARM BP: 167 MMHG
RIGHT POSTERIOR TIBIAL: 129 MMHG
TREADMILL TIME: 5 MIN
VAS IMMEDIATE LEFT POST TIBIAL BP: 172 MMHG
VAS IMMEDIATE RIGHT BRACHIAL BP: 178 MMHG
VAS IMMEDIATE RIGHT POST TIBIAL BP: 89 MMHG
VAS POST1 LEFT POST TIBIAL BP: 171 MMHG
VAS POST1 RIGHT BRACHIAL BP: 173 MMHG
VAS POST1 RIGHT POST TIBIAL BP: 129 MMHG
VAS RIGHT DORSALIS PEDIS BP: 123 MMHG

## 2023-01-16 PROCEDURE — 93922 UPR/L XTREMITY ART 2 LEVELS: CPT | Performed by: SPECIALIST

## 2023-01-21 NOTE — PROGRESS NOTES
I have attempted to call you a number of times and left a message on 1/21/23 at 2:10 pm.     The arterial studies of you leg arteries reveal that there may be reduced blood flow to your right leg. I think we should go forward with seeing the vascular surgeon, Luz Has, I will have Graham Iverson arrange.     All the best,    [place]     All the best,    [place]

## 2023-02-05 ENCOUNTER — HOSPITAL ENCOUNTER (OUTPATIENT)
Age: 83
Setting detail: OBSERVATION
Discharge: HOME | End: 2023-02-06
Attending: EMERGENCY MEDICINE | Admitting: HOSPITALIST
Payer: MEDICARE

## 2023-02-05 ENCOUNTER — APPOINTMENT (OUTPATIENT)
Dept: CT IMAGING | Age: 83
End: 2023-02-05
Attending: EMERGENCY MEDICINE
Payer: MEDICARE

## 2023-02-05 ENCOUNTER — APPOINTMENT (OUTPATIENT)
Dept: CT IMAGING | Age: 83
End: 2023-02-05
Attending: HOSPITALIST
Payer: MEDICARE

## 2023-02-05 DIAGNOSIS — R29.898 LEFT ARM WEAKNESS: Primary | ICD-10-CM

## 2023-02-05 DIAGNOSIS — R09.02 HYPOXIA: ICD-10-CM

## 2023-02-05 PROBLEM — R20.0 LEFT SIDED NUMBNESS: Status: ACTIVE | Noted: 2023-02-05

## 2023-02-05 LAB
ALBUMIN SERPL-MCNC: 3.8 G/DL (ref 3.5–5)
ALBUMIN/GLOB SERPL: 1 (ref 1.1–2.2)
ALP SERPL-CCNC: 71 U/L (ref 45–117)
ALT SERPL-CCNC: 17 U/L (ref 12–78)
ANION GAP SERPL CALC-SCNC: 6 MMOL/L (ref 5–15)
AST SERPL-CCNC: 17 U/L (ref 15–37)
BASOPHILS # BLD: 0.1 K/UL (ref 0–0.1)
BASOPHILS NFR BLD: 1 % (ref 0–1)
BILIRUB SERPL-MCNC: 0.5 MG/DL (ref 0.2–1)
BNP SERPL-MCNC: 136 PG/ML
BUN SERPL-MCNC: 17 MG/DL (ref 6–20)
BUN/CREAT SERPL: 14 (ref 12–20)
CALCIUM SERPL-MCNC: 9.1 MG/DL (ref 8.5–10.1)
CHLORIDE SERPL-SCNC: 109 MMOL/L (ref 97–108)
CO2 SERPL-SCNC: 25 MMOL/L (ref 21–32)
COMMENT, HOLDF: NORMAL
CREAT SERPL-MCNC: 1.2 MG/DL (ref 0.55–1.02)
DIFFERENTIAL METHOD BLD: ABNORMAL
EOSINOPHIL # BLD: 0.2 K/UL (ref 0–0.4)
EOSINOPHIL NFR BLD: 2 % (ref 0–7)
ERYTHROCYTE [DISTWIDTH] IN BLOOD BY AUTOMATED COUNT: 15.1 % (ref 11.5–14.5)
GLOBULIN SER CALC-MCNC: 3.8 G/DL (ref 2–4)
GLUCOSE BLD STRIP.AUTO-MCNC: 120 MG/DL (ref 65–117)
GLUCOSE SERPL-MCNC: 130 MG/DL (ref 65–100)
HCT VFR BLD AUTO: 42.2 % (ref 35–47)
HGB BLD-MCNC: 13.4 G/DL (ref 11.5–16)
IMM GRANULOCYTES # BLD AUTO: 0 K/UL (ref 0–0.04)
IMM GRANULOCYTES NFR BLD AUTO: 0 % (ref 0–0.5)
INR PPP: 1 (ref 0.9–1.1)
LYMPHOCYTES # BLD: 3.2 K/UL (ref 0.8–3.5)
LYMPHOCYTES NFR BLD: 32 % (ref 12–49)
MCH RBC QN AUTO: 27.8 PG (ref 26–34)
MCHC RBC AUTO-ENTMCNC: 31.8 G/DL (ref 30–36.5)
MCV RBC AUTO: 87.6 FL (ref 80–99)
MONOCYTES # BLD: 0.8 K/UL (ref 0–1)
MONOCYTES NFR BLD: 8 % (ref 5–13)
NEUTS SEG # BLD: 5.5 K/UL (ref 1.8–8)
NEUTS SEG NFR BLD: 57 % (ref 32–75)
NRBC # BLD: 0 K/UL (ref 0–0.01)
NRBC BLD-RTO: 0 PER 100 WBC
PLATELET # BLD AUTO: 291 K/UL (ref 150–400)
PMV BLD AUTO: 9.5 FL (ref 8.9–12.9)
POTASSIUM SERPL-SCNC: 3.6 MMOL/L (ref 3.5–5.1)
PROT SERPL-MCNC: 7.6 G/DL (ref 6.4–8.2)
PROTHROMBIN TIME: 10.5 SEC (ref 9–11.1)
RBC # BLD AUTO: 4.82 M/UL (ref 3.8–5.2)
SAMPLES BEING HELD,HOLD: NORMAL
SERVICE CMNT-IMP: ABNORMAL
SODIUM SERPL-SCNC: 140 MMOL/L (ref 136–145)
TROPONIN I SERPL HS-MCNC: 6 NG/L (ref 0–51)
WBC # BLD AUTO: 9.7 K/UL (ref 3.6–11)

## 2023-02-05 PROCEDURE — 0042T CT CODE NEURO PERF W CBF: CPT

## 2023-02-05 PROCEDURE — 84484 ASSAY OF TROPONIN QUANT: CPT

## 2023-02-05 PROCEDURE — 85610 PROTHROMBIN TIME: CPT

## 2023-02-05 PROCEDURE — 74011000636 HC RX REV CODE- 636: Performed by: RADIOLOGY

## 2023-02-05 PROCEDURE — 96372 THER/PROPH/DIAG INJ SC/IM: CPT

## 2023-02-05 PROCEDURE — 82962 GLUCOSE BLOOD TEST: CPT

## 2023-02-05 PROCEDURE — 85025 COMPLETE CBC W/AUTO DIFF WBC: CPT

## 2023-02-05 PROCEDURE — 70450 CT HEAD/BRAIN W/O DYE: CPT

## 2023-02-05 PROCEDURE — 71275 CT ANGIOGRAPHY CHEST: CPT

## 2023-02-05 PROCEDURE — 36415 COLL VENOUS BLD VENIPUNCTURE: CPT

## 2023-02-05 PROCEDURE — 80053 COMPREHEN METABOLIC PANEL: CPT

## 2023-02-05 PROCEDURE — 74011250636 HC RX REV CODE- 250/636: Performed by: HOSPITALIST

## 2023-02-05 PROCEDURE — 70496 CT ANGIOGRAPHY HEAD: CPT

## 2023-02-05 PROCEDURE — 99285 EMERGENCY DEPT VISIT HI MDM: CPT

## 2023-02-05 PROCEDURE — G0378 HOSPITAL OBSERVATION PER HR: HCPCS

## 2023-02-05 PROCEDURE — 93005 ELECTROCARDIOGRAM TRACING: CPT

## 2023-02-05 PROCEDURE — 83880 ASSAY OF NATRIURETIC PEPTIDE: CPT

## 2023-02-05 PROCEDURE — 96374 THER/PROPH/DIAG INJ IV PUSH: CPT

## 2023-02-05 RX ORDER — ACETAMINOPHEN 650 MG/1
650 SUPPOSITORY RECTAL
Status: DISCONTINUED | OUTPATIENT
Start: 2023-02-05 | End: 2023-02-06 | Stop reason: HOSPADM

## 2023-02-05 RX ORDER — GUAIFENESIN 100 MG/5ML
81 LIQUID (ML) ORAL DAILY
Status: DISCONTINUED | OUTPATIENT
Start: 2023-02-06 | End: 2023-02-06 | Stop reason: HOSPADM

## 2023-02-05 RX ORDER — PANTOPRAZOLE SODIUM 40 MG/1
40 TABLET, DELAYED RELEASE ORAL
Status: DISCONTINUED | OUTPATIENT
Start: 2023-02-06 | End: 2023-02-06 | Stop reason: HOSPADM

## 2023-02-05 RX ORDER — FUROSEMIDE 10 MG/ML
40 INJECTION INTRAMUSCULAR; INTRAVENOUS EVERY 12 HOURS
Status: DISCONTINUED | OUTPATIENT
Start: 2023-02-05 | End: 2023-02-06 | Stop reason: HOSPADM

## 2023-02-05 RX ORDER — ENOXAPARIN SODIUM 100 MG/ML
40 INJECTION SUBCUTANEOUS EVERY 24 HOURS
Status: DISCONTINUED | OUTPATIENT
Start: 2023-02-05 | End: 2023-02-06 | Stop reason: HOSPADM

## 2023-02-05 RX ORDER — AMLODIPINE BESYLATE 5 MG/1
5 TABLET ORAL DAILY
Status: DISCONTINUED | OUTPATIENT
Start: 2023-02-06 | End: 2023-02-06 | Stop reason: HOSPADM

## 2023-02-05 RX ORDER — DULOXETIN HYDROCHLORIDE 30 MG/1
30 CAPSULE, DELAYED RELEASE ORAL DAILY
Status: DISCONTINUED | OUTPATIENT
Start: 2023-02-06 | End: 2023-02-06 | Stop reason: HOSPADM

## 2023-02-05 RX ORDER — FLUTICASONE PROPIONATE 50 MCG
2 SPRAY, SUSPENSION (ML) NASAL DAILY
Status: DISCONTINUED | OUTPATIENT
Start: 2023-02-06 | End: 2023-02-06 | Stop reason: HOSPADM

## 2023-02-05 RX ORDER — METOPROLOL SUCCINATE 25 MG/1
25 TABLET, EXTENDED RELEASE ORAL DAILY
Status: DISCONTINUED | OUTPATIENT
Start: 2023-02-06 | End: 2023-02-06 | Stop reason: HOSPADM

## 2023-02-05 RX ORDER — TRAMADOL HYDROCHLORIDE 50 MG/1
50 TABLET ORAL
Status: DISCONTINUED | OUTPATIENT
Start: 2023-02-05 | End: 2023-02-06 | Stop reason: HOSPADM

## 2023-02-05 RX ORDER — ACETAMINOPHEN 325 MG/1
650 TABLET ORAL
Status: DISCONTINUED | OUTPATIENT
Start: 2023-02-05 | End: 2023-02-06 | Stop reason: HOSPADM

## 2023-02-05 RX ADMIN — IOPAMIDOL 140 ML: 755 INJECTION, SOLUTION INTRAVENOUS at 17:24

## 2023-02-05 RX ADMIN — FUROSEMIDE 40 MG: 10 INJECTION, SOLUTION INTRAMUSCULAR; INTRAVENOUS at 21:29

## 2023-02-05 RX ADMIN — ENOXAPARIN SODIUM 40 MG: 100 INJECTION SUBCUTANEOUS at 23:48

## 2023-02-05 RX ADMIN — IOPAMIDOL 64 ML: 755 INJECTION, SOLUTION INTRAVENOUS at 20:51

## 2023-02-05 NOTE — ED NOTES
Pt passed swallow screen but reports that she has \" occasionally getting strangled by food and liquids since around September. \" Speech Therapist consult performed to advise on PO status until evaluated.

## 2023-02-05 NOTE — ED PROVIDER NOTES
The history is provided by the patient and a relative. No  was used. Numbness  This is a new problem. The current episode started 1 to 2 hours ago. The problem has not changed since onset. There was left upper extremity focality noted. Pertinent negatives include no focal weakness, no loss of sensation, no loss of balance, no slurred speech, no speech difficulty, no memory loss, no movement disorder, no agitation, no visual change, no auditory change, no mental status change, no unresponsiveness and no disorientation. There has been no fever. Associated symptoms include chest pain and headaches. Pertinent negatives include no shortness of breath, no vomiting, no altered mental status, no confusion, no choking, no nausea, no bowel incontinence and no bladder incontinence.       Past Medical History:   Diagnosis Date    Current smoker     4 cig pd    Depression 7/15/2010    GERD (gastroesophageal reflux disease) 7/15/2010    High cholesterol 7/15/2010    HTN (hypertension)     Obesity (BMI 30-39.9)     ALESIA (obstructive sleep apnea) 7/15/2010    Osteopenia 7/15/2010    Pseudogout 7/15/2010       Past Surgical History:   Procedure Laterality Date    ENDOSCOPY, COLON, DIAGNOSTIC  2006    Dr. David Miguel  late 19's    JESSICA, one ovary intact, due to bleeding    HX ORTHOPAEDIC      right wrist and neck    HX UROLOGICAL  2011    Bladder sling - Dr. Ivy Lynch         Family History:   Problem Relation Age of Onset    Diabetes Father     Arthritis-rheumatoid Father     Heart Disease Father     Cancer Maternal Aunt     Breast Cancer Maternal Aunt     Cancer Other     Breast Cancer Sister 58    Breast Cancer Maternal Aunt        Social History     Socioeconomic History    Marital status: SINGLE     Spouse name: Not on file    Number of children: Not on file    Years of education: Not on file    Highest education level: Not on file   Occupational History    Not on file   Tobacco Use    Smoking status: Former Packs/day: 0.50     Types: Cigarettes     Quit date: 2010     Years since quittin.4    Smokeless tobacco: Never   Substance and Sexual Activity    Alcohol use: No     Alcohol/week: 0.8 standard drinks     Types: 1 Glasses of wine per week     Comment: RARE    Drug use: No    Sexual activity: Never   Other Topics Concern    Not on file   Social History Narrative    Not on file     Social Determinants of Health     Financial Resource Strain: Not on file   Food Insecurity: Not on file   Transportation Needs: Not on file   Physical Activity: Not on file   Stress: Not on file   Social Connections: Not on file   Intimate Partner Violence: Not on file   Housing Stability: Not on file         ALLERGIES: Bactrim [sulfamethoprim], Gabapentin, Lipitor [atorvastatin], Roxicodone [oxycodone], Tetanus-diphtheria toxoids-td, and Welchol [colesevelam]    Review of Systems   Constitutional:  Negative for activity change, chills and fever. HENT:  Negative for nosebleeds, sore throat, trouble swallowing and voice change. Eyes:  Negative for visual disturbance. Respiratory:  Negative for choking and shortness of breath. Cardiovascular:  Positive for chest pain. Negative for palpitations. Gastrointestinal:  Negative for abdominal pain, bowel incontinence, constipation, diarrhea, nausea and vomiting. Genitourinary:  Negative for bladder incontinence, difficulty urinating, dysuria, hematuria and urgency. Musculoskeletal:  Positive for neck pain. Negative for back pain and neck stiffness. Skin:  Negative for color change. Allergic/Immunologic: Negative for immunocompromised state. Neurological:  Positive for numbness and headaches. Negative for dizziness, focal weakness, seizures, syncope, speech difficulty, weakness, light-headedness and loss of balance. Psychiatric/Behavioral:  Negative for agitation, behavioral problems, confusion, hallucinations, memory loss, self-injury and suicidal ideas. Vitals:    02/05/23 1532   BP: 122/67   Pulse: 78   Resp: 16   Temp: 97.8 °F (36.6 °C)   SpO2: 96%   Weight: 90.3 kg (199 lb)   Height: 5' 2\" (1.575 m)            Physical Exam  Vitals and nursing note reviewed. Constitutional:       General: She is not in acute distress. Appearance: She is well-developed. She is not diaphoretic. HENT:      Head: Atraumatic. Neck:      Trachea: No tracheal deviation. Cardiovascular:      Comments: Warm and well perfused  Pulmonary:      Effort: Pulmonary effort is normal. No respiratory distress. Musculoskeletal:         General: Normal range of motion. Skin:     General: Skin is warm and dry. Neurological:      Mental Status: She is alert. Cranial Nerves: Cranial nerves 2-12 are intact. Sensory: Sensory deficit present. Motor: Motor function is intact. Coordination: Coordination normal.   Psychiatric:         Behavior: Behavior normal.         Thought Content: Thought content normal.         Judgment: Judgment normal.        Medical Decision Making  Amount and/or Complexity of Data Reviewed  Labs: ordered. Radiology: ordered. ECG/medicine tests: ordered. Risk  Prescription drug management. Decision regarding hospitalization. This is an [de-identified] female with past medical history, review of systems, physical exam as above, presenting with complaints of left upper extremity decreased sensation, headache left-sided occiput radiating to the left neck. She endorses symptoms onset approximately an hour ago. She denies weakness, denies previous CVA, however endorses she had a vascular procedure performed in the right lower extremity last week. She endorses a history of hypertension, hyperlipidemia. Physical exam is remarkable for well-appearing elderly female, in no acute distress noted to be normotensive, afebrile without tachycardia, satting well on room air.   Cranial nerves intact,  strength equal in the upper extremities, however with decreased sensation in the left hand and left arm. Will alert for possible stroke, CT, CTA, CMP, CBC, blood glucose, coags, EKG. We will consult with teleneurology, and make a disposition. Procedures    Perfect Serve Consult for Admission  5:56 PM    ED Room Number: ER08/08  Patient Name and age: Angela Copeland [de-identified] y.o.  female  Working Diagnosis:   1. Left arm weakness    2. Hypoxia        COVID-19 Suspicion:  no  Sepsis present:  no  Reassessment needed: no  Code Status:  Full Code  Readmission: no  Isolation Requirements:  no  Recommended Level of Care:  telemetry  Department: [facility] ED - [phone]  Admitting Provider: d/w Dr. Farhana Amaro    Total critical care time spent exclusive of procedures:  45 minutes.

## 2023-02-05 NOTE — H&P
[address]  [phone]    Hospitalist Admission Note      NAME:  Emma Fofana   :   1940   MRN:  [id number]     PCP:  Tian De La Cruz MD     Date/Time of service:  2023 6:11 PM          Subjective:     CHIEF COMPLAINT: Left arm numbness    HISTORY OF PRESENT ILLNESS:     Ms. Chris Mars is a [de-identified] y.o.  female who presented to the Emergency Department complaining of left arm numbness. As per the patient her symptoms started around 2 PM today. It started with the neck pain and headache. She started having chest heaviness, right jaw pain and bilateral arm pains with numbness. Left arm numbness was more prominent. She came to the ER and code stroke was called. She denies any weakness in any extremities. She denies any nausea vomiting or constipation. No fever chills. As per the patient she gets left arm numbness if she has a neck pain. She had a CTA of the head and neck which is negative for any large vessel occlusion. She denies any slurring of speech. She was also hypoxic when she arrived to the ER. She denies being on oxygen at home. She is requiring 2 L of oxygen at this time. She had a right lower extremity angioplasty done on Friday for peripheral vascular disease. Her creatinine is 1.20 baseline creatinine runs around 0.8.       Past Medical History:   Diagnosis Date    Current smoker     4 cig pd    Depression 7/15/2010    GERD (gastroesophageal reflux disease) 7/15/2010    High cholesterol 7/15/2010    HTN (hypertension)     Obesity (BMI 30-39.9)     ALESIA (obstructive sleep apnea) 7/15/2010    Osteopenia 7/15/2010    Pseudogout 7/15/2010        Past Surgical History:   Procedure Laterality Date    ENDOSCOPY, COLON, DIAGNOSTIC      Dr. Laverne Freed GYN  late     JESSICA, one ovary intact, due to bleeding    HX ORTHOPAEDIC      right wrist and neck    HX UROLOGICAL      Bladder sling -  Maura Rivera       Social History     Tobacco Use    Smoking status: Former     Packs/day: 0.50     Types: Cigarettes     Quit date: 2010     Years since quittin.4    Smokeless tobacco: Never   Substance Use Topics    Alcohol use: No     Alcohol/week: 0.8 standard drinks     Types: 1 Glasses of wine per week     Comment: RARE        Family History   Problem Relation Age of Onset    Diabetes Father     Arthritis-rheumatoid Father     Heart Disease Father     Cancer Maternal Aunt     Breast Cancer Maternal Aunt     Cancer Other     Breast Cancer Sister 58    Breast Cancer Maternal Aunt       Sister with CVA    Allergies   Allergen Reactions    Bactrim [Sulfamethoprim] Swelling    Gabapentin Other (comments)     headache    Lipitor [Atorvastatin] Other (comments)     Aches      Roxicodone [Oxycodone] Swelling    Tetanus-Diphtheria Toxoids-Td Swelling     Allergic to Td? Welchol [Colesevelam] Other (comments)     Aches          Prior to Admission medications    Medication Sig Start Date End Date Taking? Authorizing Provider   amLODIPine (NORVASC) 5 mg tablet Take 5 mg by mouth daily. Provider, Historical   omega 3-dha-epa-fish oil (Fish OiL) 100-160-1,000 mg cap Take  by mouth. Provider, Historical   metoprolol succinate (TOPROL-XL) 25 mg XL tablet Take 1 Tab by mouth daily. 3/30/21   Karan Singh MD   losartan (COZAAR) 100 mg tablet TAKE 1 TABLET BY MOUTH DAILY. 3/25/20   Karan Singh MD   cpap machine kit by Does Not Apply route. Provider, Historical   omeprazole (PRILOSEC) 40 mg capsule Take 40 mg by mouth two (2) times a day. Provider, Historical   bumetanide (BUMEX) 0.5 mg tablet Take  by mouth daily. Provider, Historical   calcium carbonate/vitamin D3 (CALCIUM + D PO) Take 1,200 mg by mouth daily. Provider, Historical   docusate sodium (DOK) 250 mg capsule Take 250 mg by mouth daily. Provider, Historical   DULoxetine (CYMBALTA) 30 mg capsule Take 1 Cap by mouth daily.  18 Benja Ceja MD   traMADol Chryl Brood) 50 mg tablet TAKE 1 TABLET BY MOUTH EVERY 6 HRS AS NEEDED FOR PAIN 6/23/18   Benja Ceja MD   fluticasone [facility]) 50 mcg/actuation nasal spray INHALE 2 PUFFS INTO EACH NOSTRIL DAILY 11/10/16   Benja Ceja MD   cholecalciferol (VITAMIN D3) 1,000 unit cap Take 2,000 Units by mouth daily. Provider, Historical   acetaminophen (TYLENOL) 650 mg TbER Take 650 mg by mouth four (4) times daily. Provider, Historical   aspirin 81 mg chewable tablet Take 1 Tab by mouth daily. 3/17/11   Benja Ceja MD   MULTIVITAMINS (MULTIPLE VITAMIN PO) Take  by mouth. Provider, Historical       Review of Systems:  (bold if positive, if negative)    Gen:  Eyes:  ENT:  CVS:  Pulm:  GI:  :  MS:  Skin:  Psych:  Endo:  Hem:  Renal:  Neuro: Left arm numbness       Objective:      VITALS:    Vital signs reviewed; most recent are:    Visit Vitals  BP (!) 144/65   Pulse 77   Temp 97.8 °F (36.6 °C)   Resp 15   Ht 5' 2\" (1.575 m)   Wt 90.3 kg (199 lb)   SpO2 (!) 88%   BMI 36.40 kg/m²     SpO2 Readings from Last 6 Encounters:   02/05/23 (!) 88%   01/10/23 95%   06/26/18 97%   05/08/18 95%   02/07/18 93%   12/19/17 96%        No intake or output data in the 24 hours ending 02/05/23 1811     Exam:     Physical Exam:    Gen:  Well-developed, well-nourished, in no acute distress  HEENT:  Pink conjunctivae, PERRL, hearing intact to voice, moist mucous membranes  Neck:  Supple, without masses, thyroid non-tender  Resp:  No accessory muscle use, clear breath sounds without wheezes rales or rhonchi  Card:  No murmurs, normal S1, S2 without thrills, bruits or peripheral edema  Abd:  Soft, non-tender, non-distended, normoactive bowel sounds are present, no mass  Lymph:  No cervical or inguinal adenopathy  Musc:  No cyanosis or clubbing  Skin:  No rashes or ulcers, skin turgor is good  Neuro: Patient is alert awake oriented x3. Still complaining of left arm numbness. No motor weakness.   Psych: Good insight, oriented to person, place and time, alert     Labs:    Recent Labs     02/05/23  1537   WBC 9.7   HGB 13.4   HCT 42.2        Recent Labs     02/05/23  1537      K 3.6   *   CO2 25   *   BUN 17   CREA 1.20*   CA 9.1   ALB 3.8   TBILI 0.5   ALT 17     Lab Results   Component Value Date/Time    Glucose (POC) 120 (H) 02/05/2023 04:15 PM    Glucose (POC) 119 (H) 02/08/2016 03:52 PM    Glucose (POC) 158 (H) 07/13/2010 06:49 PM     No results for input(s): PH, PCO2, PO2, HCO3, FIO2 in the last 72 hours. No results for input(s): INR, INREXT in the last 72 hours. All Micro Results       None            I have reviewed previous records       Assessment and Plan: Active Problems:    Left sided numbness (2/5/2023)         1. Left arm numbness-we will rule out stroke. Patient had a CTA of head and neck which is negative for any large vessel occlusion. We will do echocardiogram and MRI of the brain. Neurology consult in the morning. She is on aspirin at this time. She is not on any statins though. I will check lipid profile. PT OT evaluation. 2.  Peripheral vascular disease-patient had a angioplasty done on Friday. She is on aspirin. 3.  Obstructive sleep apnea-on CPAP at night. 4.  Hypertension-tinea Norvasc and Toprol-XL. Holding losartan at this time. 5.  GERD-continue PPI. 6.  Depression-continue Cymbalta. 7.  Acute hypoxemic respiratory failure-probably due to fluid overload. Check proBNP. Patient will be given IV Lasix. I will get an echocardiogram.     8.  DVT prophylaxis-Lovenox subcu. Patient is a full code. Telemetry reviewed:   normal sinus rhythm    Risk of deterioration: low      Total time spent with patient: 79 Minutes I personally reviewed chart, notes, data and current medications in the medical record. I have personally examined and treated the patient at bedside during this period.  To assist coordination of care and communication with nursing and staff, this note may be preliminary early in the day, but finalized by end of the day.                  Care Plan discussed with: Patient    Discussed:  Code Status and Care Plan       ___________________________________________________    Attending Physician: Tavo Sampson MD

## 2023-02-05 NOTE — ED NOTES
Stroke Education provided to patient and relative(s) and the following topics were discussed    1. Patients personal risk factors for stroke are hypertension    2. Warning signs of Stroke:        * Sudden numbness or weakness of the face, arm or leg, especially on one side of          The body            * Sudden confusion, trouble speaking or understanding        * Sudden trouble seeing in one or both eyes        * Sudden trouble walking, dizziness, loss of balance or coordination        * Sudden severe headache with no known cause      3. Importance of activation Emergency Medical Services ( 9-1-1 ) immediately if experience any warning signs of stroke. 4. Be sure and schedule a follow-up appointment with your primary care doctor or any specialists as instructed. 5. You must take medicine every day to treat your risk factors for stroke. Be sure to take your medicines exactly as your doctor tells you: no more, no less. Know what your medicines are for , what they do. Anti-thrombotics /anticoagulants can help prevent strokes. You are taking the following medicine(s)  ASA     6. Smoking and second-hand smoke greatly increase your risk of stroke, cardiovascular disease and death. Smoking history quit 7 or 8 years ago. Only smoked for 5 years. 7. Information provided was [facility] Stroke Education Binder    8. Documentation of teaching completed in Patient Education Activity and on Care Plan with teaching response noted?   no

## 2023-02-05 NOTE — PROGRESS NOTES
Rapid Called at 3734    Responded to RRT at [id number] for CODE STROKESigns & Symptoms: left hand numbness and tingling, Time of Code Stroke Activation: 5626, Provider at Bedside Time: 1614, VAN score: Negative, and Last Known Well (Time): 6715 6070: Code stroke called. 1617: RRT at bedside  1620: Pt to CT  1630: Pt back to room- tele on the screen evaluating pt.        Visit Vitals  /67 (BP 1 Location: Left arm, BP Patient Position: Sitting)   Pulse 81   Temp 97.8 °F (36.6 °C)   Resp 16   Ht 5' 2\" (1.575 m)   Wt 90.3 kg (199 lb)   SpO2 96%   BMI 36.40 kg/m²        Rapid Ended at [facility]01 [facility]

## 2023-02-05 NOTE — ED TRIAGE NOTES
Reports bilateral neck pain x2 hours with headache, right jaw pain, chest heaviness, bilateral arm pain (worse on right) with bilateral hand numbness. Also reports some SOB. Pt reports on Friday she had a cardac cath done where they \"took a balloon to open up a vessel then clean some arteries out\". Takes a baby Aspirin daily. Ambulatory to triage.

## 2023-02-06 ENCOUNTER — APPOINTMENT (OUTPATIENT)
Dept: MRI IMAGING | Age: 83
End: 2023-02-06
Attending: HOSPITALIST
Payer: MEDICARE

## 2023-02-06 ENCOUNTER — APPOINTMENT (OUTPATIENT)
Dept: NON INVASIVE DIAGNOSTICS | Age: 83
End: 2023-02-06
Attending: HOSPITALIST
Payer: MEDICARE

## 2023-02-06 VITALS
SYSTOLIC BLOOD PRESSURE: 166 MMHG | RESPIRATION RATE: 18 BRPM | HEIGHT: 62 IN | DIASTOLIC BLOOD PRESSURE: 68 MMHG | WEIGHT: 203 LBS | HEART RATE: 66 BPM | OXYGEN SATURATION: 95 % | TEMPERATURE: 98.3 F | BODY MASS INDEX: 37.36 KG/M2

## 2023-02-06 LAB
ATRIAL RATE: 80 BPM
BNP SERPL-MCNC: 93 PG/ML
CALCULATED P AXIS, ECG09: 117 DEGREES
CALCULATED R AXIS, ECG10: -17 DEGREES
CALCULATED T AXIS, ECG11: 11 DEGREES
CHOLEST SERPL-MCNC: 199 MG/DL
COMMENT, HOLDF: NORMAL
DIAGNOSIS, 93000: NORMAL
ECHO AO ASC DIAM: 2.8 CM
ECHO AO ASCENDING AORTA INDEX: 1.46 CM/M2
ECHO AV AREA PEAK VELOCITY: 2.2 CM2
ECHO AV AREA VTI: 2.2 CM2
ECHO AV AREA/BSA PEAK VELOCITY: 1.1 CM2/M2
ECHO AV AREA/BSA VTI: 1.1 CM2/M2
ECHO AV MEAN GRADIENT: 5 MMHG
ECHO AV MEAN VELOCITY: 1.1 M/S
ECHO AV PEAK GRADIENT: 10 MMHG
ECHO AV PEAK VELOCITY: 1.6 M/S
ECHO AV VELOCITY RATIO: 0.69
ECHO AV VTI: 33.4 CM
ECHO LA DIAMETER INDEX: 1.77 CM/M2
ECHO LA DIAMETER: 3.4 CM
ECHO LA VOL 2C: 36 ML (ref 22–52)
ECHO LA VOL 4C: 34 ML (ref 22–52)
ECHO LA VOL BP: 37 ML (ref 22–52)
ECHO LA VOL/BSA BIPLANE: 19 ML/M2 (ref 16–34)
ECHO LA VOLUME AREA LENGTH: 39 ML
ECHO LA VOLUME INDEX A2C: 19 ML/M2 (ref 16–34)
ECHO LA VOLUME INDEX A4C: 18 ML/M2 (ref 16–34)
ECHO LA VOLUME INDEX AREA LENGTH: 20 ML/M2 (ref 16–34)
ECHO LV E' LATERAL VELOCITY: 8 CM/S
ECHO LV E' SEPTAL VELOCITY: 7 CM/S
ECHO LV EDV A2C: 38 ML
ECHO LV EDV A4C: 53 ML
ECHO LV EDV BP: 46 ML (ref 56–104)
ECHO LV EDV INDEX A4C: 28 ML/M2
ECHO LV EDV INDEX BP: 24 ML/M2
ECHO LV EDV NDEX A2C: 20 ML/M2
ECHO LV EJECTION FRACTION A2C: 58 %
ECHO LV EJECTION FRACTION A4C: 57 %
ECHO LV EJECTION FRACTION BIPLANE: 59 % (ref 55–100)
ECHO LV ESV A2C: 16 ML
ECHO LV ESV A4C: 22 ML
ECHO LV ESV BP: 19 ML (ref 19–49)
ECHO LV ESV INDEX A2C: 8 ML/M2
ECHO LV ESV INDEX A4C: 11 ML/M2
ECHO LV ESV INDEX BP: 10 ML/M2
ECHO LV FRACTIONAL SHORTENING: 31 % (ref 28–44)
ECHO LV INTERNAL DIMENSION DIASTOLE INDEX: 2.19 CM/M2
ECHO LV INTERNAL DIMENSION DIASTOLIC: 4.2 CM (ref 3.9–5.3)
ECHO LV INTERNAL DIMENSION SYSTOLIC INDEX: 1.51 CM/M2
ECHO LV INTERNAL DIMENSION SYSTOLIC: 2.9 CM
ECHO LV IVSD: 1.1 CM (ref 0.6–0.9)
ECHO LV MASS 2D: 147 G (ref 67–162)
ECHO LV MASS INDEX 2D: 76.6 G/M2 (ref 43–95)
ECHO LV POSTERIOR WALL DIASTOLIC: 1 CM (ref 0.6–0.9)
ECHO LV RELATIVE WALL THICKNESS RATIO: 0.48
ECHO LVOT AREA: 3.1 CM2
ECHO LVOT AV VTI INDEX: 0.7
ECHO LVOT DIAM: 2 CM
ECHO LVOT MEAN GRADIENT: 2 MMHG
ECHO LVOT PEAK GRADIENT: 5 MMHG
ECHO LVOT PEAK VELOCITY: 1.1 M/S
ECHO LVOT STROKE VOLUME INDEX: 38.4 ML/M2
ECHO LVOT SV: 73.8 ML
ECHO LVOT VTI: 23.5 CM
ECHO MV A VELOCITY: 0.92 M/S
ECHO MV E DECELERATION TIME (DT): 242 MS
ECHO MV E VELOCITY: 0.75 M/S
ECHO MV E/A RATIO: 0.82
ECHO MV E/E' LATERAL: 9.38
ECHO MV E/E' RATIO (AVERAGED): 10.04
ECHO MV E/E' SEPTAL: 10.71
ECHO PV MAX VELOCITY: 1 M/S
ECHO PV PEAK GRADIENT: 4 MMHG
ECHO RV INTERNAL DIMENSION: 3.6 CM
ECHO RV TAPSE: 2.1 CM (ref 1.7–?)
ECHO RVOT PEAK GRADIENT: 2 MMHG
ECHO RVOT PEAK VELOCITY: 0.8 M/S
ECHO TV REGURGITANT MAX VELOCITY: 2.32 M/S
ECHO TV REGURGITANT PEAK GRADIENT: 22 MMHG
EST. AVERAGE GLUCOSE BLD GHB EST-MCNC: 108 MG/DL
HBA1C MFR BLD: 5.4 % (ref 4–5.6)
HDLC SERPL-MCNC: 53 MG/DL
HDLC SERPL: 3.8 (ref 0–5)
LDLC SERPL CALC-MCNC: 125.4 MG/DL (ref 0–100)
P-R INTERVAL, ECG05: 140 MS
Q-T INTERVAL, ECG07: 376 MS
QRS DURATION, ECG06: 82 MS
QTC CALCULATION (BEZET), ECG08: 433 MS
SAMPLES BEING HELD,HOLD: NORMAL
TRIGL SERPL-MCNC: 103 MG/DL (ref ?–150)
VENTRICULAR RATE, ECG03: 80 BPM
VLDLC SERPL CALC-MCNC: 20.6 MG/DL

## 2023-02-06 PROCEDURE — 70551 MRI BRAIN STEM W/O DYE: CPT

## 2023-02-06 PROCEDURE — 96376 TX/PRO/DX INJ SAME DRUG ADON: CPT

## 2023-02-06 PROCEDURE — 99222 1ST HOSP IP/OBS MODERATE 55: CPT | Performed by: PSYCHIATRY & NEUROLOGY

## 2023-02-06 PROCEDURE — 36415 COLL VENOUS BLD VENIPUNCTURE: CPT

## 2023-02-06 PROCEDURE — G0378 HOSPITAL OBSERVATION PER HR: HCPCS

## 2023-02-06 PROCEDURE — 93306 TTE W/DOPPLER COMPLETE: CPT

## 2023-02-06 PROCEDURE — 93306 TTE W/DOPPLER COMPLETE: CPT | Performed by: STUDENT IN AN ORGANIZED HEALTH CARE EDUCATION/TRAINING PROGRAM

## 2023-02-06 PROCEDURE — 83880 ASSAY OF NATRIURETIC PEPTIDE: CPT

## 2023-02-06 PROCEDURE — 94761 N-INVAS EAR/PLS OXIMETRY MLT: CPT

## 2023-02-06 PROCEDURE — 97165 OT EVAL LOW COMPLEX 30 MIN: CPT

## 2023-02-06 PROCEDURE — 80061 LIPID PANEL: CPT

## 2023-02-06 PROCEDURE — 92610 EVALUATE SWALLOWING FUNCTION: CPT

## 2023-02-06 PROCEDURE — 97161 PT EVAL LOW COMPLEX 20 MIN: CPT

## 2023-02-06 PROCEDURE — 83036 HEMOGLOBIN GLYCOSYLATED A1C: CPT

## 2023-02-06 PROCEDURE — 97535 SELF CARE MNGMENT TRAINING: CPT

## 2023-02-06 PROCEDURE — 77010033678 HC OXYGEN DAILY

## 2023-02-06 PROCEDURE — 74011250637 HC RX REV CODE- 250/637: Performed by: HOSPITALIST

## 2023-02-06 PROCEDURE — 74011250636 HC RX REV CODE- 250/636: Performed by: HOSPITALIST

## 2023-02-06 PROCEDURE — 97116 GAIT TRAINING THERAPY: CPT

## 2023-02-06 RX ADMIN — FUROSEMIDE 40 MG: 10 INJECTION, SOLUTION INTRAMUSCULAR; INTRAVENOUS at 08:45

## 2023-02-06 RX ADMIN — DULOXETINE HYDROCHLORIDE 30 MG: 30 CAPSULE, DELAYED RELEASE ORAL at 08:45

## 2023-02-06 RX ADMIN — PANTOPRAZOLE SODIUM 40 MG: 40 TABLET, DELAYED RELEASE ORAL at 08:45

## 2023-02-06 RX ADMIN — METOPROLOL SUCCINATE 25 MG: 25 TABLET, EXTENDED RELEASE ORAL at 08:45

## 2023-02-06 RX ADMIN — TRAMADOL HYDROCHLORIDE 50 MG: 50 TABLET ORAL at 02:11

## 2023-02-06 RX ADMIN — AMLODIPINE BESYLATE 5 MG: 5 TABLET ORAL at 08:45

## 2023-02-06 RX ADMIN — ASPIRIN 81 MG: 81 TABLET, CHEWABLE ORAL at 08:45

## 2023-02-06 NOTE — ED NOTES
Bedside and Verbal shift change report given to Elaina Vines (oncoming nurse) by Junior Benjamin (offgoing nurse).  Report included the following information SBAR, Kardex, ED Summary, Intake/Output, MAR, Recent Results, and Cardiac Rhythm SR.

## 2023-02-06 NOTE — CONSULTS
[facility] and [address] at [facility] at [address] [place] [address], [address] [place], [place] [id number] [address]  [phone] Office  [phone] Facsimile           Referring: Dr. Eri Menjivar    Chief Complaint   Patient presents with    Chest Pain    Jaw Pain    Neck Pain   We are asked to see this 79-year-old lady in neurologic consultation Regarding an acute change in her neurologic status manifest as strokelike symptoms. She presented to the emergency department noting numbness of the left upper extremity as well as left-sided headache. Her examination in the emergency department found her to be normotensive. 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 Her daughter-in-law was with her when the event happened. Patient noted she was having pain in the left side of the neck and then she had numbness in the left upper extremity encompassing all fingers. She had no weakness. No numbness in the face trunk or leg. She is able to ambulate. No other focal deficits. Still has some discomfort in the neck. She also had pain in the jaw when this occurred. She was worried about a stroke or an MI so she came to the hospital.  Since being here her family tells me she was diagnosed with pulmonary hypertension. Cardiac evaluation is ongoing.   She does have a history of cervical surgery in the past .    Past Medical History: Diagnosis Date    Current smoker     4 cig pd    Depression 7/15/2010    GERD (gastroesophageal reflux disease) 7/15/2010    High cholesterol 7/15/2010    HTN (hypertension)     Obesity (BMI 30-39.9)     ALESIA (obstructive sleep apnea) 7/15/2010    Osteopenia 7/15/2010    Pseudogout 7/15/2010       Past Surgical History:   Procedure Laterality Date    ENDOSCOPY, COLON, DIAGNOSTIC  2006    Dr. Delaney Hunt  late 20's    JESSICA, one ovary intact, due to bleeding    HX ORTHOPAEDIC      right wrist and neck    HX UROLOGICAL  2011    Bladder sling - Dr. Zuleyma Calvin       Current Facility-Administered Medications   Medication Dose Route Frequency Provider Last Rate Last Admin    amLODIPine (NORVASC) tablet 5 mg  5 mg Oral DAILY Mary Quinones MD   5 mg at 02/06/23 0845    aspirin chewable tablet 81 mg  81 mg Oral DAILY Mary Quinones MD   81 mg at 02/06/23 0845    DULoxetine (CYMBALTA) capsule 30 mg  30 mg Oral DAILY Mary Quinones MD   30 mg at 02/06/23 0845    fluticasone propionate (FLONASE) 50 mcg/actuation nasal spray 2 Spray  2 Spray Both Nostrils DAILY Mary Spencer MD        metoprolol succinate (TOPROL-XL) XL tablet 25 mg  25 mg Oral DAILY Mary Quinones MD   25 mg at 02/06/23 0845    pantoprazole (PROTONIX) tablet 40 mg  40 mg Oral ACB Mary Spencer MD   40 mg at 02/06/23 0845    traMADoL (ULTRAM) tablet 50 mg  50 mg Oral Q6H PRN Yovani Mendez MD   50 mg at 02/06/23 0211    acetaminophen (TYLENOL) tablet 650 mg  650 mg Oral Q4H PRN Yovani Mendez MD        Or    acetaminophen (TYLENOL) solution 650 mg  650 mg Per NG tube Q4H PRN Mary Spencer MD        Or    acetaminophen (TYLENOL) suppository 650 mg  650 mg Rectal Q4H PRN Mary Spencer MD        enoxaparin (LOVENOX) injection 40 mg  40 mg SubCUTAneous Q24H Mary Spencer MD   40 mg at 02/05/23 2348    furosemide (LASIX) injection 40 mg  40 mg IntraVENous Q12H Yovani Mendez MD   40 mg at 02/06/23 0845 Current Outpatient Medications   Medication Sig Dispense Refill    amLODIPine (NORVASC) 5 mg tablet Take 5 mg by mouth daily. omega 3-dha-epa-fish oil (Fish OiL) 100-160-1,000 mg cap Take  by mouth.      metoprolol succinate (TOPROL-XL) 25 mg XL tablet Take 1 Tab by mouth daily. 30 Tab 0    losartan (COZAAR) 100 mg tablet TAKE 1 TABLET BY MOUTH DAILY. 90 Tab 3    cpap machine kit by Does Not Apply route. omeprazole (PRILOSEC) 40 mg capsule Take 40 mg by mouth two (2) times a day. bumetanide (BUMEX) 0.5 mg tablet Take  by mouth daily. calcium carbonate/vitamin D3 (CALCIUM + D PO) Take 1,200 mg by mouth daily. docusate sodium (DOK) 250 mg capsule Take 250 mg by mouth daily. DULoxetine (CYMBALTA) 30 mg capsule Take 1 Cap by mouth daily. 90 Cap 3    traMADol (ULTRAM) 50 mg tablet TAKE 1 TABLET BY MOUTH EVERY 6 HRS AS NEEDED FOR PAIN 45 Tab 1    fluticasone (FLONASE) 50 mcg/actuation nasal spray INHALE 2 PUFFS INTO EACH NOSTRIL DAILY 1 Bottle 11    cholecalciferol (VITAMIN D3) 1,000 unit cap Take 2,000 Units by mouth daily. acetaminophen (TYLENOL) 650 mg TbER Take 650 mg by mouth four (4) times daily. aspirin 81 mg chewable tablet Take 1 Tab by mouth daily. 100 Tab 11    MULTIVITAMINS (MULTIPLE VITAMIN PO) Take  by mouth. Allergies   Allergen Reactions    Bactrim [Sulfamethoprim] Swelling    Gabapentin Other (comments)     headache    Lipitor [Atorvastatin] Other (comments)     Aches      Roxicodone [Oxycodone] Swelling    Tetanus-Diphtheria Toxoids-Td Swelling     Allergic to Td? Welchol [Colesevelam] Other (comments)     Aches         Social History     Tobacco Use    Smoking status: Former     Packs/day: 0.50     Types: Cigarettes     Quit date: 2010     Years since quittin.4    Smokeless tobacco: Never   Substance Use Topics    Alcohol use: No     Alcohol/week: 0.8 standard drinks     Types: 1 Glasses of wine per week     Comment: RARE    Drug use:  No Family History   Problem Relation Age of Onset    Diabetes Father     Arthritis-rheumatoid Father     Heart Disease Father     Cancer Maternal Aunt     Breast Cancer Maternal Aunt     Cancer Other     Breast Cancer Sister 58    Breast Cancer Maternal Aunt        Review of Systems  Pertinent positives and negatives as noted. Examination  Visit Vitals  BP (!) 130/53 (BP 1 Location: Right lower arm, BP Patient Position: At rest)   Pulse 71   Temp 98.5 °F (36.9 °C)   Resp 16   Ht 5' 2\" (1.575 m)   Wt 92.4 kg (203 lb 12.8 oz)   LMP 05/19/1989   SpO2 93%   BMI 37.28 kg/m²     Neurologically, she is awake, alert, and oriented with normal speech and language. Her cognition is normal.    Intact cranial nerves 2-12. No nystagmus. She has normal bulk and tone. She has no abnormal movement. She has no pronation or drift. She generates full strength in the upper and lower extremities to direct confrontational testing. Reflexes are symmetrical in the upper and lower extremities bilaterally. No pathologic reflexes are elicited. Finger nose finger and rapid alternating movements are normal.      Impression/Plan  Very pleasant 80-year-old lady with an episode of neck pain and left upper extremity numbness and this really sounds more of a radicular type process  Her exam is reassuring  Her imaging is also reassuring  If she has persistent symptoms I have left our clinic information on her discharge paperwork and she can call and arrange an EMG with one of our neuromuscular experts and then follow-up after    Trinh Porras MD          This note was created using voice recognition software. Despite editing, there may be syntax errors.

## 2023-02-06 NOTE — ED NOTES
I have reviewed discharge instructions with the patient and caregiver. The patient and caregiver verbalized understanding. Patient leaves ED ambulatory and in no acute distress.

## 2023-02-06 NOTE — PROGRESS NOTES
TRANSFER - IN REPORT:    Verbal report received from Mercedes(name) on Nanette Sutherland  being received from ED(unit) for routine progression of care      Report consisted of patients Situation, Background, Assessment and   Recommendations(SBAR). Information from the following report(s) SBAR, Kardex, ED Summary, OR Summary, Procedure Summary, Intake/Output, MAR, Recent Results, and Cardiac Rhythm NSR  was reviewed with the receiving nurse. Opportunity for questions and clarification was provided. Assessment completed upon patients arrival to unit and care assumed.

## 2023-02-06 NOTE — THERAPY EVALUATION
PHYSICAL THERAPY EVALUATION/DISCHARGE  Patient: Vickie Melgar ([de-identified] y.o. female)  Date: 2/6/2023  Primary Diagnosis: Left sided numbness [R20.0]       Precautions: fall           ASSESSMENT  Based on the objective data described below, the patient presents with left arm numbness that has now resolved. CT and MRI are negative for an acute infarct. Patient able to ambulate in hallway with premorbid assistive device of SPC without loss of balance or instability. O2 sats on room air 89-90% with activity. Patient is at her baseline for mobility, no further PT needs. Functional Outcome Measure: The patient scored Total: 27/56 on the Caputo Balance Assessment which is indicative of moderate fall risk- recommending continued use of assistive device at discharge. Other factors to consider for discharge:      Further skilled acute physical therapy is not indicated at this time. PLAN :  Recommendation for discharge: (in order for the patient to meet his/her long term goals)  No skilled physical therapy/ follow up rehabilitation needs identified at this time.     This discharge recommendation:  Has been made in collaboration with the attending provider and/or case management    IF patient discharges home will need the following DME: none         SUBJECTIVE:   Patient stated .    OBJECTIVE DATA SUMMARY:   HISTORY:    Past Medical History:   Diagnosis Date    Current smoker     4 cig pd    Depression 7/15/2010    GERD (gastroesophageal reflux disease) 7/15/2010    High cholesterol 7/15/2010    HTN (hypertension)     Obesity (BMI 30-39.9)     ALESIA (obstructive sleep apnea) 7/15/2010    Osteopenia 7/15/2010    Pseudogout 7/15/2010     Past Surgical History:   Procedure Laterality Date    ENDOSCOPY, COLON, DIAGNOSTIC  2006    Dr. Staci Singer  late 19's    JESSICA, one ovary intact, due to bleeding    HX ORTHOPAEDIC      right wrist and neck    HX UROLOGICAL  2011    Bladder sling - Dr. Aries Cheng       Prior level of function: see above  Personal factors and/or comorbidities impacting plan of care: see below    [address]: Private residence  # Steps to Enter: 4  Rails to Enter: Yes  One/Two Story Residence: One story  Living Alone: No  Support Systems: Child(tonia)  Patient Expects to be Discharged to[de-identified] Home  Current DME Used/Available at Home: Cane, straight, Grab bars, CPAP, Shower chair  Tub or Shower Type: Tub/Shower combination    EXAMINATION/PRESENTATION/DECISION MAKING:   Vitals:    02/06/23 1000 02/06/23 1136 02/06/23 1140 02/06/23 1146   BP: (!) 154/69 (!) 130/45  (!) 172/48   BP 1 Location:  Left upper arm  Right upper arm   BP Patient Position:  At rest Walking Comment: post activity   Pulse: 69 75 99 73   Temp:       Resp:       Height:       Weight:       SpO2: 98%  90% 95%         Critical Behavior:  Neurologic State: Alert, Appropriate for age  Orientation Level: Oriented X4  Cognition: Follows commands  Safety/Judgement: Awareness of environment  Hearing: Auditory  Auditory Impairment: Hearing aid(s)  Skin:  all exposed intact  Edema:   Range Of Motion:  AROM: Within functional limits                       Strength:    Strength:  Within functional limits                    Tone & Sensation:   Tone: Normal              Sensation: Intact               Coordination:  Coordination: Within functional limits  Vision:   Corrective Lenses: Glasses  Functional Mobility:  Bed Mobility:     Supine to Sit: Independent  Sit to Supine: Independent  Scooting: Independent  Transfers:  Sit to Stand: Modified independent  Stand to Sit: Modified independent        Bed to Chair: Modified independent              Balance:   Sitting: Intact  Standing: Intact  Ambulation/Gait Training:  Distance (ft): 200 Feet (ft)  Assistive Device: Gait belt;Cane, straight  Ambulation - Level of Assistance: Modified independent        Gait Abnormalities: Decreased step clearance                   Functional Measure  Caputo Balance Test:    Sitting to Standin  Standing Unsupported: 4  Sitting with Back Unsupported: 4  Standing to Sitting: 3  Transfers: 3  Standing Unsupported with Eyes Closed: 3  Standing Unsupported with Feet Together: 0  Reach Forward with Outstretched Arm: 1   Object: 3  Turn to Look Over Shoulders: 1  Turn 360 Degrees: 1  Alternate Foot on Step/Stool: 0  Standing Unsupported One Foot in Front: 0  Stand on One Le  Total: 27/56         56=Maximum possible score;   0-20=High fall risk  21-40=Moderate fall risk   41-56=Low fall risk        Physical Therapy Evaluation Charge Determination   History Examination Presentation Decision-Making   MEDIUM  Complexity : 1-2 comorbidities / personal factors will impact the outcome/ POC  LOW Complexity : 1-2 Standardized tests and measures addressing body structure, function, activity limitation and / or participation in recreation  LOW Complexity : Stable, uncomplicated  CARRANZA      Based on the above components, the patient evaluation is determined to be of the following complexity level: LOW     Pain Rating:  None reported    Activity Tolerance:   Good    After treatment patient left in no apparent distress:   Call bell within reach, Caregiver / family present, and in ER stretcher    COMMUNICATION/EDUCATION:   The patients plan of care was discussed with: Occupational therapist and Registered nurse. Patient was educated regarding her deficit(s) of transient arm numbness as this relates to her diagnosis of TIA. She demonstrated Good understanding as evidenced by verbal feedback. Patient and/or family was verbally educated on the BE FAST acronym for signs/symptoms of CVA and TIA. Informed patient to refer to the Stroke Binder for further BE FAST information. All questions answered with patient indicating good understanding.      Fall prevention education was provided and the patient/caregiver indicated understanding., Patient/family have participated as able in goal setting and plan of care. , and Patient/family agree to work toward stated goals and plan of care.     Thank you for this referral.  Corrinne Piggs, PT, DPT   Time Calculation: 23 mins

## 2023-02-06 NOTE — ED NOTES
Verbal shift change report given to EMILIA eastman (oncoming nurse) by Lara Mitchell RN (offgoing nurse). Report included the following information SBAR, ED Summary, MAR, Recent Results, and Med Rec Status.

## 2023-02-06 NOTE — PROGRESS NOTES
SPEECH PATHOLOGY BEDSIDE SWALLOW EVALUATION/DISCHARGE  Patient: Angel Talavera ([de-identified] y.o. female)  Date: 2/6/2023  Primary Diagnosis: Left sided numbness [R20.0]       Precautions:        ASSESSMENT :  Based on the objective data described below, the patient presents with [facility] oral/pharyngeal swallow. Patient with throat clearing throughout session, both with and without PO intake, therefore doubt related to aspiration. Note patient admitted with sensation changes with concern for CVA, however MRI showed Chronic white matter disease and remote hemorrhagic left caudate infarction, with no acute process. Per chart review, patient reported getting strangled with foods and liquids since September. Upon further questioning, patient reported that she also gets strangled on her saliva. Discussed general aspiration precautions as below and patient verbalized understanding. Note CTA Chest showed No evidence of acute process in the chest.  Skilled acute therapy provided by a speech-language pathologist is not indicated at this time. PLAN :  Recommendations:  -Regular/thin liquid diet, eliminate distractions with PO intake, small/single bites and sips, slow rate  Discharge Recommendations: None     SUBJECTIVE:   Patient stated I even get strangled on my saliva. \" Patient Ox4. Denied decline in motor speech, language, or cognitive function.     OBJECTIVE:     Past Medical History:   Diagnosis Date    Current smoker     4 cig pd    Depression 7/15/2010    GERD (gastroesophageal reflux disease) 7/15/2010    High cholesterol 7/15/2010    HTN (hypertension)     Obesity (BMI 30-39.9)     LAESIA (obstructive sleep apnea) 7/15/2010    Osteopenia 7/15/2010    Pseudogout 7/15/2010     Past Surgical History:   Procedure Laterality Date    ENDOSCOPY, COLON, DIAGNOSTIC  2006    Dr. Pincus Severance GYN  late 20's    JESSICA, one ovary intact, due to bleeding    HX ORTHOPAEDIC      right wrist and neck    HX UROLOGICAL  2011    Bladder sling -  Juliette Wooten     Prior Level of Function/Home Situation:   Home Situation  Support Systems: Child(tonia)  Patient Expects to be Discharged to[de-identified] Home  Diet prior to admission: regular/thin  Current Diet:  regular/thin   Cognitive and Communication Status:  Neurologic State: Alert, Appropriate for age  Orientation Level: Oriented X4  Cognition: Follows commands           Oral Assessment:  Oral Assessment  Labial: No impairment  Dentition: Upper dentures; Natural  Oral Hygiene: moist  Lingual: No impairment  Velum: No impairment  Mandible: No impairment  P.O. Trials:  Patient Position: up in bed  Vocal quality prior to P.O.: No impairment  Consistency Presented: Thin liquid; Solid  How Presented: Self-fed/presented;Straw;Successive swallows     Bolus Acceptance: No impairment  Bolus Formation/Control: No impairment     Propulsion: No impairment           Aspiration Signs/Symptoms: Clear throat (however this occurred prior to PO as well)  Pharyngeal Phase Characteristics: No impairment, issues, or problems              Oral Phase Severity: No impairment  Pharyngeal Phase Severity : No impairment  NOMS:   The NOMS functional outcome measure was used to quantify this patient's level of swallowing impairment. Based on the NOMS, the patient was determined to be at level 7 for swallow function     NOMS Swallowing Levels:  Level 1 (CN): NPO  Level 2 (CM): NPO but takes consistency in therapy  Level 3 (CL): Takes less than 50% of nutrition p.o. and continues with nonoral feedings; and/or safe with mod cues; and/or max diet restriction  Level 4 (CK): Safe swallow but needs mod cues; and/or mod diet restriction; and/or still requires some nonoral feeding/supplements  Level 5 (CJ): Safe swallow with min diet restriction; and/or needs min cues  Level 6 (CI): Independent with p.o.; rare cues; usually self cues; may need to avoid some foods or needs extra time  Level 7 ([address] [place] [address]): Independent for all p.o.  AILYN. (2003).  National Outcomes Measurement System (NOMS): Adult Speech-Language Pathology User's Guide. Pain:  Pain Scale 1: Numeric (0 - 10)  Pain Intensity 1: 0  Pain Location 1: Neck  After treatment:   Patient left in no apparent distress in bed, Call bell within reach, Nursing notified, and Caregiver / family present    COMMUNICATION/EDUCATION:   Patient was educated regarding her deficit(s) of WFL swallow as this relates to her diagnosis. She demonstrated good understanding as evidenced by verbalizing understanding. The patient's plan of care including recommendations, planned interventions, and recommended diet changes were discussed with: Registered nurse, PT, and OT.      Thank you for this referral.  Iban Butt, HAIDER  Time Calculation: 10 mins

## 2023-02-06 NOTE — ED NOTES
1925 - Bedside and Verbal shift change report given to [address] [place] [address] (oncoming nurse) by Zahraa Acevedo (offgoing nurse). Report included the following information SBAR, Kardex, ED Summary, Intake/Output, and MAR. Pt resting in bed - pt's daughter at side for comfort and care - remains on cardiac monitor x4, low POX on room air noted at this time, hx of ALESIA, will applied oxygen during HS - HTN noted on monitor - NIH score of 1 at this time, (L) arm and (L) LE with decreased sensation - no other neurological decifits noted at this time - no active pain at this time - speaking in full sentences;;    0049 - Bedside and Verbal shift change report given to Brenden Peterson RN (oncoming nurse) by [address] [place] [address] (offgoing nurse). Report included the following information SBAR, Kardex, ED Summary, Intake/Output, and MAR.

## 2023-02-06 NOTE — PROGRESS NOTES
Orders received, chart reviewed and patient evaluated by physical therapy. Pending progression with skilled acute physical therapy, recommend:  No skilled physical therapy/ follow up rehabilitation needs identified at this time. Recommend with nursing OOB to chair 3x/day and walking daily and cane. Thank you for completing as able in order to maintain patient strength, endurance and independence. Full evaluation to follow.       Hilaria Jimenez PT,DPT,NCS

## 2023-02-06 NOTE — DISCHARGE SUMMARY
Earle Murray [facility] 79  [address] [facility], [place], [address] [place] [address]  [phone]    [address] Adult  Hospitalist Group     Discharge Summary       PATIENT ID: Angela Copeland  MRN: [id number]   YOB: 1940    DATE OF ADMISSION: 2/5/2023  3:38 PM    DATE OF DISCHARGE: 02/06/23   PRIMARY CARE PROVIDER: Estela Byers MD     DISCHARGING PROVIDER: Randolph Lacy MD      CONSULTATIONS: IP CONSULT TO NEUROLOGY    PROCEDURES/SURGERIES: * No surgery found *    [address] COURSE:     [de-identified]year old female presented with left arm numbness and left sided neck pain. She was admitted for CVA rule out. Her work-up thus far has included:  Dry CT unremarkable  CTA of the head and neck unremarkable  CT perfusion unremarkable  CTA of the chest with no evidence of PE  MRI of the brain unremarkable with age-related changes and old left caudate infarct  Films personally reviewed  Lipid panel with an LDL of 125  Hemoglobin A1c 5.4  Coags unremarkable    PT/OT/SLP also evaluted patient and had no recommendations.  Neurology recommends outpatient follow up for EMG if symptoms continue to persist.       PENDING TEST RESULTS:   At the time of discharge the following test results are still pending: none    FOLLOW UP APPOINTMENTS:    Follow-up Information       Follow up With Specialties Details Why Contact Info    James Onofre MD Neurology Follow up If you continue to have numbness in the left upper extremity and the neck pain call our office and schedule an EMG of the upper extremities and a follow-up visit afterward [address] [place] [address]      Masha Saab 134  [phone]                 DIET: regular    ACTIVITY: as tolerated       DISCHARGE MEDICATIONS:  Current Discharge Medication List        CONTINUE these medications which have NOT CHANGED Details   amLODIPine (NORVASC) 5 mg tablet Take 5 mg by mouth daily. omega 3-dha-epa-fish oil (Fish OiL) 100-160-1,000 mg cap Take  by mouth.      metoprolol succinate (TOPROL-XL) 25 mg XL tablet Take 1 Tab by mouth daily. Qty: 30 Tab, Refills: 0    Comments: Appointment required for further refills. losartan (COZAAR) 100 mg tablet TAKE 1 TABLET BY MOUTH DAILY. Qty: 90 Tab, Refills: 3    Associated Diagnoses: Essential hypertension with goal blood pressure less than 140/90; Essential hypertension      cpap machine kit by Does Not Apply route. omeprazole (PRILOSEC) 40 mg capsule Take 40 mg by mouth two (2) times a day. bumetanide (BUMEX) 0.5 mg tablet Take  by mouth daily. calcium carbonate/vitamin D3 (CALCIUM + D PO) Take 1,200 mg by mouth daily. docusate sodium (DOK) 250 mg capsule Take 250 mg by mouth daily. DULoxetine (CYMBALTA) 30 mg capsule Take 1 Cap by mouth daily. Qty: 90 Cap, Refills: 3    Associated Diagnoses: Other chronic pain; Dysthymia      traMADol (ULTRAM) 50 mg tablet TAKE 1 TABLET BY MOUTH EVERY 6 HRS AS NEEDED FOR PAIN  Qty: 45 Tab, Refills: 1    Comments: Not to exceed 4 additional fills before 08/06/2018  Associated Diagnoses: Primary osteoarthritis of right knee      fluticasone (FLONASE) 50 mcg/actuation nasal spray INHALE 2 PUFFS INTO EACH NOSTRIL DAILY  Qty: 1 Bottle, Refills: 11    Associated Diagnoses: Allergic rhinitis, cause unspecified      cholecalciferol (VITAMIN D3) 1,000 unit cap Take 2,000 Units by mouth daily. acetaminophen (TYLENOL) 650 mg TbER Take 650 mg by mouth four (4) times daily. aspirin 81 mg chewable tablet Take 1 Tab by mouth daily. Qty: 100 Tab, Refills: 11    Associated Diagnoses: Unspecified essential hypertension      MULTIVITAMINS (MULTIPLE VITAMIN PO) Take  by mouth. NOTIFY YOUR PHYSICIAN FOR ANY OF THE FOLLOWING:   Fever over 101 degrees for 24 hours.    Chest pain, shortness of breath, fever, chills, nausea, vomiting, diarrhea, change in mentation, falling, weakness, bleeding. Severe pain or pain not relieved by medications. Or, any other signs or symptoms that you may have questions about. DISPOSITION:   x Home With:   OT  PT  HH  RN       Long term SNF/Inpatient Rehab    Independent/assisted living    Hospice    Other:         PHYSICAL EXAMINATION AT DISCHARGE:  General:          Alert, cooperative, no distress, appears stated age. HEENT:           Atraumatic, anicteric sclerae, pink conjunctivae                          No oral ulcers, mucosa moist, throat clear, dentition fair  Neck:               Supple, symmetrical  Lungs:             Clear to auscultation bilaterally. No Wheezing or Rhonchi. No rales. Heart:              Regular  rhythm,  No  murmur   No edema  Abdomen:        Soft, non-tender. Not distended. Bowel sounds normal  Extremities:     No cyanosis. No clubbing,                            Skin turgor normal, Capillary refill normal  Skin:                Not pale. Not Jaundiced  No rashes   Psych:             Not anxious or agitated. Neurologic:      Alert, moves all extremities, answers questions appropriately and responds to commands       CHRONIC MEDICAL DIAGNOSES:  Problem List as of 2/6/2023 Date Reviewed: 1/10/2023            Codes Class Noted - Resolved    Left sided numbness ICD-10-CM: R20.0  ICD-9-CM: 782.0  2/5/2023 - Present        Peripheral vascular disease ([facility].) ICD-10-CM: I73.9  ICD-9-CM: 443.9  10/15/2019 - Present        DDD (degenerative disc disease), lumbar ICD-10-CM: M51.36  ICD-9-CM: 722.52  5/8/2018 - Present    Overview Signed 5/8/2018  1:57 PM by Yessy Murray     MRI 2018:  1. L2-3 and L4-5 minimal central spinal canal stenosis is new. 2. Increased subtle levoscoliosis since 2007.     S/p ZEENAT Dr. Geovanni Torrez  Also sees Dr. Melchor Vega             Obesity, morbid [facility]) ICD-10-CM: E66.01  ICD-9-CM: 278.01  12/19/2017 - Present    Overview Signed 12/19/2017  9:37 AM by Yesenia Bob     Letter 2017             FH: breast cancer in first degree relative ICD-10-CM: Z80.3  ICD-9-CM: V16.3  2017 - Present    Overview Signed 2017  9:49 AM by Yesenia Bob     sister             Allergic to tetanus vaccine ICD-10-CM: Z88.7  ICD-9-CM: V14.7  2016 - Present    Overview Signed 2016 10:05 AM by Alvin Joyner NOT indicated             Hypercalcemia ICD-10-CM: P75.71  ICD-9-CM: 275.42  2016 - Present    Overview Signed 2016  5:16 PM by Yesenia Bob     Normal PTH              Anxiety ICD-10-CM: F41.9  ICD-9-CM: 300.00  2015 - Present    Overview Addendum 2018  3:09 PM by Yesenia Bob     Should be seen twice yearly to document use of BNZ  FTF 2018   as expected:  2016, 2017, 2018:  PHQ9 = 3, PHQ9 = 0 2018             Urinary incontinence ICD-10-CM: R32  ICD-9-CM: 788.30  2014 - Present    Overview Signed 2014 10:07 AM by Chloe Mcduffie, will be seeing Dr. Thanh james discussed with patient ICD-10-CM: Z71.89  ICD-9-CM: V65.49  10/7/2014 - Present    Overview Addendum 2017  9:34 AM by Yesenia Bob     Discussed 2017  Full code             Health care maintenance ICD-10-CM: Z00.00  ICD-9-CM: V70.0  10/7/2014 - Present    Overview Addendum 2017  9:43 AM by Mars Wood, [id number] [address] [facility] 2017      Female exams: JESSICA/one ovary intact, done for bleeding,   Mammogram:   Vitamin D level:   DEXA:  osteoporosis  Last PAP: No longer getting      Colonoscopy:  Dr. Erika Monsalve   FOBT:   Glaucoma screen: Dr. Jesus Jarquin  Tdap: Cannot get  Pneumovax: 2008  PCV13 done 2016  Flu shot: 2016  Zostavax: Needs   EKG documentation: , repeat 2014  Advanced Directives: discussed full code  Face to face meeting to document need for DME, home health, therapy: done: none  Updated specialist: done, see problem list  FTF for BNZ:   as expected 12/6/2016  Endo Dr. Linnie Severs Dr. Olena Glad  OP Dr. Murray [place]: M81.0  ICD-9-CM: 733.00  10/15/2012 - Present    Overview Addendum 12/19/2017  9:43 AM by Bradley Meier 2002>>>2007>>>2008  Dexa 2008 showed worse BMD (Z-score = -1.2)  Dexa 2012:  Z= -1.2  Dexa 2015 stable  Dr. Armando Moran  She tried Prolia but did not tolerate it             Lipid disorder ICD-10-CM: E78.9  ICD-9-CM: 272.9  10/10/2012 - Present    Overview Signed 12/7/2016  7:42 AM by Edwige Miller     She has not tolerated statins             Knee pain, right ICD-10-CM: M25.561  ICD-9-CM: 719.46  10/9/2012 - Present    Overview Addendum 12/19/2017  9:36 AM by Edwige Miller     Surgery for lateral meniscus 2012  Dr. Evita Taylor  Did not improve with PT             Osteoarthritis ICD-10-CM: M19.90  ICD-9-CM: 715.90  10/9/2012 - Present    Overview Addendum 12/19/2017  9:52 AM by Edwige Miller     Right knee surgery 2011 Dr. Evita Taylor             Hyperglycemia ICD-10-CM: R73.9  ICD-9-CM: 790.29  3/15/2012 - Present    Overview Addendum 10/27/2014  8:12 AM by Edwige Miller     Fasting glucose > 126 on 3/2012 (143)  GTT did not confirm DM 3/2012  A1c 5.9 3/2012  GTT normal 2014             Chest pain ICD-10-CM: R07.9  ICD-9-CM: 786.50  3/17/2011 - Present    Overview Signed 3/17/2011  3:16 PM by Edwige Miller     Normal Stress Echo 8/10/09  Dr. Nan Santo             History of normal resting EKG ICD-10-CM: Donavan Peeling  ICD-9-CM: V49.89  3/17/2011 - Present    Overview Signed 3/6/2012  3:37 PM by Edwige Miller     See scan from 3/6/2012             Colon polyp ICD-10-CM: K63.5  ICD-9-CM: 211.3  3/17/2011 - Present    Overview Addendum 12/19/2017  7:50 AM by Edwige Curtis  Colonoscopy 2006, 2008, 2011, 2017, next 2022             S/P [facility] ICD-10-CM: Z90.710, H45.415, Z90.79  ICD-9-CM: V88.01  3/17/2011 - Present    Overview Addendum 7/2/2013  9:31 AM by Shannon Harvey     Due to bleeding in her 21's  One ovary intact  Never had abnormal pap, no cervix left  No more paps             Essential hypertension with goal blood pressure less than 140/90 ICD-10-CM: I10  ICD-9-CM: 401.9  3/17/2011 - Present        Pseudogout ICD-10-CM: M11.20  ICD-9-CM: 275.49, 712.20  7/15/2010 - Present        Depression ICD-10-CM: F32. A  ICD-9-CM: 780  7/15/2010 - Present    Overview Signed 12/19/2017  9:36 AM by Moraima Kernsman = 1 12/19/2017             GERD (gastroesophageal reflux disease) ICD-10-CM: K21.9  ICD-9-CM: 530.81  7/15/2010 - Present    Overview Signed 3/17/2011  3:21 PM by Shannon Harvey     EGD 2008 normal  Dr. Carlos Eduardo Adkins             ALESIA (obstructive sleep apnea) ICD-10-CM: S38.85  ICD-9-CM: 327.23  7/15/2010 - Present    Overview Addendum 12/19/2017  7:52 AM by Elza Diane V     C-pap 6 cm H2O/autopap.   Updated 2/8/2017 (see scan)  Sleep study 10/2007  FTF 12/19/2017             RESOLVED: Allergic reaction ICD-10-CM: T78.40XA  ICD-9-CM: 995.3  3/22/2012 - 3/23/2012        RESOLVED: High cholesterol ICD-10-CM: E78.00  ICD-9-CM: 272.0  7/15/2010 - 10/10/2012        RESOLVED: Osteopenia ICD-10-CM: M85.80  ICD-9-CM: 733.90  7/15/2010 - 10/15/2012    Overview Addendum 3/17/2011  3:20 PM by Keara Miller 2002>>>2007>>>2008  Dexa 2008 showed worse BMD (Z-score = -1.2)                Greater than 30 minutes were spent with the patient on counseling and coordination of care    Signed:   Bernard Clay MD  2/6/2023  4:26 PM

## 2023-02-06 NOTE — PROGRESS NOTES
CARE MANAGEMENT INITIAL ASSESSEMENT      NAME:   Reinier Vines   :     1940   MRN:     [id number]       Emergency Contact:  Extended Emergency Contact Information  Primary Emergency Contact: Stephanie Vivar Phone: [phone]  Relation: Child  Secondary Emergency Contact: Livia Núñez  Mobile Phone: [phone]  Relation: Daughter    Advance Directive:  Full Code, does not have an advance directive. Morgan Vazquez [facility] Decision Maker:   Patito Sanjay- son- [phone]         Eleopal Morrow daughter- [phone]    Reason for Admission:  Ms. London Cleaning is a [de-identified] y.o. female with history that includes depression, HTN, GERD, and ALESIA   who was emergently admitted for:  left arm numbness    Patient Active Problem List   Diagnosis Code    Pseudogout M11.20    Depression F32. A    GERD (gastroesophageal reflux disease) K21.9    ALESIA (obstructive sleep apnea) G47.33    Chest pain R07.9    History of normal resting EKG [id number]    Colon polyp K63.5    S/P JESSICA-BSO Z90.710, Z90.722, Z90.79    Essential hypertension with goal blood pressure less than 140/90 I10    Hyperglycemia R73.9    Knee pain, right M25.561    Osteoarthritis M19.90    Lipid disorder E78.9    Osteoporosis M81.0    Advance directive discussed with patient Z71.89    Health care maintenance Z00.00    Urinary incontinence R32    Anxiety F41.9    Hypercalcemia E83.52    Allergic to tetanus vaccine Z88.7    Obesity, morbid (HCC) E66.01    FH: breast cancer in first degree relative Z80.3    DDD (degenerative disc disease), lumbar M51.36    Peripheral vascular disease (HCC) I73.9    Left sided numbness R20.0       Assessment: In person with patient, son Genesis Romero), and daughter Hannah Srivastava) .       RUR:  N/A OBS  Risk Level:  N/A  Value-based purchasing:   No  Bundle patient:  No    Residency:  Private residence  Exterior Steps:   3-4  Interior Steps:  None    Lives With:  Adult children Hannah Atif)    Prior functioning:  Independent. Patient requires assistance with:  N/A    Prior DME required:  Cane, Grab bars, and CPAP    DME available:  Same as above    Rehab history:  None    Discharge Concerns/Barriers Identified:  None identified      Insurer:  Payor: Will Mana / Plan: [address] [place] [address] HMO / Product Type: Managed Care Medicare /     Medicare Outpatient Observation Notice (MOON)/ [facility] Outpatient Observation Notice (Irven Jackie) provided to patient/representative with verbal explanation of the notice. Time allotted for questions regarding the notice. Patient /representative provided a completed copy of the MOON/VOON notice. Copy placed on bedside chart. PCP: Kobi Miller MD   [facility] Practice[facility]   Current patient: Yes   Approximate date of last visit: 12/23/22   Access to virtual PCP visits:  Unknown    Pharmacy:  [address] [place] [address]   Financial/Difficulty affording medications:  None identified    COVID-19 vaccination status:  Yes    DC Transport:  Family      Transition of care plan:  Home with outpatient follow-up     Comments:   Pt admitted as OBS on 02/05/23 for left arm numbness. CM met with Pt, son, and daughter at bedside. Pt gave CM permission to complete assessment in front of family. Pt lives at home with her daughter Gillian Smith. Pt has no hx of HH or home O2. Pt has a cane, grab bars, and CPAP at home. Pt is independent with ADLs. Pt denies any problems with ADLs. Pt uses a cane at all times to assist with ambulation. Pt denies any recent falls. Pt is retired. Pt is able to drive. Discharge plan is for Pt to return home. Family will transport Pt home.   _____________________________________  Ariel Lizarraga, [address] [facility] Management  Available via [facility]  2/6/2023   10:10 AM      Care Management Interventions  PCP Verified by CM:  Yes Kimberlyn Trejo MD)  Last Visit to PCP: 12/23/22  Palliative Care Criteria Met (RRAT>21 & CHF Dx)?: No  Mode of Transport at Discharge:  Other (see comment) (family)  Transition of Care Consult (CM Consult): Discharge Planning  MyChart Signup: No  Discharge Durable Medical Equipment: No  Physical Therapy Consult: Yes  Occupational Therapy Consult: Yes  Speech Therapy Consult: Yes  Support Systems: Child(tonia)  Confirm Follow Up Transport: Self  Discharge Location  Patient Expects to be Discharged to[de-identified] Home

## 2023-02-06 NOTE — ED NOTES
TRANSFER - OUT REPORT:    Verbal report given to [facility] on [place]  being transferred to [facility] for routine progression of care       Report consisted of patients Situation, Background, Assessment and   Recommendations(SBAR). Information from the following report(s) SBAR, ED Summary, MAR, Recent Results, Med Rec Status, and Cardiac Rhythm Sinus Rhythm  was reviewed with the receiving nurse. Lines:   Peripheral IV 02/05/23 Right Wrist (Active)   Site Assessment Clean, dry, & intact 02/06/23 0132   Phlebitis Assessment 0 02/06/23 0132   Infiltration Assessment 0 02/06/23 0132   Dressing Status Clean, dry, & intact 02/06/23 0132   Dressing Type Tape;Transparent 02/06/23 0132   Hub Color/Line Status Pink;Flushed;Capped 02/06/23 0132   Action Taken Open ports on tubing capped 02/06/23 0132   Alcohol Cap Used Yes 02/06/23 0132        Opportunity for questions and clarification was provided. Patient transported with:   O2 @ 2 liters        .

## 2023-02-06 NOTE — PROGRESS NOTES
OCCUPATIONAL THERAPY EVALUATION/DISCHARGE  Patient: Khalida Medina ([de-identified] y.o. female)  Date: 2/6/2023  Primary Diagnosis: Left sided numbness [R20.0]       Precautions:        ASSESSMENT  Based on the objective data described below, the patient presents with patient at baseline for ADL tasks and transfers. Patient demonstrates tasks at SBA to mod I today. Patient describes only neck pain currently but no tingling/numbness in UEs. Patient MOD I using cane and no LOB noted. Patient returned to bed at end of session, no current needs. Current Level of Function (ADLs/self-care): Functional Outcome Measure: The patient scored 66/66 on the [address] outcome measure. Other factors to consider for discharge: none     PLAN :  Recommend with staff:     Recommendation for discharge: (in order for the patient to meet his/her long term goals)  No skilled occupational therapy/ follow up rehabilitation needs identified at this time. This discharge recommendation:  Has been made in collaboration with the attending provider and/or case management    IF patient discharges home will need the following DME: none       SUBJECTIVE:   Patient stated Its not there anymore. Its better.     OBJECTIVE DATA SUMMARY:   HISTORY:   Past Medical History:   Diagnosis Date    Current smoker     4 cig pd    Depression 7/15/2010    GERD (gastroesophageal reflux disease) 7/15/2010    High cholesterol 7/15/2010    HTN (hypertension)     Obesity (BMI 30-39.9)     ALESIA (obstructive sleep apnea) 7/15/2010    Osteopenia 7/15/2010    Pseudogout 7/15/2010     Past Surgical History:   Procedure Laterality Date    ENDOSCOPY, COLON, DIAGNOSTIC  2006    Dr. David Miguel  late 20's    JESSICA, one ovary intact, due to bleeding    HX ORTHOPAEDIC      right wrist and neck    HX UROLOGICAL  2011    Bladder sling - Dr. Ivy Lynch       Prior Level of Function/Environment/Context:  Mod I   Expanded or extensive additional review of patient history: Home Situation  Home Environment: Private residence  # Steps to Enter: 4  Rails to Enter: Yes  One/Two Story Residence: One story  Living Alone: No  Support Systems: Child(tonia)  Patient Expects to be Discharged to[de-identified] Home  Current DME Used/Available at Home: Cane, straight, Grab bars, CPAP, Shower chair  Tub or Shower Type: Tub/Shower combination    Hand dominance: Right    EXAMINATION OF PERFORMANCE DEFICITS:  Cognitive/Behavioral Status:  Neurologic State: Alert; Appropriate for age  Orientation Level: Oriented X4  Cognition: Follows commands  Perception: Appears intact  Perseveration: No perseveration noted  Safety/Judgement: Awareness of environment    Skin: intact as seen    Edema: none noted     Hearing: Auditory  Auditory Impairment: Hearing aid(s)    Vision/Perceptual:                                Corrective Lenses: Glasses    Range of Motion:  AROM: Within functional limits                         Strength:  Strength: Within functional limits                Coordination:  Coordination: Within functional limits  Fine Motor Skills-Upper: Left Intact; Right Intact    Gross Motor Skills-Upper: Left Intact; Right Intact    Tone & Sensation:  Tone: Normal  Sensation: Intact                      Balance:  Sitting: Intact  Standing: Intact    Functional Mobility and Transfers for ADLs:  Bed Mobility:  Supine to Sit: Independent  Sit to Supine: Independent  Scooting: Independent    Transfers:  Sit to Stand: Stand-by assistance  Stand to Sit: Stand-by assistance  Bathroom Mobility: Modified independent  Toilet Transfer : Modified independent  Assistive Device : Cane, straight    ADL Assessment:  Feeding: Independent    Oral Facial Hygiene/Grooming: Modified Independent    Bathing: Stand-by assistance         Upper Body Dressing: Supervision    Lower Body Dressing: Modified independent    Toileting: Modified independent                ADL Intervention and task modifications: Cognitive Retraining  Safety/Judgement: Awareness of environment    Therapeutic Exercise:     Functional Measure:  Fugl-Bo Assessment of Motor Recovery after Stroke:     Upper Extremity Assessment: Yes    Reflex Activity  Flexors/Biceps/Fingers: Can be elicited  Extensors/Triceps: Can be elicited  Reflex Subtotal: 4    Volitional Movement Within Synergies  Shoulder Retraction: Full  Shoulder Elevation: Full  Shoulder Abduction (90 degrees): Full  Shoulder External Rotation: Full  Elbow Flexion: Full  Forearm Supination: Full  Shoulder Adduction/Internal Rotation: Full  Elbow Extension: Full  Forearm Pronation: Full  Subtotal: 18    Volitional Movement Mixing Synergies  Hand to Lumbar Spine: Full  Shoulder Flexion (0-90 degrees): Full  Pronation-Supination: Full  Subtotal: 6    Volitional Movement With Little or No Synergy  Shoulder Abduction (0-90 degrees): Full  Shoulder Flexion ( degrees): Full  Pronation/Supination: Full  Subtotal : 6    Normal Reflex Activity  Biceps, Triceps, Finger Flexors: Full  Subtotal : 2    Upper Extremity Total   Upper Extremity Total: 36    Wrist  Stability at 15 Degree Dorsiflexion: Full  Repeated Dorsiflexion/ Volar Flexion: Full  Stability at 15 Degree Dorsiflexion: Full  Repeated Dorsiflexion/ Volar Flexion: Full  Circumduction: Full  Wrist Total: 10    Hand  Mass Flexion: Full  Mass Extension: Full  Grasp A: Full  Grasp B: Full  Grasp C: Full  Grasp D: Full  Grasp E: Full  Hand Total: 14    Coordination/Speed  Tremor: None  Dysmetria: None  Time: <1s  Coordination/Speed Total : 6    Total A-D  Total A-D (Motor Function): 66/66         This is a reliable/valid measure of arm function after a neurological event. It has established value to characterize functional status and for measuring spontaneous and therapy-induced recovery; tests proximal and distal motor functions.  Fugl-Bo Assessment - UE scores recorded between five and 30 days post neurologic event can be used to predict UE recovery at six months post neurologic event. Severe = 0-21 points   Moderately Severe = 22-33 points   Moderate = 34-47 points   Mild = 48-66 points  Virgin MARY Jones, JORDAN Bañuelos, & JULISA Honeycutt (1992). Measurement of motor recovery after stroke: Outcome assessment and sample size requirements. Stroke, 23, pp. 9783-1574.   --------------------------------------------------------------------------------------------------------------------------------------------------------------------  MCID:  Stroke:   Monica Coley et al, 2001; n = 171; mean age 79 (6) years; assessed within 16 (12) days of stroke, Acute Stroke)  FMA Motor Scores from Admission to Discharge   10 point increase in FMA Upper Extremity = 1.5 change in discharge FIM   10 point increase in FMA Lower Extremity = 1.9 change in discharge FIM  MDC:   Stroke:   Tete Castillo et al, 2008, n = 14, mean age = 59.9 (14.6) years, assessed on average 14 (6.5) months post stroke, Chronic Stroke)   FMA = 5.2 points for the Upper Extremity portion of the assessment         Occupational Therapy Evaluation Charge Determination   History Examination Decision-Making   LOW Complexity : Brief history review  LOW Complexity : 1-3 performance deficits relating to physical, cognitive , or psychosocial skils that result in activity limitations and / or participation restrictions  LOW Complexity : No comorbidities that affect functional and no verbal or physical assistance needed to complete eval tasks       Based on the above components, the patient evaluation is determined to be of the following complexity level: LOW   Pain Rating:  None noted     Activity Tolerance:   Good    After treatment patient left in no apparent distress:    Supine in bed, Call bell within reach, and Caregiver / family present    COMMUNICATION/EDUCATION:   The patients plan of care was discussed with: Physical therapist and Registered nurse.      Thank you for this referral.  Presley Contreras, OTR/L  Time Calculation: 24 mins

## 2023-02-09 ENCOUNTER — OFFICE VISIT (OUTPATIENT)
Dept: CARDIOLOGY CLINIC | Age: 83
End: 2023-02-09
Payer: MEDICARE

## 2023-02-09 VITALS
WEIGHT: 200.2 LBS | BODY MASS INDEX: 36.84 KG/M2 | SYSTOLIC BLOOD PRESSURE: 136 MMHG | OXYGEN SATURATION: 94 % | DIASTOLIC BLOOD PRESSURE: 68 MMHG | HEART RATE: 76 BPM | HEIGHT: 62 IN

## 2023-02-09 DIAGNOSIS — I10 ESSENTIAL HYPERTENSION WITH GOAL BLOOD PRESSURE LESS THAN 140/90: Primary | ICD-10-CM

## 2023-02-09 DIAGNOSIS — G47.33 OSA (OBSTRUCTIVE SLEEP APNEA): ICD-10-CM

## 2023-02-09 DIAGNOSIS — E78.9 LIPID DISORDER: ICD-10-CM

## 2023-02-09 NOTE — LETTER
2023    Patient: Alexander Estevez   YOB: 1940   Date of Visit: 2023     Brea Ardon, [address] [address]  Via Fax: [phone]    Dear Brea Ardon MD,      Thank you for referring Ms. Kwan Stevenson to [address] [place] [address] for evaluation. My notes for this consultation are attached. If you have questions, please do not hesitate to call me. I look forward to following your patient along with you.       Sincerely,    Regan Apgar, MD

## 2023-02-09 NOTE — PROGRESS NOTES
CARDIOLOGY OFFICE NOTE    Mark A. Sherleen Cheadle, MD, [address], [place], [address] [facility]  Phone [phone]; Fax [phone]  Mobile [phone]   Voice Mail [phone]            ICD-10-CM ICD-9-CM   1. Essential hypertension with goal blood pressure less than 140/90  I10 401.9   2. Lipid disorder  E78.9 272.9   3. ALESIA (obstructive sleep apnea)  G47.33 327.23            Keyla Simms is a [de-identified] y.o. female with dizziness, edema, dyslipidemia, and HTN referred for follow up. Cardiac risk factors: family history, dyslipidemia, hypertension, post-menopausal  I have personally obtained the history from the patient. HISTORY OF PRESENTING ILLNESS     Vannessa Wiseman was recently hospitalized. She was admitted from 2/5/2020 3-6 23 with a questionable CVA. She was worked up and ruled out. CT MRI were negative LDL was elevated at 125 range. She also had her right leg stent by Dr. Mabel Sharma and may be having the left. She feels significantly better from that standpoint.      ACTIVE PROBLEM LIST     Patient Active Problem List    Diagnosis Date Noted    Left sided numbness 02/05/2023    Peripheral vascular disease ([facility] [address] 10/15/2019    DDD (degenerative disc disease), lumbar 05/08/2018    Obesity, morbid ([address] 12/19/2017    FH: breast cancer in first degree relative 12/19/2017    Allergic to tetanus vaccine 08/09/2016    Hypercalcemia 02/09/2016    Anxiety 04/28/2015    Urinary incontinence 11/18/2014    Advance directive discussed with patient 10/07/2014    Health care maintenance 10/07/2014    Osteoporosis 10/15/2012    Lipid disorder 10/10/2012    Knee pain, right 10/09/2012    Osteoarthritis 10/09/2012    Hyperglycemia 03/15/2012    Chest pain 03/17/2011    History of normal resting EKG 03/17/2011    Colon polyp 03/17/2011    S/P JESSICA-BSO 03/17/2011    Essential hypertension with goal blood pressure less than 140/90 03/17/2011    Pseudogout 07/15/2010    Depression 07/15/2010 GERD (gastroesophageal reflux disease) 07/15/2010    ALESIA (obstructive sleep apnea) 07/15/2010           PAST MEDICAL HISTORY     Past Medical History:   Diagnosis Date    Current smoker     4 cig pd    Depression 7/15/2010    GERD (gastroesophageal reflux disease) 7/15/2010    High cholesterol 7/15/2010    HTN (hypertension)     Obesity (BMI 30-39.9)     ALESIA (obstructive sleep apnea) 7/15/2010    Osteopenia 7/15/2010    Pseudogout 7/15/2010           PAST SURGICAL HISTORY     Past Surgical History:   Procedure Laterality Date    ENDOSCOPY, COLON, DIAGNOSTIC      Dr. Kaycee Bloom GYN  late     JESSICA, one ovary intact, due to bleeding    HX ORTHOPAEDIC      right wrist and neck    HX UROLOGICAL      Bladder sling - Dr. Richards Safe     Allergies   Allergen Reactions    Bactrim [Sulfamethoprim] Swelling    Gabapentin Other (comments)     headache    Lipitor [Atorvastatin] Other (comments)     Aches      Roxicodone [Oxycodone] Swelling    Tetanus-Diphtheria Toxoids-Td Swelling     Allergic to Td?     Welchol [Colesevelam] Other (comments)     Aches            FAMILY HISTORY     Family History   Problem Relation Age of Onset    Diabetes Father     Arthritis-rheumatoid Father     Heart Disease Father     Cancer Maternal Aunt     Breast Cancer Maternal Aunt     Cancer Other     Breast Cancer Sister 58    Breast Cancer Maternal Aunt     negative for cardiac disease       SOCIAL HISTORY     Social History     Socioeconomic History    Marital status: SINGLE   Tobacco Use    Smoking status: Former     Packs/day: 0.50     Types: Cigarettes     Quit date: 2010     Years since quittin.4    Smokeless tobacco: Never   Substance and Sexual Activity    Alcohol use: No     Alcohol/week: 0.8 standard drinks     Types: 1 Glasses of wine per week     Comment: RARE    Drug use: No    Sexual activity: Never         MEDICATIONS     Current Outpatient Medications   Medication Sig    amLODIPine (NORVASC) 5 mg tablet Take 5 mg by mouth daily. omega 3-dha-epa-fish oil (Fish OiL) 100-160-1,000 mg cap Take  by mouth.    metoprolol succinate (TOPROL-XL) 25 mg XL tablet Take 1 Tab by mouth daily. losartan (COZAAR) 100 mg tablet TAKE 1 TABLET BY MOUTH DAILY. cpap machine kit by Does Not Apply route. omeprazole (PRILOSEC) 40 mg capsule Take 40 mg by mouth two (2) times a day. bumetanide (BUMEX) 0.5 mg tablet Take  by mouth daily. calcium carbonate/vitamin D3 (CALCIUM + D PO) Take 1,200 mg by mouth daily. docusate sodium (DOK) 250 mg capsule Take 250 mg by mouth daily. DULoxetine (CYMBALTA) 30 mg capsule Take 1 Cap by mouth daily. traMADol (ULTRAM) 50 mg tablet TAKE 1 TABLET BY MOUTH EVERY 6 HRS AS NEEDED FOR PAIN    fluticasone (FLONASE) 50 mcg/actuation nasal spray INHALE 2 PUFFS INTO EACH NOSTRIL DAILY    cholecalciferol (VITAMIN D3) 1,000 unit cap Take 2,000 Units by mouth daily. acetaminophen (TYLENOL) 650 mg TbER Take 650 mg by mouth four (4) times daily. aspirin 81 mg chewable tablet Take 1 Tab by mouth daily. MULTIVITAMINS (MULTIPLE VITAMIN PO) Take  by mouth. No current facility-administered medications for this visit. I have reviewed the nurses notes, vitals, problem list, allergy list, medical history, family, social history and medications. REVIEW OF SYMPTOMS   Current positive per HPI  General: Pt denies excessive weight gain or loss. Pt is able to conduct ADL's  HEENT: Denies blurred vision, headaches, hearing loss, epistaxis and difficulty swallowing. Respiratory: Denies cough, congestion, shortness of breath, PARRISH, wheezing or stridor.   Cardiovascular: Denies precordial pain, or PND  +palpitations  Gastrointestinal: Denies poor appetite, indigestion, abdominal pain or blood in stool  Genitourinary: Denies hematuria, dysuria, increased urinary frequency  Musculoskeletal: Denies joint pain or swelling from muscles or joints  +bialteral leg pain  Neurologic: Denies tremor, paresthesias, headache, or sensory motor disturbance  Psychiatric: Denies confusion, insomnia, depression  Integumentray: Denies rash, itching or ulcers. Hematologic: Denies easy bruising, bleeding     PHYSICAL EXAMINATION      Vitals:    02/09/23 0838   Height: 5' 2\" (1.575 m)         General: Well developed, in no acute distress. HEENT: No jaundice, oral mucosa moist, no oral ulcers  Neck: Supple, no stiffness, no lymphadenopathy, supple  Heart:  Normal S1/S2 negative S3 or S4. Regular, no murmur, gallop or rub, no jugular venous distention  Respiratory: Clear bilaterally x 4, no wheezing or rales  Abdomen:   Soft, non-tender, bowel sounds are active. Extremities:  No edema, normal cap refill, no cyanosis. Musculoskeletal: No clubbing, no deformities  Neuro: A&Ox3, speech clear, gait stable, cooperative, no focal neurologic deficits  Skin: Skin color is normal. No rashes or lesions. Non diaphoretic, moist.  Vascular: Could not detect DP pulse           DIAGNOSTIC DATA     1. Stress Echo   8/11/09 - normal     2. Echo   2/11/16 - EF 60%   10/15/19-EF 61 - 65%, mild AV sclerosis with no significant stenosis   2/6/23 EF 60 - 65%, Tricuspid AV-Mild sclerosis of AV cusp. 3. EKG   2/8/16 - NSR     4. Cholesterol   2/10/16 - , HDL 52, ,    3/7/19- , HDL 48, ,    9/10/19- , HDL 47, LDL 75,    12/30/22- , HDL 53, ,   2/6/23- , HDL 53, .4,     5. TSH   10/10/15 - normal     6. Cardiolite   3/21-22/16 No Ischemia     7. Loop recorder   (2/20/16-3/20/16): 52->78 BPM with one episode of tachycardia to 151     8. LE Venous Doppler   8/1/19- No evidence of deep vein thrombosis or venous obstruction within the lower extremities bilaterally.     9. MC  11/6/19-Right: Significant peripheral vascular disease with a moderate reduction of the MC (0.68) post exercise,  Left:  Significant peripheral vascular disease with a moderate reduction of the MC (0.76) post exercise. 23- evidence of peripheral vascular disease within the bilateral lower extremity, right > left. , right resting ankle/brachial index is moderately reduced at 0.77 and there is a 31% decrease in the right ankle pressure post low level exercise. left resting ankle/brachial index is mildly reduced at 0.92. Rise in left ankle pressure post low level exercise suggests adequate perfusion in this extremity         LABORATORY DATA            Lab Results   Component Value Date/Time    WBC 9.7 2023 03:37 PM    Hemoglobin (POC) 14.6 2016 03:52 PM    HGB 13.4 2023 03:37 PM    Hematocrit (POC) 43 2016 03:52 PM    HCT 42.2 2023 03:37 PM    PLATELET 494  03:37 PM    MCV 87.6 2023 03:37 PM      Lab Results   Component Value Date/Time    Sodium 140 2023 03:37 PM    Potassium 3.6 2023 03:37 PM    Chloride 109 (H) 2023 03:37 PM    CO2 25 2023 03:37 PM    Anion gap 6 2023 03:37 PM    Glucose 130 (H) 2023 03:37 PM    BUN 17 2023 03:37 PM    Creatinine 1.20 (H) 2023 03:37 PM    BUN/Creatinine ratio 14 2023 03:37 PM    GFR est AA 70 2017 10:08 AM    GFR est non-AA 61 2017 10:08 AM    Calcium 9.1 2023 03:37 PM    Bilirubin, total 0.5 2023 03:37 PM    Alk. phosphatase 71 2023 03:37 PM    Protein, total 7.6 2023 03:37 PM    Albumin 3.8 2023 03:37 PM    Globulin 3.8 2023 03:37 PM    A-G Ratio 1.0 (L) 2023 03:37 PM    ALT (SGPT) 17 2023 03:37 PM        EC/10/23 NSR   ASSESSMENT/RECOMMENDATIONS:.      1. HTN  - /68-we will continue current antihypertensives.  -continue low sodium diet      2. Palpitations  -no irregularity in heart rhtyhm    3.  Dyslipidemia  -LDL went up from   -Concerned there is Erycette we will recheck it if it is elevated we can put her on a low-dose cholesterol-lowering medicine  Lab Results Component Value Date/Time    Cholesterol, total 199 02/06/2023 01:00 AM    HDL Cholesterol 53 02/06/2023 01:00 AM    LDL,Direct 142 (H) 12/19/2017 10:08 AM    LDL, calculated 125.4 (H) 02/06/2023 01:00 AM    VLDL, calculated 20.6 02/06/2023 01:00 AM    Triglyceride 103 02/06/2023 01:00 AM    CHOL/HDL Ratio 3.8 02/06/2023 01:00 AM         4. Claudication sx's  -She is seeing Dr. Rajiv Vazquez now. She did have her right leg stented. Right leg had a MC of 0.68 and left was 0.76.  - MC's ordered today that revealed significant vascular disease, with moderate reduction in MC post exercise. R= 0.68, L= 0.76  -He states that her legs are feeling significantly better    5. Shortness of breath  -She was told in the hospital history of pulmonary hypertension and this was based on a CT scan with dilated pulmonary vessels but her RVSP was 22. ECHO ADULT COMPLETE 02/06/2023 2/6/2023    Interpretation Summary    Left Ventricle: Normal left ventricular systolic function with a visually estimated EF of 60 - 65%. Left ventricle size is normal. Normal wall thickness. Normal wall motion. Normal diastolic function. Right Ventricle: Not well visualized. Right ventricle size is normal. Normal wall thickness. Aortic Valve: Tricuspid valve. Mild sclerosis of the aortic valve cusp. Signed by: Ct Garber DO on 2/6/2023  3:12 PM    Follow-up in 3 months    No orders of the defined types were placed in this encounter. Follow-up and Dispositions    Return in about 6 months (around 8/9/2023). I have discussed the diagnosis with  Vickie Melgar and the intended plan as seen in the above orders. Questions were answered concerning future plans. I have discussed medication side effects and warnings with the patient as well. Thank you,  Belem Loco MD for involving me in the care of  Vickie Melgar. Please do not hesitate to contact me for further questions/concerns. Hubert Lamb MD, [facility]    Patient Care Team:  Tere Boyce MD as PCP - General (Family Medicine)  Nic Patricia MD as Physician (Cardiovascular Disease Physician)  Hilda Zheng RN as Nurse Navigator  Jackie Lindsey MD (General and Vascular Surgery)    [address]      [phone] / [phone] Fax

## 2023-02-09 NOTE — PATIENT INSTRUCTIONS

## 2023-02-09 NOTE — PROGRESS NOTES
Vickie Melgar is a [de-identified] y.o. female    Chief Complaint   Patient presents with    Follow-up     ER follow up  left sided numbness       Vitals:    02/09/23 0838   BP: 136/68   BP 1 Location: Left upper arm   BP Patient Position: Sitting   Pulse: 76   Height: 5' 2\" (1.575 m)   Weight: 200 lb 3.2 oz (90.8 kg)   SpO2: 94%     Some back pain     Chest pain no some palpitations    SOB some SOB    Dizziness some slight dizziness at times    Swelling no    Refills no      1. Have you been to the ER, urgent care clinic since your last visit? Hospitalized since your last visit? ER 2/5/2023    2. Have you seen or consulted any other health care providers outside of the [address] [place] [address] since your last visit? Include any pap smears or colon screening.  No

## 2023-02-21 ENCOUNTER — TELEPHONE (OUTPATIENT)
Dept: CARDIOLOGY CLINIC | Age: 83
End: 2023-02-21

## 2023-02-21 DIAGNOSIS — E78.9 LIPID DISORDER: Primary | ICD-10-CM

## 2023-02-21 RX ORDER — ROSUVASTATIN CALCIUM 10 MG/1
10 TABLET, COATED ORAL
Qty: 30 TABLET | Refills: 5 | Status: SHIPPED | OUTPATIENT
Start: 2023-02-21

## 2023-02-21 RX ORDER — ROSUVASTATIN CALCIUM 10 MG/1
10 TABLET, COATED ORAL
COMMUNITY
End: 2023-02-21 | Stop reason: SDUPTHER

## 2023-02-21 NOTE — TELEPHONE ENCOUNTER
Pt requested a call from the nurse to discuss blood work done in the past.  Please Advise    Pt # [phone]

## 2023-03-21 ENCOUNTER — TELEPHONE (OUTPATIENT)
Dept: CARDIOLOGY CLINIC | Age: 83
End: 2023-03-21

## 2023-04-05 ENCOUNTER — DOCUMENTATION ONLY (OUTPATIENT)
Dept: CARDIOLOGY CLINIC | Age: 83
End: 2023-04-05

## 2023-04-05 ENCOUNTER — OFFICE VISIT (OUTPATIENT)
Dept: CARDIOLOGY CLINIC | Age: 83
End: 2023-04-05

## 2023-04-05 NOTE — PROGRESS NOTES
Merle Miller is a [de-identified] y.o. female    Chief Complaint   Patient presents with    Follow-up    Cholesterol Problem    Hypertension       Vitals:    04/05/23 1514 04/05/23 1524   BP: (!) 140/90 (!) 140/70   BP 1 Location: Left arm Right arm   BP Patient Position: Sitting Sitting   Pulse: 67    Height: 5' 2\" (1.575 m)    Weight: 188 lb (85.3 kg)    SpO2: 94%        Chest pain no    SOB no    Swelling ankles and feet when on them long    Dizziness no       1. Have you been to the ER, urgent care clinic since your last visit? Hospitalized since your last visit? No    2. Have you seen or consulted any other health care providers outside of the [address] [place] [address] since your last visit? Include any pap smears or colon screening.  No

## 2023-04-05 NOTE — PROGRESS NOTES
CARDIOLOGY OFFICE NOTE    Hubert Clark MD, [address], [place], [address] [facility]  Phone [phone]; Fax [phone]  Mobile [phone]   Voice Mail [phone]          No diagnosis found. Ct Solorzano is a [de-identified] y.o. female with dizziness, edema, dyslipidemia, and HTN referred for follow up. Cardiac risk factors: family history, dyslipidemia, hypertension, post-menopausal  I have personally obtained the history from the patient. HISTORY OF PRESENTING ILLNESS     Vannessa Villasenor is a very pleasant lady who is following up for dyslipidemia, palpitations, dizziness and edema as well as hypertension. She was hospitalized with a questionable CVA in the past.  Work-up negative CT was negative. She does state that she has been having palpitations that may have been increasing in frequency and duration. She did wear a monitor about 7 years ago she had 1 episode of SVT. She also has some tingling in her toes. I asked that she speak with Dr. Beena Murray her vascular surgeon regarding this. She did have a procedure done on her legs and her legs are feeling better. She had a stent in her right leg. I reviewed her cholesterol profile her LDL goals. We also talked about her blood pressure being slightly elevated she will follow it at home and provide me numbers in the next month.      ACTIVE PROBLEM LIST     Patient Active Problem List    Diagnosis Date Noted    Left sided numbness 02/05/2023    Peripheral vascular disease ([address] 10/15/2019    DDD (degenerative disc disease), lumbar 05/08/2018    Obesity, morbid ([address] 12/19/2017    FH: breast cancer in first degree relative 12/19/2017    Allergic to tetanus vaccine 08/09/2016    Hypercalcemia 02/09/2016    Anxiety 04/28/2015    Urinary incontinence 11/18/2014    Advance directive discussed with patient 10/07/2014    Health care maintenance 10/07/2014    Osteoporosis 10/15/2012    Lipid disorder 10/10/2012 Knee pain, right 10/09/2012    Osteoarthritis 10/09/2012    Hyperglycemia 03/15/2012    Chest pain 03/17/2011    History of normal resting EKG 03/17/2011    Colon polyp 03/17/2011    S/P JESSICA-BSO 03/17/2011    Essential hypertension with goal blood pressure less than 140/90 03/17/2011    Pseudogout 07/15/2010    Depression 07/15/2010    GERD (gastroesophageal reflux disease) 07/15/2010    ALESIA (obstructive sleep apnea) 07/15/2010           PAST MEDICAL HISTORY     Past Medical History:   Diagnosis Date    Current smoker     4 cig pd    Depression 7/15/2010    GERD (gastroesophageal reflux disease) 7/15/2010    High cholesterol 7/15/2010    HTN (hypertension)     Obesity (BMI 30-39.9)     ALESIA (obstructive sleep apnea) 7/15/2010    Osteopenia 7/15/2010    Pseudogout 7/15/2010           PAST SURGICAL HISTORY     Past Surgical History:   Procedure Laterality Date    ENDOSCOPY, COLON, DIAGNOSTIC  2006    Dr. Spana Hicks GYN  late 19's    JESSICA, one ovary intact, due to bleeding    HX ORTHOPAEDIC      right wrist and neck    HX UROLOGICAL  2011    Bladder sling - Dr. Yang Malave     Allergies   Allergen Reactions    Bactrim [Sulfamethoprim] Swelling    Gabapentin Other (comments)     headache    Lipitor [Atorvastatin] Other (comments)     Aches      Roxicodone [Oxycodone] Swelling    Tetanus-Diphtheria Toxoids-Td Swelling     Allergic to Td?     Welchol [Colesevelam] Other (comments)     Aches            FAMILY HISTORY     Family History   Problem Relation Age of Onset    Diabetes Father     Arthritis-rheumatoid Father     Heart Disease Father     Cancer Maternal Aunt     Breast Cancer Maternal Aunt     Cancer Other     Breast Cancer Sister 58    Breast Cancer Maternal Aunt     negative for cardiac disease       SOCIAL HISTORY     Social History     Socioeconomic History    Marital status: SINGLE   Tobacco Use    Smoking status: Former     Packs/day: 0.50     Types: Cigarettes     Quit date: 9/7/2010     Years since quittin.5    Smokeless tobacco: Never   Substance and Sexual Activity    Alcohol use: No     Alcohol/week: 0.8 standard drinks     Types: 1 Glasses of wine per week     Comment: RARE    Drug use: No    Sexual activity: Never         MEDICATIONS     Current Outpatient Medications   Medication Sig    rosuvastatin (Crestor) 10 mg tablet Take 1 Tablet by mouth nightly. amLODIPine (NORVASC) 5 mg tablet Take 1 Tablet by mouth daily. omega 3-dha-epa-fish oil (Fish OiL) 100-160-1,000 mg cap Take  by mouth.    metoprolol succinate (TOPROL-XL) 25 mg XL tablet Take 1 Tab by mouth daily. losartan (COZAAR) 100 mg tablet TAKE 1 TABLET BY MOUTH DAILY. cpap machine kit by Does Not Apply route. omeprazole (PRILOSEC) 40 mg capsule Take 1 Capsule by mouth two (2) times a day. bumetanide (BUMEX) 0.5 mg tablet Take  by mouth daily. calcium carbonate/vitamin D3 (CALCIUM + D PO) Take 1,200 mg by mouth daily. docusate sodium (DOK) 250 mg capsule Take 1 Capsule by mouth as needed. DULoxetine (CYMBALTA) 30 mg capsule Take 1 Cap by mouth daily. traMADol (ULTRAM) 50 mg tablet TAKE 1 TABLET BY MOUTH EVERY 6 HRS AS NEEDED FOR PAIN    fluticasone (FLONASE) 50 mcg/actuation nasal spray INHALE 2 PUFFS INTO EACH NOSTRIL DAILY    cholecalciferol (VITAMIN D3) 1,000 unit cap Take 2 Capsules by mouth daily. acetaminophen (TYLENOL) 650 mg TbER Take 1 Tablet by mouth four (4) times daily. aspirin 81 mg chewable tablet Take 1 Tab by mouth daily. MULTIVITAMINS (MULTIPLE VITAMIN PO) Take  by mouth. No current facility-administered medications for this visit. I have reviewed the nurses notes, vitals, problem list, allergy list, medical history, family, social history and medications. REVIEW OF SYMPTOMS   Current positive per HPI  General: Pt denies excessive weight gain or loss.  Pt is able to conduct ADL's  HEENT: Denies blurred vision, headaches, hearing loss, epistaxis and difficulty swallowing. Respiratory: Denies cough, congestion, shortness of breath, PARRISH, wheezing or stridor. Cardiovascular: Denies precordial pain, or PND  +palpitations  Gastrointestinal: Denies poor appetite, indigestion, abdominal pain or blood in stool  Genitourinary: Denies hematuria, dysuria, increased urinary frequency  Musculoskeletal: Denies joint pain or swelling from muscles or joints  +bialteral leg pain  Neurologic: Denies tremor, paresthesias, headache, or sensory motor disturbance  Psychiatric: Denies confusion, insomnia, depression  Integumentray: Denies rash, itching or ulcers. Hematologic: Denies easy bruising, bleeding     PHYSICAL EXAMINATION      Vitals:    04/05/23 1514 04/05/23 1524   BP: (!) 140/90 (!) 140/70   Pulse: 67    SpO2: 94%    Weight: 188 lb (85.3 kg)    Height: 5' 2\" (1.575 m)          General: Well developed, in no acute distress. HEENT: No jaundice  Neck: Supple, no stiffness  Heart:  Normal S1/S2 negative S3 or S4. Regular, no murmur, gallop or rub, no jugular venous distention  Respiratory: Clear bilaterally x 4, no wheezing or rales  Extremities:  No edema, normal cap refill, no cyanosis. Musculoskeletal: No clubbing, no deformities  Neuro: A&Ox3, speech clear, gait stable, cooperative, no focal neurologic deficits  Skin: Skin color is normal. + bruising Non diaphoretic, moist.         DIAGNOSTIC DATA     1. Stress Echo   8/11/09 - normal     2. Echo   2/11/16 - EF 60%   10/15/19-EF 61 - 65%, mild AV sclerosis with no significant stenosis   2/6/23 EF 60 - 65%, Tricuspid AV-Mild sclerosis of AV cusp. 3. EKG   2/8/16 - NSR     4. Cholesterol   2/10/16 - , HDL 52, ,    3/7/19- , HDL 48, ,    9/10/19- , HDL 47, LDL 75,    12/30/22- , HDL 53, ,   2/6/23- , HDL 53, .4,     5. TSH   10/10/15 - normal     6. Cardiolite   3/21-22/16 No Ischemia     7.  Loop recorder   (2/20/16-3/20/16): 52->78 BPM with one episode of tachycardia to 151     8. LE Venous Doppler   19- No evidence of deep vein thrombosis or venous obstruction within the lower extremities bilaterally. 9. MC  19-Right: Significant peripheral vascular disease with a moderate reduction of the MC (0.68) post exercise,  Left:  Significant peripheral vascular disease with a moderate reduction of the MC (0.76) post exercise. 23- evidence of peripheral vascular disease within the bilateral lower extremity, right > left. , right resting ankle/brachial index is moderately reduced at 0.77 and there is a 31% decrease in the right ankle pressure post low level exercise. left resting ankle/brachial index is mildly reduced at 0.92. Rise in left ankle pressure post low level exercise suggests adequate perfusion in this extremity         LABORATORY DATA            Lab Results   Component Value Date/Time    WBC 9.7 2023 03:37 PM    Hemoglobin (POC) 14.6 2016 03:52 PM    HGB 13.4 2023 03:37 PM    Hematocrit (POC) 43 2016 03:52 PM    HCT 42.2 2023 03:37 PM    PLATELET 843  03:37 PM    MCV 87.6 2023 03:37 PM      Lab Results   Component Value Date/Time    Sodium 140 2023 03:37 PM    Potassium 3.6 2023 03:37 PM    Chloride 109 (H) 2023 03:37 PM    CO2 25 2023 03:37 PM    Anion gap 6 2023 03:37 PM    Glucose 130 (H) 2023 03:37 PM    BUN 17 2023 03:37 PM    Creatinine 1.20 (H) 2023 03:37 PM    BUN/Creatinine ratio 14 2023 03:37 PM    GFR est AA 70 2017 10:08 AM    GFR est non-AA 61 2017 10:08 AM    Calcium 9.1 2023 03:37 PM    Bilirubin, total 0.5 2023 03:37 PM    Alk.  phosphatase 71 2023 03:37 PM    Protein, total 7.6 2023 03:37 PM    Albumin 3.8 2023 03:37 PM    Globulin 3.8 2023 03:37 PM    A-G Ratio 1.0 (L) 2023 03:37 PM    ALT (SGPT) 17 2023 03:37 PM        EC/10/23 NSR ASSESSMENT/RECOMMENDATIONS:.      1. HTN  -she knows when BP is high and has a headache and has not had many headaches recently.  -Blood pressure is up slightly but we will not adjust her antihypertensives as yet. She will call her numbers at home and get me some readings in the next month  -continue low sodium diet      2. Palpitations  -she notices the palpitation are most at night while in bed. She does not notice when activate.  -she has noticed that the palpations are more frequent and last longer. 3. Dyslipidemia  -LDL went up from 75->125  -will be getting cholesterol checked in April 22 th  -continue diet low in red meat    Lab Results   Component Value Date/Time    Cholesterol, total 216 (H) 02/09/2023 09:38 AM    HDL Cholesterol 57 02/09/2023 09:38 AM    LDL,Direct 142 (H) 12/19/2017 10:08 AM    LDL, calculated 124 (H) 02/09/2023 09:38 AM    VLDL, calculated 35 02/09/2023 09:38 AM    Triglyceride 175 (H) 02/09/2023 09:38 AM    CHOL/HDL Ratio 3.8 02/09/2023 09:38 AM         4. Claudication sx's  -toes are cold at night and has tingling sensation in legs and asked she discuss this with Clark Irene. She did have her right leg stented secondary to right leg that had a MC of 0.68 and left was 0.76.      5.  Shortness of breath  -She is not describing any shortness of breath at this time  -She was told in the hospital history of pulmonary hypertension and this was based on a CT scan with dilated pulmonary vessels but her RVSP was 22. ECHO ADULT COMPLETE 02/06/2023 2/6/2023    Interpretation Summary    Left Ventricle: Normal left ventricular systolic function with a visually estimated EF of 60 - 65%. Left ventricle size is normal. Normal wall thickness. Normal wall motion. Normal diastolic function. Right Ventricle: Not well visualized. Right ventricle size is normal. Normal wall thickness. Aortic Valve: Tricuspid valve. Mild sclerosis of the aortic valve cusp.     Signed by: Josee Lauren DO on 2/6/2023  3:12 PM    Follow-up in 6 mo    No orders of the defined types were placed in this encounter. I have discussed the diagnosis with  Richard Babcock and the intended plan as seen in the above orders. Questions were answered concerning future plans. I have discussed medication side effects and warnings with the patient as well. Thank you,  Wojciech Bailon MD for involving me in the care of  Richard Babcock. Please do not hesitate to contact me for further questions/concerns. Hubetr Gonzales MD, [facility]    Patient Care Team:  Wojciech Bailon MD as PCP - General (Family Medicine)  Jin Trevino MD as Physician (Cardiovascular Disease Physician)  Dimple Cárdenas RN as Nurse Navigator  Aguila Canseco MD (General and Vascular Surgery)    [address], [address] [place] [address] [facility]      [phone] / [phone] Fax

## 2023-04-05 NOTE — LETTER
4/5/2023    Patient: Rosibel Vines   YOB: 1940   Date of Visit: 4/5/2023     Anthony Sawyer, [address] [address]  Via Fax: [phone]    Dear Anthony Sawyer MD,      Thank you for referring Ms. Jemal Pinzon to [address] [place] [address] for evaluation. My notes for this consultation are attached. If you have questions, please do not hesitate to call me. I look forward to following your patient along with you.       Sincerely,    Bladimir Anders MD

## 2023-04-25 DIAGNOSIS — E78.9 LIPID DISORDER: Primary | ICD-10-CM

## 2023-05-09 DIAGNOSIS — E78.9 LIPID DISORDER: Primary | ICD-10-CM

## 2023-05-10 ENCOUNTER — TELEPHONE (OUTPATIENT)
Age: 83
End: 2023-05-10

## 2023-05-10 RX ORDER — ROSUVASTATIN CALCIUM 10 MG/1
10 TABLET, COATED ORAL DAILY
Qty: 90 TABLET | Refills: 3 | Status: SHIPPED | OUTPATIENT
Start: 2023-05-10

## 2023-05-10 NOTE — TELEPHONE ENCOUNTER
Spoke with patient about her blood pressures and [employer] monitor. Told her that  Dr Jens Chavez said that everything looked good. She also wanted a refill on her Rouvstatin 10 mg sent to [facility].  I gave information to [facility] LPN

## 2023-06-20 ENCOUNTER — ANCILLARY PROCEDURE (OUTPATIENT)
Age: 83
End: 2023-06-20

## 2023-06-20 DIAGNOSIS — R00.2 PALPITATION: ICD-10-CM

## 2023-06-20 PROCEDURE — 93228 REMOTE 30 DAY ECG REV/REPORT: CPT | Performed by: SPECIALIST

## 2023-06-20 PROCEDURE — 93229 REMOTE 30 DAY ECG TECH SUPP: CPT

## 2023-07-20 ENCOUNTER — TRANSCRIBE ORDERS (OUTPATIENT)
Facility: HOSPITAL | Age: 83
End: 2023-07-20

## 2023-07-20 DIAGNOSIS — M54.6 THORACIC SPINE PAIN: Primary | ICD-10-CM

## 2023-07-28 ENCOUNTER — HOSPITAL ENCOUNTER (OUTPATIENT)
Facility: HOSPITAL | Age: 83
End: 2023-07-28
Attending: ORTHOPAEDIC SURGERY
Payer: MEDICARE

## 2023-07-28 DIAGNOSIS — M54.6 THORACIC SPINE PAIN: ICD-10-CM

## 2023-07-28 PROCEDURE — 72146 MRI CHEST SPINE W/O DYE: CPT

## 2023-09-29 ENCOUNTER — TELEPHONE (OUTPATIENT)
Age: 83
End: 2023-09-29

## 2023-09-29 NOTE — TELEPHONE ENCOUNTER
Pt is calling because she needs cardiac clearance for hip surgery. Pt is schedule for 10/18/23.     [address] [place] [address]  [phone] patient

## 2023-10-03 ENCOUNTER — TRANSCRIBE ORDERS (OUTPATIENT)
Facility: HOSPITAL | Age: 83
End: 2023-10-03

## 2023-10-03 DIAGNOSIS — Z12.31 VISIT FOR SCREENING MAMMOGRAM: Primary | ICD-10-CM

## 2023-10-04 ENCOUNTER — HOSPITAL ENCOUNTER (OUTPATIENT)
Facility: HOSPITAL | Age: 83
Discharge: HOME | End: 2023-10-07
Payer: MEDICARE

## 2023-10-04 VITALS
HEIGHT: 62 IN | RESPIRATION RATE: 16 BRPM | TEMPERATURE: 97.9 F | OXYGEN SATURATION: 97 % | SYSTOLIC BLOOD PRESSURE: 138 MMHG | BODY MASS INDEX: 33.95 KG/M2 | DIASTOLIC BLOOD PRESSURE: 63 MMHG | HEART RATE: 75 BPM | WEIGHT: 184.5 LBS

## 2023-10-04 DIAGNOSIS — Z12.31 VISIT FOR SCREENING MAMMOGRAM: ICD-10-CM

## 2023-10-04 LAB
ABO + RH BLD: NORMAL
ALBUMIN SERPL-MCNC: 4 G/DL (ref 3.5–5)
ALBUMIN/GLOB SERPL: 1.2 (ref 1.1–2.2)
ALP SERPL-CCNC: 70 U/L (ref 45–117)
ALT SERPL-CCNC: 14 U/L (ref 12–78)
ANION GAP SERPL CALC-SCNC: 5 MMOL/L (ref 5–15)
APPEARANCE UR: CLEAR
AST SERPL-CCNC: 16 U/L (ref 15–37)
BACTERIA URNS QL MICRO: NEGATIVE /HPF
BASOPHILS # BLD: 0 K/UL (ref 0–0.1)
BASOPHILS NFR BLD: 0 % (ref 0–1)
BILIRUB SERPL-MCNC: 0.6 MG/DL (ref 0.2–1)
BILIRUB UR QL CFM: NEGATIVE
BLOOD GROUP ANTIBODIES SERPL: NORMAL
BUN SERPL-MCNC: 26 MG/DL (ref 6–20)
BUN/CREAT SERPL: 23 (ref 12–20)
CALCIUM SERPL-MCNC: 9.1 MG/DL (ref 8.5–10.1)
CHLORIDE SERPL-SCNC: 106 MMOL/L (ref 97–108)
CO2 SERPL-SCNC: 27 MMOL/L (ref 21–32)
COLOR UR: YELLOW
CREAT SERPL-MCNC: 1.15 MG/DL (ref 0.55–1.02)
DIFFERENTIAL METHOD BLD: ABNORMAL
EKG ATRIAL RATE: 64 BPM
EKG DIAGNOSIS: NORMAL
EKG P AXIS: 46 DEGREES
EKG P-R INTERVAL: 152 MS
EKG Q-T INTERVAL: 430 MS
EKG QRS DURATION: 84 MS
EKG QTC CALCULATION (BAZETT): 443 MS
EKG R AXIS: -9 DEGREES
EKG T AXIS: 23 DEGREES
EKG VENTRICULAR RATE: 64 BPM
EOSINOPHIL # BLD: 0.2 K/UL (ref 0–0.4)
EOSINOPHIL NFR BLD: 2 % (ref 0–7)
EPITH CASTS URNS QL MICRO: ABNORMAL /LPF
ERYTHROCYTE [DISTWIDTH] IN BLOOD BY AUTOMATED COUNT: 15 % (ref 11.5–14.5)
GLOBULIN SER CALC-MCNC: 3.3 G/DL (ref 2–4)
GLUCOSE SERPL-MCNC: 96 MG/DL (ref 65–100)
GLUCOSE UR STRIP.AUTO-MCNC: NEGATIVE MG/DL
HCT VFR BLD AUTO: 42.9 % (ref 35–47)
HGB BLD-MCNC: 13.4 G/DL (ref 11.5–16)
HGB UR QL STRIP: NEGATIVE
IMM GRANULOCYTES # BLD AUTO: 0 K/UL (ref 0–0.04)
IMM GRANULOCYTES NFR BLD AUTO: 0 % (ref 0–0.5)
KETONES UR QL STRIP.AUTO: ABNORMAL MG/DL
LEUKOCYTE ESTERASE UR QL STRIP.AUTO: ABNORMAL
LYMPHOCYTES # BLD: 3.2 K/UL (ref 0.8–3.5)
LYMPHOCYTES NFR BLD: 35 % (ref 12–49)
MCH RBC QN AUTO: 27.9 PG (ref 26–34)
MCHC RBC AUTO-ENTMCNC: 31.2 G/DL (ref 30–36.5)
MCV RBC AUTO: 89.4 FL (ref 80–99)
MONOCYTES # BLD: 0.8 K/UL (ref 0–1)
MONOCYTES NFR BLD: 9 % (ref 5–13)
MUCOUS THREADS URNS QL MICRO: ABNORMAL /LPF
NEUTS SEG # BLD: 4.9 K/UL (ref 1.8–8)
NEUTS SEG NFR BLD: 54 % (ref 32–75)
NITRITE UR QL STRIP.AUTO: NEGATIVE
NRBC # BLD: 0 K/UL (ref 0–0.01)
NRBC BLD-RTO: 0 PER 100 WBC
PH UR STRIP: 5.5 (ref 5–8)
PLATELET # BLD AUTO: 279 K/UL (ref 150–400)
PMV BLD AUTO: 10.1 FL (ref 8.9–12.9)
POTASSIUM SERPL-SCNC: 3.9 MMOL/L (ref 3.5–5.1)
PROT SERPL-MCNC: 7.3 G/DL (ref 6.4–8.2)
PROT UR STRIP-MCNC: NEGATIVE MG/DL
RBC # BLD AUTO: 4.8 M/UL (ref 3.8–5.2)
RBC #/AREA URNS HPF: ABNORMAL /HPF (ref 0–5)
SODIUM SERPL-SCNC: 138 MMOL/L (ref 136–145)
SP GR UR REFRACTOMETRY: 1.02 (ref 1–1.03)
SPECIMEN EXP DATE BLD: NORMAL
URINE CULTURE IF INDICATED: ABNORMAL
UROBILINOGEN UR QL STRIP.AUTO: 1 EU/DL (ref 0.2–1)
WBC # BLD AUTO: 9.1 K/UL (ref 3.6–11)
WBC URNS QL MICRO: ABNORMAL /HPF (ref 0–4)

## 2023-10-04 PROCEDURE — 77067 SCR MAMMO BI INCL CAD: CPT

## 2023-10-04 PROCEDURE — 86850 RBC ANTIBODY SCREEN: CPT

## 2023-10-04 PROCEDURE — 86901 BLOOD TYPING SEROLOGIC RH(D): CPT

## 2023-10-04 PROCEDURE — 83036 HEMOGLOBIN GLYCOSYLATED A1C: CPT

## 2023-10-04 PROCEDURE — 93005 ELECTROCARDIOGRAM TRACING: CPT | Performed by: NURSE PRACTITIONER

## 2023-10-04 PROCEDURE — 93010 ELECTROCARDIOGRAM REPORT: CPT | Performed by: SPECIALIST

## 2023-10-04 PROCEDURE — 86900 BLOOD TYPING SEROLOGIC ABO: CPT

## 2023-10-04 PROCEDURE — 85025 COMPLETE CBC W/AUTO DIFF WBC: CPT

## 2023-10-04 PROCEDURE — 80053 COMPREHEN METABOLIC PANEL: CPT

## 2023-10-04 PROCEDURE — 36415 COLL VENOUS BLD VENIPUNCTURE: CPT

## 2023-10-04 PROCEDURE — 81001 URINALYSIS AUTO W/SCOPE: CPT

## 2023-10-04 RX ORDER — BACLOFEN 10 MG/1
10 TABLET ORAL
COMMUNITY

## 2023-10-04 RX ORDER — DOCUSATE SODIUM 100 MG/1
100 CAPSULE, LIQUID FILLED ORAL AS NEEDED
COMMUNITY

## 2023-10-04 RX ORDER — SOLIFENACIN SUCCINATE 10 MG/1
5 TABLET, FILM COATED ORAL
COMMUNITY

## 2023-10-04 RX ORDER — ALENDRONATE SODIUM 70 MG/1
70 TABLET ORAL
COMMUNITY

## 2023-10-04 ASSESSMENT — ENCOUNTER SYMPTOMS
WHEEZING: 0
BLOOD IN STOOL: 0
SORE THROAT: 0
SHORTNESS OF BREATH: 0
ABDOMINAL PAIN: 0
COUGH: 0
NAUSEA: 0
VOMITING: 0
TROUBLE SWALLOWING: 0

## 2023-10-04 NOTE — PERIOP NOTE
Aspirin instructions for surgery on 10/18/2023  Received: Today  MICHAEL Moore MD  Cc: Katherine Hassan RN; Sriram Olivera RN; Terri Salazar RN; Jose Wiggins,     Ms. Ezequiel Buchanan is having hip replacement surgery on 10/18/2023. She states she was instructed by you to start taking Aspirin so we would like to give her your instructions regarding if/when she should stop it prior to her surgery. She is scheduled for a clearance appointment with you on 10/9/2023. Thanks!      Bart Reza RN

## 2023-10-05 VITALS — BODY MASS INDEX: 33.49 KG/M2 | HEIGHT: 62 IN | WEIGHT: 182 LBS

## 2023-10-05 LAB
BACTERIA SPEC CULT: NORMAL
BACTERIA SPEC CULT: NORMAL
EST. AVERAGE GLUCOSE BLD GHB EST-MCNC: 114 MG/DL
HBA1C MFR BLD: 5.6 % (ref 4–5.6)
SERVICE CMNT-IMP: NORMAL

## 2023-10-09 ENCOUNTER — OFFICE VISIT (OUTPATIENT)
Age: 83
End: 2023-10-09
Payer: MEDICARE

## 2023-10-09 VITALS
SYSTOLIC BLOOD PRESSURE: 170 MMHG | OXYGEN SATURATION: 99 % | BODY MASS INDEX: 33.86 KG/M2 | DIASTOLIC BLOOD PRESSURE: 88 MMHG | HEART RATE: 67 BPM | HEIGHT: 62 IN | WEIGHT: 184 LBS

## 2023-10-09 DIAGNOSIS — R00.2 PALPITATION: Primary | ICD-10-CM

## 2023-10-09 DIAGNOSIS — G47.33 OBSTRUCTIVE SLEEP APNEA (ADULT) (PEDIATRIC): ICD-10-CM

## 2023-10-09 DIAGNOSIS — I10 ESSENTIAL (PRIMARY) HYPERTENSION: ICD-10-CM

## 2023-10-09 DIAGNOSIS — E78.9 DISORDER OF LIPOPROTEIN METABOLISM, UNSPECIFIED: ICD-10-CM

## 2023-10-09 PROCEDURE — 99214 OFFICE O/P EST MOD 30 MIN: CPT | Performed by: SPECIALIST

## 2023-10-09 PROCEDURE — G8484 FLU IMMUNIZE NO ADMIN: HCPCS | Performed by: SPECIALIST

## 2023-10-09 PROCEDURE — G8417 CALC BMI ABV UP PARAM F/U: HCPCS | Performed by: SPECIALIST

## 2023-10-09 PROCEDURE — 3079F DIAST BP 80-89 MM HG: CPT | Performed by: SPECIALIST

## 2023-10-09 PROCEDURE — 1036F TOBACCO NON-USER: CPT | Performed by: SPECIALIST

## 2023-10-09 PROCEDURE — G8427 DOCREV CUR MEDS BY ELIG CLIN: HCPCS | Performed by: SPECIALIST

## 2023-10-09 PROCEDURE — 3077F SYST BP >= 140 MM HG: CPT | Performed by: SPECIALIST

## 2023-10-09 PROCEDURE — 1123F ACP DISCUSS/DSCN MKR DOCD: CPT | Performed by: SPECIALIST

## 2023-10-09 PROCEDURE — G8399 PT W/DXA RESULTS DOCUMENT: HCPCS | Performed by: SPECIALIST

## 2023-10-09 PROCEDURE — 1090F PRES/ABSN URINE INCON ASSESS: CPT | Performed by: SPECIALIST

## 2023-10-09 RX ORDER — AMLODIPINE BESYLATE 10 MG/1
5 TABLET ORAL DAILY
Qty: 30 TABLET | Refills: 5 | Status: SHIPPED | OUTPATIENT
Start: 2023-10-09

## 2023-10-09 NOTE — PATIENT INSTRUCTIONS

## 2023-10-17 ENCOUNTER — ANESTHESIA EVENT (OUTPATIENT)
Facility: HOSPITAL | Age: 83
End: 2023-10-17
Payer: MEDICARE

## 2023-10-18 ENCOUNTER — HOSPITAL ENCOUNTER (INPATIENT)
Facility: HOSPITAL | Age: 83
LOS: 1 days | Discharge: HOME | End: 2023-10-20
Attending: ORTHOPAEDIC SURGERY | Admitting: ORTHOPAEDIC SURGERY
Payer: MEDICARE

## 2023-10-18 ENCOUNTER — ANESTHESIA (OUTPATIENT)
Facility: HOSPITAL | Age: 83
End: 2023-10-18
Payer: MEDICARE

## 2023-10-18 ENCOUNTER — APPOINTMENT (OUTPATIENT)
Facility: HOSPITAL | Age: 83
End: 2023-10-18
Attending: ORTHOPAEDIC SURGERY
Payer: MEDICARE

## 2023-10-18 DIAGNOSIS — M16.12 ARTHRITIS OF LEFT HIP: Primary | ICD-10-CM

## 2023-10-18 PROCEDURE — 7100000001 HC PACU RECOVERY - ADDTL 15 MIN: Performed by: ORTHOPAEDIC SURGERY

## 2023-10-18 PROCEDURE — 64447 NJX AA&/STRD FEMORAL NRV IMG: CPT | Performed by: ANESTHESIOLOGY

## 2023-10-18 PROCEDURE — 3600000005 HC SURGERY LEVEL 5 BASE: Performed by: ORTHOPAEDIC SURGERY

## 2023-10-18 PROCEDURE — 6370000000 HC RX 637 (ALT 250 FOR IP): Performed by: ANESTHESIOLOGY

## 2023-10-18 PROCEDURE — 3700000001 HC ADD 15 MINUTES (ANESTHESIA): Performed by: ORTHOPAEDIC SURGERY

## 2023-10-18 PROCEDURE — 6360000002 HC RX W HCPCS: Performed by: ANESTHESIOLOGY

## 2023-10-18 PROCEDURE — 97116 GAIT TRAINING THERAPY: CPT

## 2023-10-18 PROCEDURE — 97161 PT EVAL LOW COMPLEX 20 MIN: CPT

## 2023-10-18 PROCEDURE — 3600000015 HC SURGERY LEVEL 5 ADDTL 15MIN: Performed by: ORTHOPAEDIC SURGERY

## 2023-10-18 PROCEDURE — 2709999900 HC NON-CHARGEABLE SUPPLY: Performed by: ORTHOPAEDIC SURGERY

## 2023-10-18 PROCEDURE — 6370000000 HC RX 637 (ALT 250 FOR IP): Performed by: ORTHOPAEDIC SURGERY

## 2023-10-18 PROCEDURE — 3700000000 HC ANESTHESIA ATTENDED CARE: Performed by: ORTHOPAEDIC SURGERY

## 2023-10-18 PROCEDURE — C1713 ANCHOR/SCREW BN/BN,TIS/BN: HCPCS | Performed by: ORTHOPAEDIC SURGERY

## 2023-10-18 PROCEDURE — 2580000003 HC RX 258: Performed by: ORTHOPAEDIC SURGERY

## 2023-10-18 PROCEDURE — 0SRB039 REPLACEMENT OF LEFT HIP JOINT WITH CERAMIC SYNTHETIC SUBSTITUTE, CEMENTED, OPEN APPROACH: ICD-10-PCS | Performed by: ORTHOPAEDIC SURGERY

## 2023-10-18 PROCEDURE — 2580000003 HC RX 258: Performed by: ANESTHESIOLOGY

## 2023-10-18 PROCEDURE — 7100000000 HC PACU RECOVERY - FIRST 15 MIN: Performed by: ORTHOPAEDIC SURGERY

## 2023-10-18 PROCEDURE — 72170 X-RAY EXAM OF PELVIS: CPT

## 2023-10-18 PROCEDURE — 6360000002 HC RX W HCPCS: Performed by: ORTHOPAEDIC SURGERY

## 2023-10-18 PROCEDURE — 2720000010 HC SURG SUPPLY STERILE: Performed by: ORTHOPAEDIC SURGERY

## 2023-10-18 PROCEDURE — 6360000002 HC RX W HCPCS: Performed by: NURSE ANESTHETIST, CERTIFIED REGISTERED

## 2023-10-18 PROCEDURE — 2500000003 HC RX 250 WO HCPCS: Performed by: NURSE ANESTHETIST, CERTIFIED REGISTERED

## 2023-10-18 PROCEDURE — 2580000003 HC RX 258: Performed by: NURSE ANESTHETIST, CERTIFIED REGISTERED

## 2023-10-18 PROCEDURE — C1776 JOINT DEVICE (IMPLANTABLE): HCPCS | Performed by: ORTHOPAEDIC SURGERY

## 2023-10-18 DEVICE — C-STEM VOID CENTRALISER 10MM
Type: IMPLANTABLE DEVICE | Site: HIP | Status: FUNCTIONAL
Brand: C-STEM

## 2023-10-18 DEVICE — HIP H2 TOT ADV OTHER HD IMPL CAPPED SYNTHES: Type: IMPLANTABLE DEVICE | Site: HIP | Status: FUNCTIONAL

## 2023-10-18 DEVICE — C-STEM AMT CEMENTED STEM HIGH OFFSET SIZE 1 12/14 TAPER
Type: IMPLANTABLE DEVICE | Site: HIP | Status: FUNCTIONAL
Brand: C-STEM

## 2023-10-18 DEVICE — BIOLOX DELTA CERAMIC FEMORAL HEAD +5.0 36MM DIA 12/14 TAPER
Type: IMPLANTABLE DEVICE | Site: HIP | Status: FUNCTIONAL
Brand: BIOLOX DELTA

## 2023-10-18 DEVICE — PINNACLE HIP SOLUTIONS ALTRX POLYETHYLENE ACETABULAR LINER NEUTRAL 36MM ID 52MM OD
Type: IMPLANTABLE DEVICE | Site: HIP | Status: FUNCTIONAL
Brand: PINNACLE ALTRX

## 2023-10-18 DEVICE — CEMENT BNE 40GM FULL DOSE PMMA W/O ANTIBIO M VISC RADPQ: Type: IMPLANTABLE DEVICE | Site: HIP | Status: FUNCTIONAL

## 2023-10-18 DEVICE — PINNACLE POROCOAT ACETABULAR SHELL SECTOR II 52MM OD
Type: IMPLANTABLE DEVICE | Site: HIP | Status: FUNCTIONAL
Brand: PINNACLE POROCOAT

## 2023-10-18 RX ORDER — PROMETHAZINE HYDROCHLORIDE 25 MG/1
12.5 TABLET ORAL EVERY 6 HOURS PRN
Status: DISCONTINUED | OUTPATIENT
Start: 2023-10-18 | End: 2023-10-20 | Stop reason: HOSPADM

## 2023-10-18 RX ORDER — FENTANYL CITRATE 50 UG/ML
25 INJECTION, SOLUTION INTRAMUSCULAR; INTRAVENOUS EVERY 5 MIN PRN
Status: DISCONTINUED | OUTPATIENT
Start: 2023-10-18 | End: 2023-10-18 | Stop reason: HOSPADM

## 2023-10-18 RX ORDER — ONDANSETRON 2 MG/ML
4 INJECTION INTRAMUSCULAR; INTRAVENOUS EVERY 6 HOURS PRN
Status: DISCONTINUED | OUTPATIENT
Start: 2023-10-18 | End: 2023-10-20 | Stop reason: HOSPADM

## 2023-10-18 RX ORDER — LIDOCAINE HYDROCHLORIDE 10 MG/ML
1 INJECTION, SOLUTION EPIDURAL; INFILTRATION; INTRACAUDAL; PERINEURAL
Status: DISCONTINUED | OUTPATIENT
Start: 2023-10-18 | End: 2023-10-18 | Stop reason: HOSPADM

## 2023-10-18 RX ORDER — MELOXICAM 7.5 MG/1
7.5 TABLET ORAL DAILY
Status: DISCONTINUED | OUTPATIENT
Start: 2023-10-18 | End: 2023-10-20 | Stop reason: HOSPADM

## 2023-10-18 RX ORDER — SODIUM CHLORIDE, SODIUM LACTATE, POTASSIUM CHLORIDE, CALCIUM CHLORIDE 600; 310; 30; 20 MG/100ML; MG/100ML; MG/100ML; MG/100ML
INJECTION, SOLUTION INTRAVENOUS CONTINUOUS
Status: DISCONTINUED | OUTPATIENT
Start: 2023-10-18 | End: 2023-10-18 | Stop reason: HOSPADM

## 2023-10-18 RX ORDER — ACETAMINOPHEN 325 MG/1
650 TABLET ORAL EVERY 6 HOURS
Status: DISCONTINUED | OUTPATIENT
Start: 2023-10-18 | End: 2023-10-20 | Stop reason: HOSPADM

## 2023-10-18 RX ORDER — LIDOCAINE HYDROCHLORIDE 20 MG/ML
INJECTION, SOLUTION EPIDURAL; INFILTRATION; INTRACAUDAL; PERINEURAL PRN
Status: DISCONTINUED | OUTPATIENT
Start: 2023-10-18 | End: 2023-10-18 | Stop reason: SDUPTHER

## 2023-10-18 RX ORDER — TROSPIUM CHLORIDE 20 MG/1
20 TABLET, FILM COATED ORAL
Status: DISCONTINUED | OUTPATIENT
Start: 2023-10-18 | End: 2023-10-20 | Stop reason: HOSPADM

## 2023-10-18 RX ORDER — TRAMADOL HYDROCHLORIDE 50 MG/1
50 TABLET ORAL EVERY 6 HOURS PRN
Status: DISCONTINUED | OUTPATIENT
Start: 2023-10-18 | End: 2023-10-20 | Stop reason: HOSPADM

## 2023-10-18 RX ORDER — ACETAMINOPHEN 325 MG/1
975 TABLET ORAL ONCE
Status: COMPLETED | OUTPATIENT
Start: 2023-10-18 | End: 2023-10-18

## 2023-10-18 RX ORDER — OXYMETAZOLINE HYDROCHLORIDE 0.05 G/100ML
1 SPRAY NASAL 2 TIMES DAILY PRN
Status: DISCONTINUED | OUTPATIENT
Start: 2023-10-18 | End: 2023-10-20 | Stop reason: HOSPADM

## 2023-10-18 RX ORDER — DULOXETIN HYDROCHLORIDE 30 MG/1
30 CAPSULE, DELAYED RELEASE ORAL DAILY
Status: DISCONTINUED | OUTPATIENT
Start: 2023-10-18 | End: 2023-10-20 | Stop reason: HOSPADM

## 2023-10-18 RX ORDER — TRAMADOL HYDROCHLORIDE 50 MG/1
100 TABLET ORAL EVERY 6 HOURS PRN
Status: DISCONTINUED | OUTPATIENT
Start: 2023-10-18 | End: 2023-10-20 | Stop reason: HOSPADM

## 2023-10-18 RX ORDER — BACLOFEN 10 MG/1
10 TABLET ORAL
Status: DISCONTINUED | OUTPATIENT
Start: 2023-10-18 | End: 2023-10-20 | Stop reason: HOSPADM

## 2023-10-18 RX ORDER — PROPOFOL 10 MG/ML
INJECTION, EMULSION INTRAVENOUS CONTINUOUS PRN
Status: DISCONTINUED | OUTPATIENT
Start: 2023-10-18 | End: 2023-10-18 | Stop reason: SDUPTHER

## 2023-10-18 RX ORDER — ASPIRIN 81 MG/1
81 TABLET ORAL 2 TIMES DAILY
Status: DISCONTINUED | OUTPATIENT
Start: 2023-10-18 | End: 2023-10-20 | Stop reason: HOSPADM

## 2023-10-18 RX ORDER — DIPHENHYDRAMINE HYDROCHLORIDE 50 MG/ML
12.5 INJECTION INTRAMUSCULAR; INTRAVENOUS
Status: DISCONTINUED | OUTPATIENT
Start: 2023-10-18 | End: 2023-10-18 | Stop reason: HOSPADM

## 2023-10-18 RX ORDER — MIDAZOLAM HYDROCHLORIDE 1 MG/ML
INJECTION INTRAMUSCULAR; INTRAVENOUS PRN
Status: DISCONTINUED | OUTPATIENT
Start: 2023-10-18 | End: 2023-10-18 | Stop reason: SDUPTHER

## 2023-10-18 RX ORDER — BUPIVACAINE HYDROCHLORIDE 2.5 MG/ML
INJECTION, SOLUTION EPIDURAL; INFILTRATION; INTRACAUDAL PRN
Status: DISCONTINUED | OUTPATIENT
Start: 2023-10-18 | End: 2023-10-18 | Stop reason: SDUPTHER

## 2023-10-18 RX ORDER — SODIUM CHLORIDE 0.9 % (FLUSH) 0.9 %
5-40 SYRINGE (ML) INJECTION PRN
Status: DISCONTINUED | OUTPATIENT
Start: 2023-10-18 | End: 2023-10-20 | Stop reason: HOSPADM

## 2023-10-18 RX ORDER — FENTANYL CITRATE 50 UG/ML
INJECTION, SOLUTION INTRAMUSCULAR; INTRAVENOUS PRN
Status: DISCONTINUED | OUTPATIENT
Start: 2023-10-18 | End: 2023-10-18 | Stop reason: SDUPTHER

## 2023-10-18 RX ORDER — MAGNESIUM HYDROXIDE/ALUMINUM HYDROXICE/SIMETHICONE 120; 1200; 1200 MG/30ML; MG/30ML; MG/30ML
15 SUSPENSION ORAL EVERY 6 HOURS PRN
Status: DISCONTINUED | OUTPATIENT
Start: 2023-10-18 | End: 2023-10-20 | Stop reason: HOSPADM

## 2023-10-18 RX ORDER — ONDANSETRON 2 MG/ML
INJECTION INTRAMUSCULAR; INTRAVENOUS PRN
Status: DISCONTINUED | OUTPATIENT
Start: 2023-10-18 | End: 2023-10-18 | Stop reason: SDUPTHER

## 2023-10-18 RX ORDER — SODIUM CHLORIDE 0.9 % (FLUSH) 0.9 %
5-40 SYRINGE (ML) INJECTION EVERY 12 HOURS SCHEDULED
Status: DISCONTINUED | OUTPATIENT
Start: 2023-10-18 | End: 2023-10-20 | Stop reason: HOSPADM

## 2023-10-18 RX ORDER — SODIUM CHLORIDE 9 MG/ML
INJECTION, SOLUTION INTRAVENOUS PRN
Status: DISCONTINUED | OUTPATIENT
Start: 2023-10-18 | End: 2023-10-20 | Stop reason: HOSPADM

## 2023-10-18 RX ORDER — LOSARTAN POTASSIUM 50 MG/1
100 TABLET ORAL DAILY
Status: DISCONTINUED | OUTPATIENT
Start: 2023-10-18 | End: 2023-10-20 | Stop reason: HOSPADM

## 2023-10-18 RX ORDER — HYDROXYZINE HYDROCHLORIDE 10 MG/1
10 TABLET, FILM COATED ORAL EVERY 8 HOURS PRN
Status: DISCONTINUED | OUTPATIENT
Start: 2023-10-18 | End: 2023-10-20 | Stop reason: HOSPADM

## 2023-10-18 RX ORDER — SODIUM CHLORIDE, SODIUM LACTATE, POTASSIUM CHLORIDE, CALCIUM CHLORIDE 600; 310; 30; 20 MG/100ML; MG/100ML; MG/100ML; MG/100ML
INJECTION, SOLUTION INTRAVENOUS CONTINUOUS
Status: DISCONTINUED | OUTPATIENT
Start: 2023-10-18 | End: 2023-10-20 | Stop reason: HOSPADM

## 2023-10-18 RX ORDER — BUMETANIDE 1 MG/1
0.5 TABLET ORAL DAILY
Status: DISCONTINUED | OUTPATIENT
Start: 2023-10-18 | End: 2023-10-20 | Stop reason: HOSPADM

## 2023-10-18 RX ORDER — DEXAMETHASONE SODIUM PHOSPHATE 4 MG/ML
INJECTION, SOLUTION INTRA-ARTICULAR; INTRALESIONAL; INTRAMUSCULAR; INTRAVENOUS; SOFT TISSUE PRN
Status: DISCONTINUED | OUTPATIENT
Start: 2023-10-18 | End: 2023-10-18 | Stop reason: SDUPTHER

## 2023-10-18 RX ORDER — METOPROLOL SUCCINATE 25 MG/1
25 TABLET, EXTENDED RELEASE ORAL DAILY
Status: DISCONTINUED | OUTPATIENT
Start: 2023-10-18 | End: 2023-10-20 | Stop reason: HOSPADM

## 2023-10-18 RX ORDER — PANTOPRAZOLE SODIUM 40 MG/1
40 TABLET, DELAYED RELEASE ORAL
Status: DISCONTINUED | OUTPATIENT
Start: 2023-10-19 | End: 2023-10-20 | Stop reason: HOSPADM

## 2023-10-18 RX ORDER — ROSUVASTATIN CALCIUM 10 MG/1
10 TABLET, COATED ORAL DAILY
Status: DISCONTINUED | OUTPATIENT
Start: 2023-10-18 | End: 2023-10-20 | Stop reason: HOSPADM

## 2023-10-18 RX ORDER — TRANEXAMIC ACID 100 MG/ML
INJECTION, SOLUTION INTRAVENOUS PRN
Status: DISCONTINUED | OUTPATIENT
Start: 2023-10-18 | End: 2023-10-18 | Stop reason: SDUPTHER

## 2023-10-18 RX ORDER — ONDANSETRON 2 MG/ML
4 INJECTION INTRAMUSCULAR; INTRAVENOUS
Status: DISCONTINUED | OUTPATIENT
Start: 2023-10-18 | End: 2023-10-18 | Stop reason: HOSPADM

## 2023-10-18 RX ORDER — FENTANYL CITRATE 50 UG/ML
100 INJECTION, SOLUTION INTRAMUSCULAR; INTRAVENOUS
Status: DISCONTINUED | OUTPATIENT
Start: 2023-10-18 | End: 2023-10-18 | Stop reason: HOSPADM

## 2023-10-18 RX ORDER — AMLODIPINE BESYLATE 5 MG/1
5 TABLET ORAL DAILY
Status: DISCONTINUED | OUTPATIENT
Start: 2023-10-19 | End: 2023-10-20 | Stop reason: HOSPADM

## 2023-10-18 RX ORDER — MIDAZOLAM HYDROCHLORIDE 2 MG/2ML
2 INJECTION, SOLUTION INTRAMUSCULAR; INTRAVENOUS
Status: DISCONTINUED | OUTPATIENT
Start: 2023-10-18 | End: 2023-10-18 | Stop reason: HOSPADM

## 2023-10-18 RX ORDER — POLYETHYLENE GLYCOL 3350 17 G/17G
17 POWDER, FOR SOLUTION ORAL DAILY
Status: DISCONTINUED | OUTPATIENT
Start: 2023-10-18 | End: 2023-10-20 | Stop reason: HOSPADM

## 2023-10-18 RX ADMIN — ONDANSETRON HYDROCHLORIDE 4 MG: 2 SOLUTION INTRAMUSCULAR; INTRAVENOUS at 12:10

## 2023-10-18 RX ADMIN — CEFAZOLIN SODIUM 2000 MG: 1 POWDER, FOR SOLUTION INTRAMUSCULAR; INTRAVENOUS at 12:07

## 2023-10-18 RX ADMIN — BUPIVACAINE HYDROCHLORIDE 20 ML: 2.5 INJECTION, SOLUTION EPIDURAL; INFILTRATION; INTRACAUDAL; PERINEURAL at 11:19

## 2023-10-18 RX ADMIN — SODIUM CHLORIDE, PRESERVATIVE FREE 10 ML: 5 INJECTION INTRAVENOUS at 20:17

## 2023-10-18 RX ADMIN — ACETAMINOPHEN 650 MG: 325 TABLET ORAL at 17:27

## 2023-10-18 RX ADMIN — HYDROMORPHONE HYDROCHLORIDE 0.5 MG: 1 INJECTION, SOLUTION INTRAMUSCULAR; INTRAVENOUS; SUBCUTANEOUS at 18:59

## 2023-10-18 RX ADMIN — ACETAMINOPHEN 650 MG: 325 TABLET ORAL at 22:24

## 2023-10-18 RX ADMIN — FENTANYL CITRATE 50 MCG: 50 INJECTION, SOLUTION INTRAMUSCULAR; INTRAVENOUS at 11:15

## 2023-10-18 RX ADMIN — BACLOFEN 10 MG: 10 TABLET ORAL at 20:14

## 2023-10-18 RX ADMIN — ASPIRIN 81 MG: 81 TABLET, COATED ORAL at 20:14

## 2023-10-18 RX ADMIN — ACETAMINOPHEN 975 MG: 325 TABLET ORAL at 09:54

## 2023-10-18 RX ADMIN — PHENYLEPHRINE HYDROCHLORIDE 30 MCG/MIN: 10 INJECTION INTRAVENOUS at 12:33

## 2023-10-18 RX ADMIN — DEXAMETHASONE SODIUM PHOSPHATE 4 MG: 4 INJECTION, SOLUTION INTRAMUSCULAR; INTRAVENOUS at 12:10

## 2023-10-18 RX ADMIN — MIDAZOLAM HYDROCHLORIDE 2 MG: 1 INJECTION, SOLUTION INTRAMUSCULAR; INTRAVENOUS at 11:12

## 2023-10-18 RX ADMIN — LIDOCAINE HYDROCHLORIDE 40 MG: 20 INJECTION, SOLUTION EPIDURAL; INFILTRATION; INTRACAUDAL; PERINEURAL at 12:03

## 2023-10-18 RX ADMIN — FENTANYL CITRATE 50 MCG: 50 INJECTION, SOLUTION INTRAMUSCULAR; INTRAVENOUS at 11:12

## 2023-10-18 RX ADMIN — WATER 2000 MG: 1 INJECTION INTRAMUSCULAR; INTRAVENOUS; SUBCUTANEOUS at 20:14

## 2023-10-18 RX ADMIN — SODIUM CHLORIDE, POTASSIUM CHLORIDE, SODIUM LACTATE AND CALCIUM CHLORIDE: 600; 310; 30; 20 INJECTION, SOLUTION INTRAVENOUS at 09:50

## 2023-10-18 RX ADMIN — HYDROMORPHONE HYDROCHLORIDE 0.5 MG: 1 INJECTION, SOLUTION INTRAMUSCULAR; INTRAVENOUS; SUBCUTANEOUS at 15:40

## 2023-10-18 RX ADMIN — TRANEXAMIC ACID 1000 MG: 100 INJECTION, SOLUTION INTRAVENOUS at 12:03

## 2023-10-18 RX ADMIN — PROPOFOL 100 MCG/KG/MIN: 10 INJECTION, EMULSION INTRAVENOUS at 12:03

## 2023-10-18 RX ADMIN — SODIUM CHLORIDE, POTASSIUM CHLORIDE, SODIUM LACTATE AND CALCIUM CHLORIDE: 600; 310; 30; 20 INJECTION, SOLUTION INTRAVENOUS at 20:19

## 2023-10-18 ASSESSMENT — PAIN DESCRIPTION - LOCATION
LOCATION: HIP

## 2023-10-18 ASSESSMENT — PAIN DESCRIPTION - ORIENTATION
ORIENTATION: LEFT
ORIENTATION: LEFT;LOWER
ORIENTATION: LEFT

## 2023-10-18 ASSESSMENT — PAIN SCALES - GENERAL
PAINLEVEL_OUTOF10: 10
PAINLEVEL_OUTOF10: 5
PAINLEVEL_OUTOF10: 3
PAINLEVEL_OUTOF10: 8
PAINLEVEL_OUTOF10: 3

## 2023-10-18 ASSESSMENT — PAIN DESCRIPTION - DESCRIPTORS
DESCRIPTORS: ACHING

## 2023-10-18 ASSESSMENT — PAIN DESCRIPTION - FREQUENCY
FREQUENCY: CONTINUOUS
FREQUENCY: INTERMITTENT
FREQUENCY: CONTINUOUS

## 2023-10-18 ASSESSMENT — PAIN - FUNCTIONAL ASSESSMENT
PAIN_FUNCTIONAL_ASSESSMENT: PREVENTS OR INTERFERES SOME ACTIVE ACTIVITIES AND ADLS
PAIN_FUNCTIONAL_ASSESSMENT: ACTIVITIES ARE NOT PREVENTED
PAIN_FUNCTIONAL_ASSESSMENT: 0-10
PAIN_FUNCTIONAL_ASSESSMENT: ACTIVITIES ARE NOT PREVENTED

## 2023-10-18 ASSESSMENT — PAIN DESCRIPTION - ONSET
ONSET: ON-GOING

## 2023-10-18 ASSESSMENT — PAIN DESCRIPTION - PAIN TYPE
TYPE: SURGICAL PAIN

## 2023-10-18 NOTE — H&P
Orthopaedic PRE-OP Admission History and Physical        Subjective:   Patient is a 82 y.o.  female who presented for left hip pain. The patient was evaluated and determined the most appropriate plan of care is to proceed with surgical intervention. Conservative measures were not indicated or successful.          Patient Active Problem List    Diagnosis Date Noted    Left sided numbness 02/05/2023    Peripheral vascular disease ([address]) 10/15/2019    DDD (degenerative disc disease), lumbar 05/08/2018    Obesity, morbid ([address]) 12/19/2017    FH: breast cancer in first degree relative 12/19/2017    Allergic to tetanus vaccine 08/09/2016    Hypercalcemia 02/09/2016    Anxiety 04/28/2015    Urinary incontinence 11/18/2014    Advance directive discussed with patient 10/07/2014    Health care maintenance 10/07/2014    Osteoporosis 10/15/2012    Lipid disorder 10/10/2012    Knee pain, right 10/09/2012    Osteoarthritis 10/09/2012    Hyperglycemia 03/15/2012    Essential hypertension with goal blood pressure less than 140/90 03/17/2011    Colon polyp 03/17/2011    S/P MARISEL-BSO 03/17/2011    Chest pain 03/17/2011    ALLEN (obstructive sleep apnea) 07/15/2010    GERD (gastroesophageal reflux disease) 07/15/2010    Pseudogout 07/15/2010    Depression 07/15/2010     Past Medical History:   Diagnosis Date    CVA (cerebral vascular accident) ([address])     never had any symptoms; old infarct found on 2/2023 scan    Depression 7/15/2010    GERD (gastroesophageal reflux disease) 7/15/2010    High cholesterol 7/15/2010    HTN (hypertension)     Obesity (BMI 30-39.9)     ALLEN (obstructive sleep apnea) 7/15/2010    Osteopenia 7/15/2010    Palpitations     Pseudogout 7/15/2010    PVD (peripheral vascular disease) ([address])     arterial disease right lower leg      Past Surgical History:   Procedure Laterality Date    ANTERIOR CERVICAL DISCECTOMY W/ FUSION      COLONOSCOPY  2006    Dr. Marilynn Krishnan  late 20's    MARISEL, one ovary intact, due to bleeding (PRILOSEC) 40 MG delayed release capsule Take 1 capsule by mouth daily    Automatic Reconciliation, Ar   traMADol (ULTRAM) 50 MG tablet TAKE 1 TABLET BY MOUTH EVERY 6 HRS AS NEEDED FOR PAIN 6/23/18   Automatic Reconciliation, Ar     No current facility-administered medications for this encounter. Current Outpatient Medications   Medication Sig    chlorhexidine gluconate (ANTISEPTIC SKIN CLEANSER) 4 % SOLN external solution Use for 2 days in addition to the 3 day course as instructed by nurse at Preadmission testing (5 days total)    amLODIPine (NORVASC) 10 MG tablet Take 0.5 tablets by mouth daily    alendronate (FOSAMAX) 70 MG tablet Take 1 tablet by mouth every 7 days    baclofen (LIORESAL) 10 MG tablet Take 1 tablet by mouth nightly    solifenacin (VESICARE) 10 MG tablet Take 0.5 tablets by mouth nightly    Oxymetazoline HCl (NASAL SPRAY NA) 1 spray by Nasal route as needed    docusate sodium (COLACE) 100 MG capsule Take 1 capsule by mouth as needed for Constipation    rosuvastatin (CRESTOR) 10 MG tablet Take 1 tablet by mouth daily    acetaminophen (TYLENOL) 650 MG extended release tablet Take 2 tablets by mouth daily    aspirin 81 MG chewable tablet Take 1 tablet by mouth daily    bumetanide (BUMEX) 0.5 MG tablet Take 1 tablet by mouth daily    DULoxetine (CYMBALTA) 30 MG extended release capsule Take 1 capsule by mouth daily    losartan (COZAAR) 100 MG tablet TAKE 1 TABLET BY MOUTH DAILY.     metoprolol succinate (TOPROL XL) 25 MG extended release tablet Take 1 tablet by mouth daily    omeprazole (PRILOSEC) 40 MG delayed release capsule Take 1 capsule by mouth daily    traMADol (ULTRAM) 50 MG tablet TAKE 1 TABLET BY MOUTH EVERY 6 HRS AS NEEDED FOR PAIN      Allergies   Allergen Reactions    Atorvastatin Other (See Comments)     Aches    Colesevelam Other (See Comments)     Aches    Gabapentin Other (See Comments)     headache    Oxycodone Swelling    Sulfamethoxazole-Trimethoprim Swelling

## 2023-10-18 NOTE — PROGRESS NOTES
Pharmacy Note - Age dose adjustment made per P/T protocol    Original order:  Sanctura 20 mg PO BID    Estimated Creatinine Clearance: 39 mL/min (A) (based on SCr of 1.15 mg/dL (H)). No results for input(s): \"BUN\", \"CREATININE\" in the last 72 hours. Age adjusted order (> 75): Sanctura 20 mg Daily    Please call pharmacy with any questions.     Thank you,  Angel Jhaveri, [facility] [place]  10/18/2023 5:59 PM

## 2023-10-18 NOTE — ANESTHESIA POSTPROCEDURE EVALUATION
Department of Anesthesiology  Postprocedure Note    Patient:  Gala Booth  MRN: [id number]  YOB: 1940  Date of evaluation: 10/18/2023      Procedure Summary     Date: 10/18/23 Room / Location: [facility] ASU OR  / [facility] AMBULATORY OR    Anesthesia Start: 1158 Anesthesia Stop: 1419    Procedure: LEFT TOTAL HIP REPLACEMENT ANTERIOR APPROACH (Left: Hip) Diagnosis:       Primary osteoarthritis of left hip      (Primary osteoarthritis of left hip [M16.12])    Surgeons: Jorge Ray MD Responsible Provider: Keegan Ogden DO    Anesthesia Type: MAC, Spinal ASA Status: 3          Anesthesia Type: MAC, Spinal    Estephania Phase I: Estephania Score: 9    Estephania Phase II:        Anesthesia Post Evaluation    Patient location during evaluation: PACU  Patient participation: complete - patient participated  Level of consciousness: awake  Pain score: 0  Airway patency: patent  Nausea & Vomiting: no nausea and no vomiting  Complications: no  Cardiovascular status: blood pressure returned to baseline  Respiratory status: acceptable  Hydration status: euvolemic  Multimodal analgesia pain management approach  Pain management: adequate

## 2023-10-18 NOTE — OP NOTE
OPERATIVE REPORT    FACILITY: [facility]    PATIENT NAME: Janessa Varela     DATE OF OPERATION: 10/18/2023    PREOPERATIVE DIAGNOSIS: Left hip arthritis    POSTOPERATIVE DIAGNOSIS: same    OPERATIVE PROCEDURE:   1. Left total hip arthroplasty - CPT 21712  2. Fluoroscopy, directed by and interpreted by surgeon - CPT 85474    ATTENDING PHYSICIAN: Martha Zaragoza. Graeme Araya MD    ASSISTANT:   Randy Grover,   [place], [place]    IMPLANTS:    Implant Name Type Inv. Item Serial No.  Lot No. LRB No. Used Action   LINER ACET OD52MM [id number] HIP ALTRX PINN - SNA  LINER ACET OD52MM [id number] HIP ALTRX PINN NA [facility] [id number] Left 1 Implanted   SHELL ACET [id number] HIP PORCOAT [place] SECT PRESSFIT PINN - SNA  SHELL ACET [id number] HIP PORCOAT [place] SECT PRESSFIT PINN NA [facility] [employer] [facility] [id number] Left 1 Implanted   [employer] bioprep bone preparation kit small cement restrictor ref [id number]   NA [employer] [id number] Left 1 Implanted   CEMENT BNE 40GM FULL DOSE PMMA W/O ANTIBIO M VISC RADPQ - SNA  CEMENT BNE 40GM FULL DOSE PMMA W/O ANTIBIO M VISC RADPQ NA [employer] [facility] [id number] Left 1 Implanted   CENTRALIZER STEM [id number] DST PMMA VOID C STEM - SNA  CENTRALIZER STEM [id number] DST PMMA VOID C STEM NA [facility] [employer] [facility] [id number] Left 1 Implanted   C-STEM AMT SZ1 HI OFFSET - SNA  C-STEM AMT SZ1 HI OFFSET NA [facility] [facility] [id number] Left 1 Implanted   HEAD FEM [id number] +5MM OFFSET 12/14 TAPR HIP CERAMIC BIOLOX - SNA  HEAD FEM [id number] +5MM OFFSET 12/14 TAPR HIP CERAMIC BIOLOX NA [facility] [employer] [facility] [id number] Left 1 Implanted       SPECIMENS:  none    OPERATIVE FINDINGS: Advanced arthritis of the operative hip    ANESTHESIA: Spinal + conscious sedation    FLUIDS:  Please see anesthesia record    ESTIMATED BLOOD LOSS: 250cc    INDICATIONS FOR PROCEDURE:   Lindsey Olivares is a 82 y.o. female who presented to clinic with left hip pain. fluoroscopic guidance to the desired depth, inclination, and anteversion. No supplemental screws were deemed necessary. A trial liner was placed. Attention was then turned to the femur. The superior capsular flap was used to peel the superior capsule from the femoral neck and sequential releases were completed posteriorly and medially along the inside of the greater trochanter with care taken to avoid release of the abductors and obturator externus. After releases allowed for adequate exposure of the proximal femur, retractors were placed on the medial and superolateral portions of the proximal femur. The canal was opened with a box osteotome and it was then sounded with a canal finder. Sequential broaching was completed up to size 1 stem. The calcar reamer was used to smooth rough bone edges down to the level of the broach. A trial neck and ball were placed and the hip was reduced. I felt that the combination of a high offset neck and +5 ball gave the best combination of offset, stability, leg lengths and restoration of hip mechanics. The trial components were removed and the real acetabular liner was placed. The femoral canal was washed and cleaned. A restrictor was placed for the cement. The cement was mixed on the back table, and placed within the canal. It was pressurized and the final cemented stem was placed and held firm until the cement was cured. The trial ball was put on the stem and a size 36+5mm head was selected. The final head was impacted in line with the neck after cleaning and drying the trunion. The components were reduced and final fluoroscopy was completed to confirm appropriate component position without apparent complication. The wound was then irrigated and closed in layers. A #1 vicryl stitch was used to reapproximate the capsule, followed by a #2 Quill stitch for the fascia overlying the TFL.  Subcutaneous closure was done with 2-0 vicryl stitch and 3-0 biosyn for the final skin

## 2023-10-18 NOTE — PERIOP NOTE
TRANSFER - OUT REPORT:    Verbal report given to Damien E Mannington, Fl 4, RN on Nath[employer]  being transferred to [address] [place] [address] for routine post-op       Report consisted of patient's Situation, Background, Assessment and   Recommendations(SBAR). Information from the following report(s) Nurse Handoff Report, Adult Overview, Surgery Report, Intake/Output, MAR, and Cardiac Rhythm NSR  was reviewed with the receiving nurse. Lines:   Peripheral IV 10/18/23 Distal;Posterior;Right Forearm (Active)   Site Assessment Clean, dry & intact 10/18/23 1415   Line Status Blood return noted; Infusing 10/18/23 1415   Line Care Connections checked and tightened 10/18/23 1415   Phlebitis Assessment No symptoms 10/18/23 1415   Infiltration Assessment 0 10/18/23 1415   Alcohol Cap Used Yes 10/18/23 1415   Dressing Status Clean, dry & intact 10/18/23 1415   Dressing Type Transparent 10/18/23 1415        Opportunity for questions and clarification was provided.       Patient transported with:  O2 @ 4lpm and Registered Nurse

## 2023-10-18 NOTE — ANESTHESIA PRE PROCEDURE
Department of Anesthesiology  Preprocedure Note       Name:  Rosa Garcia   Age:  82 y.o.  :  1940                                          MRN:  [id number]         Date:  10/18/2023      Surgeon: Ruddy Li):  Lynnette Hunt MD    Procedure: Procedure(s):  LEFT TOTAL HIP REPLACEMENT ANTERIOR APPROACH    Medications prior to admission:   Prior to Admission medications    Medication Sig Start Date End Date Taking? Authorizing Provider   chlorhexidine gluconate (ANTISEPTIC SKIN CLEANSER) 4 % SOLN external solution Use for 2 days in addition to the 3 day course as instructed by nurse at Preadmission testing (5 days total) 10/9/23   DONELL Al NP   amLODIPine (NORVASC) 10 MG tablet Take 0.5 tablets by mouth daily 10/9/23   Marcos Bello MD   alendronate (FOSAMAX) 70 MG tablet Take 1 tablet by mouth every 7 days    Vivian Coates MD   baclofen (LIORESAL) 10 MG tablet Take 1 tablet by mouth nightly    Vivian Coates MD   solifenacin (VESICARE) 10 MG tablet Take 0.5 tablets by mouth nightly  Patient not taking: Reported on 10/18/2023    ProviderVivian MD   Oxymetazoline HCl (NASAL SPRAY NA) 1 spray by Nasal route as needed    ProviderVivian MD   docusate sodium (COLACE) 100 MG capsule Take 1 capsule by mouth as needed for Constipation    ProviderVivian MD   rosuvastatin (CRESTOR) 10 MG tablet Take 1 tablet by mouth daily 5/10/23   Radha Napier MD   acetaminophen (TYLENOL) 650 MG extended release tablet Take 2 tablets by mouth daily    Automatic Reconciliation, Ar   aspirin 81 MG chewable tablet Take 1 tablet by mouth daily 3/17/11   Automatic Reconciliation, Ar   bumetanide (BUMEX) 0.5 MG tablet Take 1 tablet by mouth daily    Automatic Reconciliation, Ar   DULoxetine (CYMBALTA) 30 MG extended release capsule Take 1 capsule by mouth daily 18   Automatic Reconciliation, Ar   losartan (COZAAR) 100 MG tablet TAKE 1 TABLET BY MOUTH DAILY.  3/25/20   Automatic

## 2023-10-18 NOTE — ANESTHESIA PROCEDURE NOTES
Peripheral Block    Patient location during procedure: pre-op  Reason for block: procedure for pain, post-op pain management, primary anesthetic and at surgeon's request  Start time: 10/18/2023 11:12 AM  End time: 10/18/2023 11:19 AM  Staffing  Anesthesiologist: Theresa Cheng DO  Performed by: Theresa Cheng DO  Authorized by: Theresa Cheng DO    Preanesthetic Checklist  Completed: patient identified, IV checked, site marked, risks and benefits discussed, surgical/procedural consents, pre-op evaluation, timeout performed, anesthesia consent given, oxygen available and monitors applied/VS acknowledged  Peripheral Block   Patient position: supine  Prep: ChloraPrep  Provider prep: mask and sterile gloves  Patient monitoring: cardiac monitor, continuous pulse ox, continuous capnometry, frequent blood pressure checks, IV access, oxygen and responsive to questions  Block type: PENG  Laterality: left  Injection technique: single-shot  Guidance: ultrasound guided    Needle   Needle type: Other   Needle gauge: 21 G  Needle localization: ultrasound guidance  Needle length: 10 cmOther needle type: STIMUPLEX  Assessment   Injection assessment: negative aspiration for heme, no paresthesia on injection, local visualized surrounding nerve on ultrasound and no intravascular symptoms  Hemodynamics: stable  Outcomes: patient tolerated procedure well    Additional Notes  Silvia RODRIGUEZ witnessed timeout and block written on correct side.

## 2023-10-18 NOTE — BRIEF OP NOTE
Brief Postoperative Note      Patient:  Husam Martínez  YOB: 1940  MRN: [id number]    Date of Procedure: 10/18/2023    Pre-Op Diagnosis Codes:     * Primary osteoarthritis of left hip [M16.12]    Post-Op Diagnosis: Same       Procedure(s):  LEFT TOTAL HIP REPLACEMENT ANTERIOR APPROACH    Surgeon(s):  Rickie Kuo MD    Assistant:  Surgical Assistant: (Unknown)    Anesthesia: Spinal    Estimated Blood Loss (mL): 620ZO    Complications: None    Specimens:   * No specimens in log *    Implants:  * No surgical log found *      Drains: * No LDAs found *    Findings: advanced arthritis left hip      Electronically signed by Rickie Kuo MD on 10/18/2023 at 6:37 AM

## 2023-10-19 ENCOUNTER — APPOINTMENT (OUTPATIENT)
Facility: HOSPITAL | Age: 83
End: 2023-10-19
Attending: ORTHOPAEDIC SURGERY
Payer: MEDICARE

## 2023-10-19 LAB
ANION GAP SERPL CALC-SCNC: 6 MMOL/L (ref 5–15)
BUN SERPL-MCNC: 21 MG/DL (ref 6–20)
BUN/CREAT SERPL: 23 (ref 12–20)
CALCIUM SERPL-MCNC: 8.7 MG/DL (ref 8.5–10.1)
CHLORIDE SERPL-SCNC: 107 MMOL/L (ref 97–108)
CO2 SERPL-SCNC: 26 MMOL/L (ref 21–32)
CREAT SERPL-MCNC: 0.9 MG/DL (ref 0.55–1.02)
ERYTHROCYTE [DISTWIDTH] IN BLOOD BY AUTOMATED COUNT: 14.7 % (ref 11.5–14.5)
GLUCOSE SERPL-MCNC: 148 MG/DL (ref 65–100)
HCT VFR BLD AUTO: 32.4 % (ref 35–47)
HGB BLD-MCNC: 10.1 G/DL (ref 11.5–16)
MCH RBC QN AUTO: 27.5 PG (ref 26–34)
MCHC RBC AUTO-ENTMCNC: 31.2 G/DL (ref 30–36.5)
MCV RBC AUTO: 88.3 FL (ref 80–99)
NRBC # BLD: 0 K/UL (ref 0–0.01)
NRBC BLD-RTO: 0 PER 100 WBC
PLATELET # BLD AUTO: 219 K/UL (ref 150–400)
PMV BLD AUTO: 10 FL (ref 8.9–12.9)
POTASSIUM SERPL-SCNC: 4.5 MMOL/L (ref 3.5–5.1)
RBC # BLD AUTO: 3.67 M/UL (ref 3.8–5.2)
SODIUM SERPL-SCNC: 139 MMOL/L (ref 136–145)
WBC # BLD AUTO: 13.7 K/UL (ref 3.6–11)

## 2023-10-19 PROCEDURE — 72170 X-RAY EXAM OF PELVIS: CPT

## 2023-10-19 PROCEDURE — 6370000000 HC RX 637 (ALT 250 FOR IP): Performed by: ORTHOPAEDIC SURGERY

## 2023-10-19 PROCEDURE — 97530 THERAPEUTIC ACTIVITIES: CPT

## 2023-10-19 PROCEDURE — 2580000003 HC RX 258: Performed by: ORTHOPAEDIC SURGERY

## 2023-10-19 PROCEDURE — 94761 N-INVAS EAR/PLS OXIMETRY MLT: CPT

## 2023-10-19 PROCEDURE — 85027 COMPLETE CBC AUTOMATED: CPT

## 2023-10-19 PROCEDURE — 6360000002 HC RX W HCPCS: Performed by: NURSE PRACTITIONER

## 2023-10-19 PROCEDURE — 97116 GAIT TRAINING THERAPY: CPT

## 2023-10-19 PROCEDURE — 36415 COLL VENOUS BLD VENIPUNCTURE: CPT

## 2023-10-19 PROCEDURE — 6360000002 HC RX W HCPCS: Performed by: ORTHOPAEDIC SURGERY

## 2023-10-19 PROCEDURE — 6370000000 HC RX 637 (ALT 250 FOR IP): Performed by: NURSE PRACTITIONER

## 2023-10-19 PROCEDURE — 2700000000 HC OXYGEN THERAPY PER DAY

## 2023-10-19 PROCEDURE — 80048 BASIC METABOLIC PNL TOTAL CA: CPT

## 2023-10-19 PROCEDURE — 97165 OT EVAL LOW COMPLEX 30 MIN: CPT

## 2023-10-19 RX ORDER — HYDROMORPHONE HYDROCHLORIDE 2 MG/1
2 TABLET ORAL EVERY 4 HOURS PRN
Status: DISCONTINUED | OUTPATIENT
Start: 2023-10-19 | End: 2023-10-19

## 2023-10-19 RX ORDER — HYDROMORPHONE HYDROCHLORIDE 2 MG/1
1 TABLET ORAL
Status: DISCONTINUED | OUTPATIENT
Start: 2023-10-19 | End: 2023-10-20 | Stop reason: HOSPADM

## 2023-10-19 RX ORDER — HYDROMORPHONE HYDROCHLORIDE 2 MG/1
2 TABLET ORAL
Status: DISCONTINUED | OUTPATIENT
Start: 2023-10-19 | End: 2023-10-20 | Stop reason: HOSPADM

## 2023-10-19 RX ORDER — DEXAMETHASONE SODIUM PHOSPHATE 4 MG/ML
8 INJECTION, SOLUTION INTRA-ARTICULAR; INTRALESIONAL; INTRAMUSCULAR; INTRAVENOUS; SOFT TISSUE ONCE
Status: COMPLETED | OUTPATIENT
Start: 2023-10-19 | End: 2023-10-19

## 2023-10-19 RX ORDER — MELOXICAM 7.5 MG/1
7.5 TABLET ORAL DAILY
Qty: 30 TABLET | Refills: 3 | Status: SHIPPED | OUTPATIENT
Start: 2023-10-19

## 2023-10-19 RX ORDER — TRAMADOL HYDROCHLORIDE 50 MG/1
50 TABLET ORAL EVERY 4 HOURS PRN
Qty: 42 TABLET | Refills: 0 | Status: SHIPPED | OUTPATIENT
Start: 2023-10-19 | End: 2023-10-20 | Stop reason: HOSPADM

## 2023-10-19 RX ORDER — HYDROMORPHONE HYDROCHLORIDE 2 MG/1
1 TABLET ORAL EVERY 4 HOURS PRN
Status: DISCONTINUED | OUTPATIENT
Start: 2023-10-19 | End: 2023-10-19

## 2023-10-19 RX ORDER — BACLOFEN 10 MG/1
10 TABLET ORAL 3 TIMES DAILY PRN
Status: DISCONTINUED | OUTPATIENT
Start: 2023-10-19 | End: 2023-10-20 | Stop reason: HOSPADM

## 2023-10-19 RX ORDER — ASPIRIN 81 MG/1
81 TABLET, CHEWABLE ORAL 2 TIMES DAILY
Qty: 60 TABLET | Refills: 0 | Status: SHIPPED | OUTPATIENT
Start: 2023-10-19 | End: 2023-11-18

## 2023-10-19 RX ORDER — ACETAMINOPHEN 500 MG
500 TABLET ORAL 3 TIMES DAILY
Qty: 30 TABLET | Refills: 0 | Status: SHIPPED | OUTPATIENT
Start: 2023-10-19 | End: 2023-10-29

## 2023-10-19 RX ORDER — POLYETHYLENE GLYCOL 3350 17 G/17G
17 POWDER, FOR SOLUTION ORAL DAILY
Qty: 7 EACH | Refills: 0 | Status: SHIPPED | OUTPATIENT
Start: 2023-10-19 | End: 2023-10-26

## 2023-10-19 RX ADMIN — ASPIRIN 81 MG: 81 TABLET, COATED ORAL at 08:45

## 2023-10-19 RX ADMIN — DEXAMETHASONE SODIUM PHOSPHATE 8 MG: 4 INJECTION, SOLUTION INTRAMUSCULAR; INTRAVENOUS at 18:11

## 2023-10-19 RX ADMIN — SODIUM CHLORIDE, POTASSIUM CHLORIDE, SODIUM LACTATE AND CALCIUM CHLORIDE: 600; 310; 30; 20 INJECTION, SOLUTION INTRAVENOUS at 02:42

## 2023-10-19 RX ADMIN — ACETAMINOPHEN 650 MG: 325 TABLET ORAL at 08:44

## 2023-10-19 RX ADMIN — POLYETHYLENE GLYCOL 3350 17 G: 17 POWDER, FOR SOLUTION ORAL at 08:45

## 2023-10-19 RX ADMIN — PANTOPRAZOLE SODIUM 40 MG: 40 TABLET, DELAYED RELEASE ORAL at 06:21

## 2023-10-19 RX ADMIN — BUMETANIDE 0.5 MG: 1 TABLET ORAL at 08:45

## 2023-10-19 RX ADMIN — AMLODIPINE BESYLATE 5 MG: 5 TABLET ORAL at 08:45

## 2023-10-19 RX ADMIN — HYDROMORPHONE HYDROCHLORIDE 2 MG: 2 TABLET ORAL at 18:02

## 2023-10-19 RX ADMIN — LOSARTAN POTASSIUM 100 MG: 50 TABLET, FILM COATED ORAL at 08:44

## 2023-10-19 RX ADMIN — SODIUM CHLORIDE, PRESERVATIVE FREE 5 ML: 5 INJECTION INTRAVENOUS at 21:09

## 2023-10-19 RX ADMIN — ROSUVASTATIN CALCIUM 10 MG: 10 TABLET, COATED ORAL at 08:45

## 2023-10-19 RX ADMIN — ASPIRIN 81 MG: 81 TABLET, COATED ORAL at 21:07

## 2023-10-19 RX ADMIN — SODIUM CHLORIDE, PRESERVATIVE FREE 10 ML: 5 INJECTION INTRAVENOUS at 08:46

## 2023-10-19 RX ADMIN — TROSPIUM CHLORIDE 20 MG: 20 TABLET, FILM COATED ORAL at 15:46

## 2023-10-19 RX ADMIN — ACETAMINOPHEN 650 MG: 325 TABLET ORAL at 03:41

## 2023-10-19 RX ADMIN — METOPROLOL SUCCINATE 25 MG: 25 TABLET, EXTENDED RELEASE ORAL at 08:45

## 2023-10-19 RX ADMIN — WATER 2000 MG: 1 INJECTION INTRAMUSCULAR; INTRAVENOUS; SUBCUTANEOUS at 03:42

## 2023-10-19 RX ADMIN — TRAMADOL HYDROCHLORIDE 100 MG: 50 TABLET, FILM COATED ORAL at 08:44

## 2023-10-19 RX ADMIN — HYDROMORPHONE HYDROCHLORIDE 1 MG: 2 TABLET ORAL at 11:38

## 2023-10-19 RX ADMIN — HYDROMORPHONE HYDROCHLORIDE 0.5 MG: 1 INJECTION, SOLUTION INTRAMUSCULAR; INTRAVENOUS; SUBCUTANEOUS at 06:22

## 2023-10-19 RX ADMIN — HYDROMORPHONE HYDROCHLORIDE 2 MG: 2 TABLET ORAL at 21:07

## 2023-10-19 RX ADMIN — ACETAMINOPHEN 650 MG: 325 TABLET ORAL at 15:46

## 2023-10-19 RX ADMIN — HYDROMORPHONE HYDROCHLORIDE 2 MG: 2 TABLET ORAL at 14:21

## 2023-10-19 RX ADMIN — TRAMADOL HYDROCHLORIDE 100 MG: 50 TABLET, FILM COATED ORAL at 02:41

## 2023-10-19 RX ADMIN — ACETAMINOPHEN 650 MG: 325 TABLET ORAL at 21:08

## 2023-10-19 ASSESSMENT — PAIN SCALES - GENERAL
PAINLEVEL_OUTOF10: 9
PAINLEVEL_OUTOF10: 10
PAINLEVEL_OUTOF10: 8
PAINLEVEL_OUTOF10: 8
PAINLEVEL_OUTOF10: 2
PAINLEVEL_OUTOF10: 10
PAINLEVEL_OUTOF10: 7
PAINLEVEL_OUTOF10: 10
PAINLEVEL_OUTOF10: 7
PAINLEVEL_OUTOF10: 7
PAINLEVEL_OUTOF10: 0

## 2023-10-19 ASSESSMENT — PAIN DESCRIPTION - ORIENTATION
ORIENTATION: LEFT

## 2023-10-19 ASSESSMENT — PAIN DESCRIPTION - LOCATION
LOCATION: HIP

## 2023-10-19 ASSESSMENT — PAIN - FUNCTIONAL ASSESSMENT
PAIN_FUNCTIONAL_ASSESSMENT: PREVENTS OR INTERFERES SOME ACTIVE ACTIVITIES AND ADLS

## 2023-10-19 ASSESSMENT — PAIN DESCRIPTION - DESCRIPTORS
DESCRIPTORS: POUNDING
DESCRIPTORS: THROBBING
DESCRIPTORS: POUNDING
DESCRIPTORS: ACHING
DESCRIPTORS: THROBBING;ACHING
DESCRIPTORS: THROBBING

## 2023-10-19 ASSESSMENT — PAIN DESCRIPTION - PAIN TYPE
TYPE: SURGICAL PAIN

## 2023-10-19 ASSESSMENT — PAIN DESCRIPTION - FREQUENCY
FREQUENCY: INTERMITTENT

## 2023-10-19 ASSESSMENT — PAIN DESCRIPTION - ONSET
ONSET: ON-GOING

## 2023-10-19 NOTE — PLAN OF CARE
Problem: Safety - Adult  Goal: Free from fall injury  Outcome: Progressing     Problem: Pain  Goal: Verbalizes/displays adequate comfort level or baseline comfort level  Outcome: Progressing     Problem: Physical Therapy - Adult  Goal: By Discharge: Performs mobility at highest level of function for planned discharge setting. See evaluation for individualized goals. Description: FUNCTIONAL STATUS PRIOR TO ADMISSION: Patient was modified independent using a single point cane for functional mobility. HOME SUPPORT PRIOR TO ADMISSION: The patient lived with her daughter but did not require assistance. Physical Therapy Goals  Initiated 10/18/2023  1. Patient will move from supine to sit and sit to supine in bed with modified independence within 4 day(s). 2.  Patient will perform sit to stand with modified independence within 4 day(s). 3.  Patient will transfer from bed to chair and chair to bed with modified independence using the least restrictive device within 4 day(s). 4.  Patient will ambulate with modified independence for 150 feet with the least restrictive device within 4 day(s). 5.  Patient will ascend/descend 3 stairs with 1 handrail(s) with modified independence within 4 day(s). 6.  Patient will perform RADHA home exercise program per protocol with independence within 4 days.         10/18/2023 1731 by Noelle Mccartney PT  Outcome: Progressing     Problem: Discharge Planning  Goal: Discharge to home or other facility with appropriate resources  Outcome: Progressing

## 2023-10-19 NOTE — ACP (ADVANCE CARE PLANNING)
Advance Care Planning     Advance Care Planning Inpatient Note  Spiritual Care Department    Today's Date: 10/19/2023  Unit: [facility] B4 MULTI-SPECIALTY ORTHOPEDICS 1    Received request from IDT Member. Upon review of chart and communication with care team, patient's decision making abilities are not in question. . Patient and nurse,   was/were present in the room during visit. Goals of ACP Conversation:  Discuss advance care planning documents    Health Care Decision Makers:       Primary Decision Maker: Rosa Cervantes Child - [phone]    Secondary Decision Maker: Narcisa Joe Child - [phone]  Summary:  Completed New Documents  Updated Healthcare Decision Maker    Advance Care Planning Documents (Patient Wishes):  Healthcare Power of /Advance Directive Appointment of Health Care Agent  Living Will/Advance Directive     Assessment:  I received spiritual consult request. I followed up with patient [facility] after her surgery. I assisted the patient in completing new advance directive paperwork. Patient declared her daughter named Jesse Simon to be the primary decision maker. Patient declared Henderson Castleman. To be her secondary decision maker. Patient's son is the secondary decision maker. Upon entry patient communicated how she was recovering from surgery. Patient share medical and historical events. Patient is of the [facility] and attends [facility].  provided spiritual care which entailed prayers for the patient. Patient Marivel was thankful for the visit, prayers, and assistance in completing the AMD.    Interventions:  Provided education on documents for clarity and greater understanding  Assisted in the completion of documents according to patient's wishes at this time    Care Preferences Communicated:   No    Outcomes/Plan:  ACP Discussion: Completed  New advance directive completed.   Returned original document(s) to patient, as well as copies for distribution to appointed agents  Copy of advance directive given to staff to scan into medical record.     Electronically signed by Chaplain Vicenta on 10/19/2023 at 11:39 AM

## 2023-10-19 NOTE — CARE COORDINATION
10/19/2023  12:10 PM    Care Management Progress Note      ICD-10-CM    1. Arthritis of left hip  M16.12 traMADol (ULTRAM) 50 MG tablet          RUR:  NA - OBS  Risk Level: [x]Low []Moderate []High    Transition of care plan:  Discharge pending therapy clearance. Home. Per MD, no therapy treatment until after follow-up. Outpatient follow-up. Pt's family to transport. 10/19/23 1208   Service Assessment   Patient Orientation Alert and Oriented   Cognition Alert   History Provided By Medical Record   Primary Caregiver Self   Support Systems Children; Other (Comment)  (DTR)   [address] is: Legal Next of [address] [facility]   PCP Verified by CM Yes   Last Visit to PCP Within last 3 months   Prior Functional Level Independent in ADLs/IADLs   Current Functional Level Independent in ADLs/IADLs   Can patient return to prior living arrangement Yes   Ability to make needs known: Good   Family able to assist with home care needs: Yes   Would you like for me to discuss the discharge plan with any other family members/significant others, and if so, who?  Yes   Financial Resources Medicare   Community Resources None   Social/Functional History   Lives With Daughter   Type of [address]   Ambulation Assistance Independent   Transfer Assistance Independent   Discharge Planning   Type of Residence House   Living Arrangements Children   Current Services Prior To Admission None   Type of Home Care Services None   Patient expects to be discharged to: [facility] Discharge   Services [facility] Discharge None  (Per MD, no treatment until after follow-up.)   Mode of Transport at Discharge Other (see comment)   Confirm Follow Up Transport Family

## 2023-10-19 NOTE — PROGRESS NOTES
Notified by nursing that patient's pain is uncontrolled by PRN Tramadol. Patient with allergy to oxycodone. As discussed with Dr. Rickey Solo, will trial low dose PO Dilaudid for pain. I have ordered 1-2mg PRN q4h. Will continue to follow. 1420-Patient still with increased pain and unable to wait 4 hours between doses of medication. Dilaudid changed to q3h prn.    1545-Met with patient and her daughter at the bedside. Pain reports that the PO Dilaudid is not helping her pain at all. She was unable to participate with therapy this afternoon due to pain. Patient says pain is radiating all the way from her hip down to her foot. She is now also having hip/lower back spasms. Patient with +DF/PF. Good strength of operative extremity. SILT. Mepilex dressing to left anterior hip is CDI. Will obtain X-rays of the left hip at this time. Patient's home dose of nightly baclofen changed to TID prn for spasms. Discussed with Dr. Royal Alvarez recommends also adding a one time dose of Decadron (8mg IV). Orders placed accordingly. Will continue to monitor.     DONELL Carrasco - THOMAS

## 2023-10-19 NOTE — PROGRESS NOTES
Spiritual Care Assessment/Progress Note  [facility]    Name: Paras Fleming MRN: [id number]    Age: 82 y.o. Sex: female   Language: English     Date: 10/19/2023            Total Time Calculated: 45 min              Spiritual Assessment begun in [facility] B4 MULTI-SPECIALTY ORTHOPEDICS 1  Service Provided For[de-identified] Patient  Referral/Consult From[de-identified] Patient  Encounter Overview/Reason : Initial Encounter    Spiritual beliefs:      [x] Involved in a dexter tradition/spiritual practice:      [] Supported by a dexter community:      [] Claims no spiritual orientation:      [] Seeking spiritual identity:           [] Adheres to an individual form of spirituality:      [] Not able to assess:                Identified resources for coping and support system:   Support System: Family members       [x] Prayer                  [] Devotional reading               [] Music                  [] Guided Imagery     [] Pet visits                                        [x] AMD     Specific area/focus of visit   Encounter:    Crisis:    Spiritual/Emotional needs: Type: Spiritual Support  Ritual, Rites and Sacraments: Type:  (Pt requested a blessing by [employer])  Grief, Loss, and Adjustments:    Ethics/Mediation:    Behavioral Health:    Palliative Care: Advance Care Planning: Type: ACP conversation, Completed AD/ACP document(s)    Plan/Referrals:  (Contact Spiritual Care if further assistance is needed.)    Narrative: Chart reviewed. I received spiritual consult request for patient on specialty orthopedics floor. I followed up with patient Gaby Singer after her surgery. I assisted the patient in completing new advance directive paperwork. Patient declared her daughter named Lilia Martinez to be the primary decision maker. Patient declared Mateus Carrera To be her secondary decision maker. Patient's son is the secondary decision maker. Upon entry patient communicated how she was recovering from surgery.  Patient shared

## 2023-10-19 NOTE — PLAN OF CARE
Problem: Safety - Adult  Goal: Free from fall injury  10/19/2023 1215 by Princess Niranjan RN  Outcome: [address] Progressing  10/18/2023 2309 by Joanie Poe RN  Outcome: Progressing     Problem: Pain  Goal: Verbalizes/displays adequate comfort level or baseline comfort level  10/19/2023 1215 by Princess Niranjan RN  Outcome: [address] Progressing  10/18/2023 2309 by Joanie Poe RN  Outcome: Progressing     Problem: Physical Therapy - Adult  Goal: By Discharge: Performs mobility at highest level of function for planned discharge setting. See evaluation for individualized goals. Description: FUNCTIONAL STATUS PRIOR TO ADMISSION: Patient was modified independent using a single point cane for functional mobility. HOME SUPPORT PRIOR TO ADMISSION: The patient lived with her daughter but did not require assistance. Physical Therapy Goals  Initiated 10/18/2023  1. Patient will move from supine to sit and sit to supine in bed with modified independence within 4 day(s). 2.  Patient will perform sit to stand with modified independence within 4 day(s). 3.  Patient will transfer from bed to chair and chair to bed with modified independence using the least restrictive device within 4 day(s). 4.  Patient will ambulate with modified independence for 150 feet with the least restrictive device within 4 day(s). 5.  Patient will ascend/descend 3 stairs with 1 handrail(s) with modified independence within 4 day(s). 6.  Patient will perform RADHA home exercise program per protocol with independence within 4 days.         10/19/2023 1029 by Fady Renteria PT  Outcome: Progressing     Problem: Discharge Planning  Goal: Discharge to home or other facility with appropriate resources  10/19/2023 1215 by Princess Niranjan RN  Outcome: [address] Progressing  10/18/2023 2309 by Joanie Poe RN  Outcome: Progressing     Problem: Respiratory - Adult  Goal: Achieves optimal ventilation and oxygenation  Outcome:

## 2023-10-19 NOTE — PROGRESS NOTES
Orthopedic Rounding Note    Subjective:  Patient reports doing ok. Pain is moderate.     Objective:  Vitals:    10/19/23 0622   BP:    Pulse:    Resp: 16   Temp:    SpO2:        Recent Labs     10/19/23  0237   HGB 10.1*   HCT 32.4*      K 4.5      CO2 26   BUN 21*         Exam:    NAD   Breathing well on room air  Adequate perfusion in distal extremities    LLE:  Bandage C/D/I  Swelling within expected limits  No evidence of DVT  Palpable pedal pulse distally  Sensation intact to light touch in S/S/DP/SP/T nerve distributions  Motor intact to EHL/FHL/TA/GSC nerve distributions      Assessment:  S/p L RADHA    Plan:  Aspirin, SCDs for DVT prophylaxis  WBAT  PTOT  Continue in hospital care for now  Pain control  Anticipate discharge today       Orquidea Herrera MD

## 2023-10-19 NOTE — PROGRESS NOTES
Physical Therapy    Afternoon PT treatment attempted, however pt reports ongoing pain with inability to tolerate activity at this time; pt medicated approx 1 hour prior. RN informed. Will con't to follow.     Jevon Miller, PT, MPT

## 2023-10-19 NOTE — PLAN OF CARE
Problem: Physical Therapy - Adult  Goal: By Discharge: Performs mobility at highest level of function for planned discharge setting. See evaluation for individualized goals. Description: FUNCTIONAL STATUS PRIOR TO ADMISSION: Patient was modified independent using a single point cane for functional mobility. HOME SUPPORT PRIOR TO ADMISSION: The patient lived with her daughter but did not require assistance. Physical Therapy Goals  Initiated 10/18/2023  1. Patient will move from supine to sit and sit to supine in bed with modified independence within 4 day(s). 2.  Patient will perform sit to stand with modified independence within 4 day(s). 3.  Patient will transfer from bed to chair and chair to bed with modified independence using the least restrictive device within 4 day(s). 4.  Patient will ambulate with modified independence for 150 feet with the least restrictive device within 4 day(s). 5.  Patient will ascend/descend 3 stairs with 1 handrail(s) with modified independence within 4 day(s). 6.  Patient will perform RADHA home exercise program per protocol with independence within 4 days. Outcome: Progressing   PHYSICAL THERAPY TREATMENT    Patient: Rossana Brandt (82 y.o. female)  Date: 10/19/2023  Diagnosis: Primary osteoarthritis of left hip [M16.12]  Arthritis of left hip [M16.12] Arthritis of left hip  Procedure(s) (LRB):  LEFT TOTAL HIP REPLACEMENT ANTERIOR APPROACH (Left) 1 Day Post-Op  Precautions:                      ASSESSMENT:  Patient continues to benefit from skilled PT services and is progressing towards goals. Pt mobilized with CGA for transfer sit to stand from chair and bed height. Pt tolerance to gait training limited by high pain level L LE and onset of bilat LE weakness during amb this date. Noted significant SBP drop from sit to stand. Educated pt on s/s of orthostatic hypotension and associated fall risk.  Pt requested return to bed at end of session; applied ice to L (distance limited by c/o bilat LE weakness/ feeling shakey)  Assistive Device: Gait belt;Walker, rolling      Pain Rating:  Pt reports high pain level  Pain Intervention(s):   nursing notified, ice, rest, and repositioning    Activity Tolerance:   Fair  and signs and symptoms of orthostatic hypotension    After treatment:   Patient left in no apparent distress in bed, Call bell within reach, and Side rails x3      COMMUNICATION/EDUCATION:   The patient's plan of care was discussed with: registered nurse    Patient Education  Education Given To: Patient  Education Provided: Role of Therapy;Plan of Care; Fall Prevention Strategies  Education Method: Verbal  Barriers to Learning: None  Education Outcome: Verbalized understanding      Karina Caballero PT  Minutes: 17

## 2023-10-19 NOTE — PROGRESS NOTES
Rounded on patient  Patient alert and oriented  Follow up from pre-op Joint Patient Education Class. Patient lives with daughter and said she watched the video. Per patient her daughter has everything ready for her at home. Reviewed plan of care with patient, including pain management, positioning, mobility precautions,  Reviewed call don't fall expectations. Opportunity provided for patient to ask questions and provide comments. Questions answered. [place] at bedside  Patient c/o that pain medician not effective, NP TAYLA Reen Officer notified

## 2023-10-19 NOTE — DISCHARGE INSTRUCTIONS
Discharge Instructions after Total Hip Replacement  Weston Key MD  Office phone number: [phone]; extension to leave voicemail [phone] or [phone]  After hours (weekdays after 5PM or on weekends): Please call [phone], and follow the prompts to reach the on call provider      Activity:  You may put full weight on your operated leg unless otherwise instructed  Walk with a walker or two crutches for 2 weeks after surgery, then plan to go to a single point cane or single crutch for 2 weeks after that. The reason to walk with an assistive device is to avoid losing your balance and falling  Elevate your operated extremity (\"toes above nose\") throughout the day to decrease swelling and pain  Ice your operated hip multiple (3-4) times a day to decrease swelling and pain. Use a bag of ice or frozen vegetables inside a towel, placed onto the skin. This should be done for about 20-30 minutes at a time, with at least 20-30 minutes before the next icing session  Unless otherwise indicated, we will plan on starting physical therapy at your 2 week postoperative visit. You may have been given a prescription for PT before the surgery or at the time of discharge from the hospital  Remember your hip precautions reviewed with you by your physical therapist while in the hospital. In general, avoid extremes of range of motion for the first couple of months after srugery. Be sure to avoid those activities that are recommended against to avoid hip dislocation. Physical Therapy: We may have you undergo physical therapy after your operation. If so, you should have been provided a prescription for PT that details the goals that I have for the physical therapist after surgery. If you do PT, you should be attending PT 2x per week for 4 weeks after surgery, starting a couple of weeks after the date of surgery. Be sure to be performing home exercises to consolidate the gains you are making in PT.  The best exercise after total with someone, please leave a message. **    **If you need to reach someone after hours (weekends or after 5PM on weekdays), please call [phone] and follow the prompts to reach the on call provider**    When to Go to the Emergency Department or Seek Emergency Care:   You have chest pain  You are having difficulty breathing  You are having difficulty swallowing, or feel your face or throat are swelling  You have weakness on one side of your body, have difficulty seeing, facial droop, or other signs of a stroke

## 2023-10-20 VITALS
DIASTOLIC BLOOD PRESSURE: 88 MMHG | BODY MASS INDEX: 33.43 KG/M2 | RESPIRATION RATE: 16 BRPM | SYSTOLIC BLOOD PRESSURE: 100 MMHG | TEMPERATURE: 97.5 F | WEIGHT: 181.66 LBS | OXYGEN SATURATION: 95 % | HEIGHT: 62 IN | HEART RATE: 81 BPM

## 2023-10-20 PROCEDURE — 1100000000 HC RM PRIVATE

## 2023-10-20 PROCEDURE — 2580000003 HC RX 258: Performed by: ORTHOPAEDIC SURGERY

## 2023-10-20 PROCEDURE — 6370000000 HC RX 637 (ALT 250 FOR IP): Performed by: NURSE PRACTITIONER

## 2023-10-20 PROCEDURE — 94761 N-INVAS EAR/PLS OXIMETRY MLT: CPT

## 2023-10-20 PROCEDURE — 6370000000 HC RX 637 (ALT 250 FOR IP): Performed by: ORTHOPAEDIC SURGERY

## 2023-10-20 PROCEDURE — 97116 GAIT TRAINING THERAPY: CPT

## 2023-10-20 PROCEDURE — 97530 THERAPEUTIC ACTIVITIES: CPT

## 2023-10-20 RX ORDER — HYDROMORPHONE HYDROCHLORIDE 2 MG/1
1-2 TABLET ORAL EVERY 4 HOURS PRN
Qty: 42 TABLET | Refills: 0 | Status: SHIPPED | OUTPATIENT
Start: 2023-10-20 | End: 2023-10-27

## 2023-10-20 RX ADMIN — HYDROMORPHONE HYDROCHLORIDE 1 MG: 2 TABLET ORAL at 09:58

## 2023-10-20 RX ADMIN — ASPIRIN 81 MG: 81 TABLET, COATED ORAL at 09:34

## 2023-10-20 RX ADMIN — DULOXETINE HYDROCHLORIDE 30 MG: 30 CAPSULE, DELAYED RELEASE ORAL at 09:34

## 2023-10-20 RX ADMIN — ACETAMINOPHEN 650 MG: 325 TABLET ORAL at 04:06

## 2023-10-20 RX ADMIN — SODIUM CHLORIDE, PRESERVATIVE FREE 10 ML: 5 INJECTION INTRAVENOUS at 09:28

## 2023-10-20 RX ADMIN — POLYETHYLENE GLYCOL 3350 17 G: 17 POWDER, FOR SOLUTION ORAL at 09:29

## 2023-10-20 RX ADMIN — ROSUVASTATIN CALCIUM 10 MG: 10 TABLET, COATED ORAL at 09:34

## 2023-10-20 RX ADMIN — HYDROMORPHONE HYDROCHLORIDE 2 MG: 2 TABLET ORAL at 13:37

## 2023-10-20 RX ADMIN — PANTOPRAZOLE SODIUM 40 MG: 40 TABLET, DELAYED RELEASE ORAL at 05:29

## 2023-10-20 RX ADMIN — TROSPIUM CHLORIDE 20 MG: 20 TABLET, FILM COATED ORAL at 16:07

## 2023-10-20 RX ADMIN — MELOXICAM 7.5 MG: 7.5 TABLET ORAL at 09:38

## 2023-10-20 RX ADMIN — ACETAMINOPHEN 650 MG: 325 TABLET ORAL at 16:07

## 2023-10-20 RX ADMIN — ACETAMINOPHEN 650 MG: 325 TABLET ORAL at 09:33

## 2023-10-20 ASSESSMENT — PAIN DESCRIPTION - ORIENTATION
ORIENTATION: LEFT
ORIENTATION: LEFT

## 2023-10-20 ASSESSMENT — PAIN SCALES - GENERAL
PAINLEVEL_OUTOF10: 7
PAINLEVEL_OUTOF10: 3
PAINLEVEL_OUTOF10: 5
PAINLEVEL_OUTOF10: 5
PAINLEVEL_OUTOF10: 4

## 2023-10-20 ASSESSMENT — PAIN DESCRIPTION - DESCRIPTORS
DESCRIPTORS: BURNING;ACHING
DESCRIPTORS: ACHING;SORE

## 2023-10-20 ASSESSMENT — PAIN DESCRIPTION - LOCATION
LOCATION: HIP
LOCATION: LEG

## 2023-10-20 NOTE — PLAN OF CARE
Problem: Physical Therapy - Adult  Goal: By Discharge: Performs mobility at highest level of function for planned discharge setting. See evaluation for individualized goals. Description: FUNCTIONAL STATUS PRIOR TO ADMISSION: Patient was modified independent using a single point cane for functional mobility. HOME SUPPORT PRIOR TO ADMISSION: The patient lived with her daughter but did not require assistance. Physical Therapy Goals  Initiated 10/18/2023  1. Patient will move from supine to sit and sit to supine in bed with modified independence within 4 day(s). 2.  Patient will perform sit to stand with modified independence within 4 day(s). 3.  Patient will transfer from bed to chair and chair to bed with modified independence using the least restrictive device within 4 day(s). 4.  Patient will ambulate with modified independence for 150 feet with the least restrictive device within 4 day(s). 5.  Patient will ascend/descend 3 stairs with 1 handrail(s) with modified independence within 4 day(s). 6.  Patient will perform RADHA home exercise program per protocol with independence within 4 days. 10/20/2023 1551 by Rj Dale, PT  Outcome: Progressing  10/20/2023 1210 by Rj Dale, PT  Outcome: Progressing, Adequate for discharge     PHYSICAL THERAPY TREATMENT    Patient: Gala Booth (82 y.o. female)  Date: 10/20/2023  Diagnosis: Primary osteoarthritis of left hip [M16.12]  Arthritis of left hip [M16.12] Arthritis of left hip  Procedure(s) (LRB):  LEFT TOTAL HIP REPLACEMENT ANTERIOR APPROACH (Left) 2 Days Post-Op  Precautions:                      ASSESSMENT:  Patient continues to benefit from skilled PT services and is progressing towards goals. Patient reporting improved pain control this afternoon. Patient mobilizing well overall,  needing only SBA for transfers and gait with RW x 75'.   Completed stair training again with lateral approach this time with improved tolerance pain wise and patient confidence. Patient initiated reciprocal gait pattern this session with improved fluidity of gait and pain control. Did need MIN A with LE back to supine but coming to EOB is only SBA. Patient's daughter will be with her for bed mobility assistance. Reviewed all therex (performing properly), car transfers, activity modification and pacing with patient having no further questions. Patient cleared from mobility standpoint with HHPT recommended to further promote functional mobility independence and tolerance. PLAN:  Patient continues to benefit from skilled intervention to address the above impairments. Continue treatment per established plan of care. Recommendation for discharge: (in order for the patient to meet his/her long term goals): Therapy 2 days/week in the home    Other factors to consider for discharge: no additional factors    IF patient discharges home will need the following DME: patient owns DME required for discharge       SUBJECTIVE:   Patient stated, \"I think I'll be fine at home, the pain is getting better. \"    OBJECTIVE DATA SUMMARY:   Critical Behavior:          Functional Mobility Training:  Bed Mobility:  Bed Mobility Training  Supine to Sit: Stand-by assistance; Adaptive equipment; Additional time  Scooting: Stand-by assistance  Transfers:  Transfer Training  Overall Level of Assistance: Stand-by assistance  Interventions: Verbal cues  Sit to Stand: Stand-by assistance  Stand to Sit: Stand-by assistance  Bed to Chair: Stand-by assistance; Additional time  Balance:  Balance  Sitting: Intact  Standing: With support  Standing - Static: Good;Constant support  Standing - Dynamic: Good;Constant support   Ambulation/Gait Training:     Gait  Overall Level of Assistance: Stand-by assistance  Distance (ft): 70 Feet  Assistive Device: Gait belt;Walker, rolling  Base of Support: Widened;Shift to right  Speed/Karley: Slow  Step Length: Left shortened;Right

## 2023-10-20 NOTE — PROGRESS NOTES
Discharge instructions presented to patient. All questions and concerns addressed appropriately. New medications were read and explained to patient, patient verbalized understanding. Patient aware that prescriptions have been electronically sent to their pharmacy. All IV's removed. Patient belongings packed up and with patient. Patient awaiting transport via wheelchair by volunteer services to the main entrance.

## 2023-10-20 NOTE — PLAN OF CARE
Problem: Safety - Adult  Goal: Free from fall injury  Outcome: Progressing     Problem: Pain  Goal: Verbalizes/displays adequate comfort level or baseline comfort level  Outcome: Progressing     Problem: Discharge Planning  Goal: Discharge to home or other facility with appropriate resources  Outcome: Progressing     Problem: Respiratory - Adult  Goal: Achieves optimal ventilation and oxygenation  Outcome: Progressing

## 2023-10-20 NOTE — PLAN OF CARE
controlling descent into chair with hands. Ambulated with RW x 70' but c/o mild lightheadedness, brought chair and obtained BP but normal by this time. Completed stair training with L rail and R  with CGA and only 2 cues for sequencing. BP stable after stairs and no further c/o lightheadedness. However, patient' c/o significant pain increase once back to chair, endorses concerns with d/c'ng home with this level of pain. Relayed this to RN and NP. Will see again in pm and see how pain control is in regards to mobility tolerance. PLAN:  Patient continues to benefit from skilled intervention to address the above impairments. Continue treatment per established plan of care. Recommendation for discharge: (in order for the patient to meet his/her long term goals): Therapy 2 days/week in the home and also see \"other factors to consider\" below for additional discharge concerns/needs    Other factors to consider for discharge: concern for safely navigating or managing the home environment    IF patient discharges home will need the following DME: patient owns DME required for discharge       SUBJECTIVE:   Patient stated, \"I just want the pain to be a little better before I go home. \"    OBJECTIVE DATA SUMMARY:   Critical Behavior:          Functional Mobility Training:  Bed Mobility:  Bed Mobility Training  Supine to Sit: Stand-by assistance; Adaptive equipment; Additional time  Scooting: Stand-by assistance  Transfers:  Transfer Training  Overall Level of Assistance: Stand-by assistance  Interventions: Verbal cues  Sit to Stand: Stand-by assistance  Stand to Sit: Stand-by assistance  Bed to Chair: Stand-by assistance; Additional time  Balance:  Balance  Sitting: Intact  Standing: With support  Standing - Static: Good;Constant support  Standing - Dynamic: Good;Constant support   Ambulation/Gait Training:     Gait  Overall Level of Assistance: Stand-by assistance  Distance (ft): 70 Feet  Assistive Device: Gait belt;Walker, rolling  Base of Support: Widened;Shift to right  Speed/Karley: Slow  Step Length: Left shortened;Right shortened  Stance: Left decreased  Gait Abnormalities: Antalgic;Decreased step clearance  Rail Use: Left  Stairs - Level of Assistance: Contact-guard assistance  Number of Stairs Trained: 4  Neuro Re-Education:                    Pain Ratin-8/10 L hip  Pain Intervention(s):   patient medicated for pain prior to session    Activity Tolerance:   Fair     After treatment:   Patient left in no apparent distress sitting up in chair and Call bell within reach      COMMUNICATION/EDUCATION:   The patient's plan of care was discussed with: occupational therapist and registered nurse           Todd Starr, PT  Minutes: 80

## 2023-10-20 NOTE — CARE COORDINATION
10/20/2023  8:31 AM  Care Management Progress Note      ICD-10-CM    1. Arthritis of left hip  M16.12 traMADol (ULTRAM) 50 MG tablet          RUR:  NA - OBS  Risk Level: [x]Low []Moderate []High    Transition of care plan:  Discharge pending therapy clearance. Home. No CM needs indicated. Pt to start OP PT following follow-up. Outpatient follow-up. Pt's family to transport. 10/19/23 1208   Service Assessment   Patient Orientation Alert and Oriented   Cognition Alert   History Provided By Medical Record   Primary Caregiver Self   Support Systems Children; Other (Comment)  (DTR)   [address] is: Legal Next of [address] [facility]   PCP Verified by CM Yes   Last Visit to PCP Within last 3 months   Prior Functional Level Independent in ADLs/IADLs   Current Functional Level Independent in ADLs/IADLs   Can patient return to prior living arrangement Yes   Ability to make needs known: Good   Family able to assist with home care needs: Yes   Would you like for me to discuss the discharge plan with any other family members/significant others, and if so, who?  Yes   Financial Resources Medicare   Community Resources None   Social/Functional History   Lives With Daughter   Type of [address]   Ambulation Assistance Independent   Transfer Assistance Independent   Discharge Planning   Type of Residence House   Living Arrangements Children   Current Services Prior To Admission None   Type of Home Care Services None   Patient expects to be discharged to: [facility] Discharge   Services [address] [facility] Discharge None  (Per MD, no treatment until after follow-up.)   Mode of Transport at Discharge Other (see comment)   Confirm Follow Up Transport Family

## 2023-10-25 NOTE — DISCHARGE SUMMARY
CONTINUE these medications which have NOT CHANGED    Details   chlorhexidine gluconate (ANTISEPTIC SKIN CLEANSER) 4 % SOLN external solution Use for 2 days in addition to the 3 day course as instructed by nurse at Preadmission testing (5 days total), Disp-118 mL, R-0, Normal      amLODIPine (NORVASC) 10 MG tablet Take 0.5 tablets by mouth daily, Disp-30 tablet, R-5Normal      alendronate (FOSAMAX) 70 MG tablet Take 1 tablet by mouth every 7 daysHistorical Med      baclofen (LIORESAL) 10 MG tablet Take 1 tablet by mouth nightlyHistorical Med      solifenacin (VESICARE) 10 MG tablet Take 0.5 tablets by mouth nightlyHistorical Med      Oxymetazoline HCl (NASAL SPRAY NA) 1 spray by Nasal route as neededHistorical Med      docusate sodium (COLACE) 100 MG capsule Take 1 capsule by mouth as needed for ConstipationHistorical Med      rosuvastatin (CRESTOR) 10 MG tablet Take 1 tablet by mouth daily, Disp-90 tablet, R-3Normal      bumetanide (BUMEX) 0.5 MG tablet Take 1 tablet by mouth dailyHistorical Med      DULoxetine (CYMBALTA) 30 MG extended release capsule Take 1 capsule by mouth dailyHistorical Med      losartan (COZAAR) 100 MG tablet TAKE 1 TABLET BY MOUTH DAILY. Historical Med      metoprolol succinate (TOPROL XL) 25 MG extended release tablet Take 1 tablet by mouth dailyHistorical Med      omeprazole (PRILOSEC) 40 MG delayed release capsule Take 1 capsule by mouth dailyHistorical Med           STOP taking these medications       acetaminophen (TYLENOL) 650 MG extended release tablet Comments:   Reason for Stopping:         traMADol (ULTRAM) 50 MG tablet Comments:   Reason for Stopping:               Discharged to: Home      Signed:  Gabi Kiran MD  10/25/2023  1:28 PM

## 2024-03-20 RX ORDER — ROSUVASTATIN CALCIUM 10 MG/1
10 TABLET, COATED ORAL DAILY
Qty: 90 TABLET | Refills: 0 | Status: SHIPPED | OUTPATIENT
Start: 2024-03-20

## 2024-04-09 ENCOUNTER — OFFICE VISIT (OUTPATIENT)
Age: 84
End: 2024-04-09
Payer: MEDICARE

## 2024-04-09 VITALS
BODY MASS INDEX: 30.33 KG/M2 | HEART RATE: 62 BPM | SYSTOLIC BLOOD PRESSURE: 102 MMHG | OXYGEN SATURATION: 98 % | DIASTOLIC BLOOD PRESSURE: 70 MMHG | HEIGHT: 62 IN | RESPIRATION RATE: 16 BRPM | WEIGHT: 164.8 LBS

## 2024-04-09 DIAGNOSIS — G47.33 OBSTRUCTIVE SLEEP APNEA (ADULT) (PEDIATRIC): ICD-10-CM

## 2024-04-09 DIAGNOSIS — R00.2 PALPITATION: Primary | ICD-10-CM

## 2024-04-09 DIAGNOSIS — I10 ESSENTIAL (PRIMARY) HYPERTENSION: ICD-10-CM

## 2024-04-09 PROCEDURE — 3078F DIAST BP <80 MM HG: CPT | Performed by: SPECIALIST

## 2024-04-09 PROCEDURE — G8427 DOCREV CUR MEDS BY ELIG CLIN: HCPCS | Performed by: SPECIALIST

## 2024-04-09 PROCEDURE — 3074F SYST BP LT 130 MM HG: CPT | Performed by: SPECIALIST

## 2024-04-09 PROCEDURE — 99214 OFFICE O/P EST MOD 30 MIN: CPT | Performed by: SPECIALIST

## 2024-04-09 PROCEDURE — G8417 CALC BMI ABV UP PARAM F/U: HCPCS | Performed by: SPECIALIST

## 2024-04-09 PROCEDURE — 1036F TOBACCO NON-USER: CPT | Performed by: SPECIALIST

## 2024-04-09 PROCEDURE — 1090F PRES/ABSN URINE INCON ASSESS: CPT | Performed by: SPECIALIST

## 2024-04-09 PROCEDURE — 1123F ACP DISCUSS/DSCN MKR DOCD: CPT | Performed by: SPECIALIST

## 2024-04-09 PROCEDURE — G8399 PT W/DXA RESULTS DOCUMENT: HCPCS | Performed by: SPECIALIST

## 2024-04-09 NOTE — PROGRESS NOTES
CARDIOLOGY OFFICE NOTE    Sharif Bello MD, [facility]    [address], [place] [address]  Phone [phone]; Fax [phone]  Mobile [phone]   Voice Mail [phone]    Primary care: Godfrey Bradshaw MD       ATTENTION:   This medical record was transcribed using an electronic medical records/speech recognition system.  Although proofread, it may and can contain electronic, spelling and other errors.  Corrections may be executed at a later time.  Please feel free to contact us for any clarifications as needed.     Janessa Varela is a 82 y.o. female with  referred for  dizziness, edema, dyslipidemia      Cardiac risk factors: family history, dyslipidemia, hypertension, post-menopausal   I have personally obtained the history from the patient.        HISTORY OF PRESENTING ILLNESS    Overall she is doing well with no cardiac issues.  She denies any palpitations.  She had her hip surgery and had no cardiac issues during that.  All of her cardiac testing and been normal and her LDL was 47.  She states that her legs are doing well and she is followed by Dr. Geller for vascular issues.           ACTIVE PROBLEM LIST     Patient Active Problem List    Diagnosis Date Noted    Arthritis of left hip 10/18/2023    Left sided numbness 02/05/2023    Peripheral vascular disease (HCC) 10/15/2019    DDD (degenerative disc disease), lumbar 05/08/2018    Obesity, morbid (HCC) 12/19/2017    FH: breast cancer in first degree relative 12/19/2017    Allergic to tetanus vaccine 08/09/2016    Hypercalcemia 02/09/2016    Anxiety 04/28/2015    Urinary incontinence 11/18/2014    Advance directive discussed with patient 10/07/2014    Health care maintenance 10/07/2014    Osteoporosis 10/15/2012    Lipid disorder 10/10/2012    Knee pain, right 10/09/2012    Osteoarthritis 10/09/2012    Hyperglycemia 03/15/2012    Essential hypertension with goal blood pressure less than 140/90 03/17/2011    Colon

## 2024-04-09 NOTE — PROGRESS NOTES
NAME Janessa Varela         1940      MRN    [id number]      LAST OFFICE APPOINTMENT: 2023     DIAGNOSIS    ICD-10-CM    1. Palpitation  R00.2       2. Essential (primary) hypertension  I10       3. Obstructive sleep apnea (adult) (pediatric)  G47.33           HOME MEDICATION  Current Outpatient Medications   Medication Sig    rosuvastatin (CRESTOR) 10 MG tablet TAKE 1 TABLET EVERY DAY    acetaminophen (TYLENOL) 500 MG tablet Take 1 tablet by mouth in the morning, at noon, and at bedtime for 10 days    aspirin 81 MG chewable tablet Take 1 tablet by mouth in the morning and at bedtime    amLODIPine (NORVASC) 10 MG tablet Take 0.5 tablets by mouth daily    alendronate (FOSAMAX) 70 MG tablet Take 1 tablet by mouth every 7 days    baclofen (LIORESAL) 10 MG tablet Take 1 tablet by mouth nightly    solifenacin (VESICARE) 10 MG tablet Take 0.5 tablets by mouth nightly    Oxymetazoline HCl (NASAL SPRAY NA) 1 spray by Nasal route as needed    docusate sodium (COLACE) 100 MG capsule Take 1 capsule by mouth as needed for Constipation    bumetanide (BUMEX) 0.5 MG tablet Take 1 tablet by mouth daily    DULoxetine (CYMBALTA) 30 MG extended release capsule Take 1 capsule by mouth daily    losartan (COZAAR) 100 MG tablet TAKE 1 TABLET BY MOUTH DAILY.    metoprolol succinate (TOPROL XL) 25 MG extended release tablet Take 1 tablet by mouth daily    omeprazole (PRILOSEC) 40 MG delayed release capsule Take 1 capsule by mouth daily    meloxicam (MOBIC) 7.5 MG tablet Take 1 tablet by mouth daily (Patient not taking: Reported on 2024)    chlorhexidine gluconate (ANTISEPTIC SKIN CLEANSER) 4 % SOLN external solution Use for 2 days in addition to the 3 day course as instructed by nurse at Preadmission testing (5 days total) (Patient not taking: Reported on 2024)     No current facility-administered medications for this visit.       VITAL SIGNS  Wt Readings from Last 3 Encounters:   24 74.8 kg (164 lb 12.8 oz)

## 2024-04-09 NOTE — PATIENT INSTRUCTIONS

## 2024-06-03 RX ORDER — ROSUVASTATIN CALCIUM 10 MG/1
10 TABLET, COATED ORAL DAILY
Qty: 90 TABLET | Refills: 2 | Status: SHIPPED | OUTPATIENT
Start: 2024-06-03

## 2024-06-18 ENCOUNTER — OFFICE VISIT (OUTPATIENT)
Age: 84
End: 2024-06-18
Payer: MEDICARE

## 2024-06-18 VITALS
BODY MASS INDEX: 29.44 KG/M2 | WEIGHT: 160 LBS | SYSTOLIC BLOOD PRESSURE: 124 MMHG | RESPIRATION RATE: 16 BRPM | HEART RATE: 96 BPM | OXYGEN SATURATION: 99 % | DIASTOLIC BLOOD PRESSURE: 76 MMHG | HEIGHT: 62 IN

## 2024-06-18 DIAGNOSIS — I10 ESSENTIAL (PRIMARY) HYPERTENSION: ICD-10-CM

## 2024-06-18 DIAGNOSIS — E78.9 DISORDER OF LIPOPROTEIN METABOLISM, UNSPECIFIED: ICD-10-CM

## 2024-06-18 DIAGNOSIS — G47.33 OBSTRUCTIVE SLEEP APNEA (ADULT) (PEDIATRIC): ICD-10-CM

## 2024-06-18 DIAGNOSIS — R00.2 PALPITATION: Primary | ICD-10-CM

## 2024-06-18 PROCEDURE — 3078F DIAST BP <80 MM HG: CPT | Performed by: SPECIALIST

## 2024-06-18 PROCEDURE — 3074F SYST BP LT 130 MM HG: CPT | Performed by: SPECIALIST

## 2024-06-18 PROCEDURE — G8427 DOCREV CUR MEDS BY ELIG CLIN: HCPCS | Performed by: SPECIALIST

## 2024-06-18 PROCEDURE — G8417 CALC BMI ABV UP PARAM F/U: HCPCS | Performed by: SPECIALIST

## 2024-06-18 PROCEDURE — G8399 PT W/DXA RESULTS DOCUMENT: HCPCS | Performed by: SPECIALIST

## 2024-06-18 PROCEDURE — 99214 OFFICE O/P EST MOD 30 MIN: CPT | Performed by: SPECIALIST

## 2024-06-18 PROCEDURE — 1123F ACP DISCUSS/DSCN MKR DOCD: CPT | Performed by: SPECIALIST

## 2024-06-18 PROCEDURE — 1036F TOBACCO NON-USER: CPT | Performed by: SPECIALIST

## 2024-06-18 PROCEDURE — 1090F PRES/ABSN URINE INCON ASSESS: CPT | Performed by: SPECIALIST

## 2024-06-18 NOTE — PROGRESS NOTES
had concerns including Palpitations and Hypertension.    Vitals:    24 1521   BP: 124/76   Site: Left Upper Arm   Position: Sitting   Pulse: 96   Resp: 16   SpO2: 99%   Weight: 72.6 kg (160 lb)   Height: 1.575 m (5' 2\")        Chest pain No    Refills No        1. Have you been to the ER, urgent care clinic since your last visit? No       Hospitalized since your last visit? No       Where?        Date?      NAME Janessa Varela         1940      MRN    [id number]      LAST OFFICE APPOINTMENT: 2024     DIAGNOSIS    ICD-10-CM    1. Palpitation  R00.2       2. Essential (primary) hypertension  I10       3. Obstructive sleep apnea (adult) (pediatric)  G47.33       4. 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           HOME MEDICATION  Current Outpatient Medications   Medication Sig    rosuvastatin (CRESTOR) 10 MG tablet TAKE 1 TABLET EVERY DAY    acetaminophen (TYLENOL) 500 MG tablet Take 1 tablet by mouth in the morning, at noon, and at bedtime for 10 days    aspirin 81 MG chewable tablet Take 1 tablet by mouth in the morning and at bedtime (Patient taking differently: Take 1 tablet by mouth daily)    amLODIPine (NORVASC) 10 MG tablet Take 0.5 tablets by mouth daily    alendronate (FOSAMAX) 70 MG tablet Take 1 tablet by mouth every 7 days    baclofen (LIORESAL) 10 MG tablet Take 1 tablet by mouth nightly    solifenacin (VESICARE) 10 MG tablet Take 0.5 tablets by mouth nightly    Oxymetazoline HCl (NASAL SPRAY NA) 1 spray by Nasal route as needed    docusate sodium (COLACE) 100 MG capsule Take 1 capsule by mouth as needed for Constipation    bumetanide (BUMEX) 0.5 MG tablet Take 1 tablet by mouth daily    DULoxetine (CYMBALTA) 30 MG extended release capsule Take 1 capsule by mouth daily    losartan (COZAAR) 100 MG tablet TAKE 1 TABLET BY MOUTH DAILY.    metoprolol succinate (TOPROL XL) 25 MG extended release tablet Take 1 tablet by mouth daily    omeprazole (PRILOSEC) 40 MG delayed 
deformities  Neuro: A&Ox3, speech clear, he is now walking on her own without a cane, cooperative, no focal neurologic deficits  Skin: Skin color is normal. No rashes or lesions. Non diaphoretic, moist.        EKG: Date: (10/4/2023) normal sinus rhythm     DIAGNOSTIC DATA     1. Stress Echo    8/11/09 - normal       2. Echo    2/11/16 - EF 60%    10/15/19-EF 61 - 65%, mild AV sclerosis with no significant stenosis    2/6/23 EF 60 - 65%, Tricuspid AV-Mild sclerosis of AV cusp.      3. EKG    2/8/16 - NSR       4. Cholesterol    3/7/19- , HDL 48, ,     9/10/19- , HDL 47, LDL 75,     12/30/22- , HDL 53, ,    2/6/23- , HDL 53, .4,    12/5/23- , HDL 68, LDL 47,      5. TSH    10/10/15 - normal       6. Cardiolite    3/21-22/16 No Ischemia       7. Loop recorder    (2/20/16-3/20/16): 52->78 BPM with one episode of tachycardia to 151   4/14-27/23- SR/ST average HR 72, min 53, max 139      8. LE Venous Doppler    8/1/19- No evidence of deep vein thrombosis or venous obstruction within the lower extremities bilaterally    9. DAMIAN   11/6/19-Right: Significant peripheral vascular disease with a moderate reduction of the DAMIAN (0.68) post exercise,   Left:  Significant peripheral vascular disease with a moderate reduction of the DAMIAN (0.76) post exercise.   1/16/23- evidence of peripheral vascular disease within the bilateral lower extremity, right > left., right resting ankle/brachial index is moderately reduced at 0.77 and there is a 31% decrease in the right ankle pressure post low level exercise.   left resting ankle/brachial index is mildly reduced at 0.92. Rise in left ankle pressure post low level exercise suggests adequate perfusion in this extremity              LABORATORY DATA       Lab Results   Component Value Date/Time    WBC 13.7 10/19/2023 02:37 AM    HGB 10.1 10/19/2023 02:37 AM    HCT 32.4 10/19/2023 02:37 AM     10/19/2023 
coccyx

## 2024-09-13 ENCOUNTER — TRANSCRIBE ORDERS (OUTPATIENT)
Facility: HOSPITAL | Age: 84
End: 2024-09-13

## 2024-09-13 DIAGNOSIS — Z12.31 ENCOUNTER FOR SCREENING MAMMOGRAM FOR MALIGNANT NEOPLASM OF BREAST: Primary | ICD-10-CM

## 2024-10-10 ENCOUNTER — HOSPITAL ENCOUNTER (OUTPATIENT)
Facility: HOSPITAL | Age: 84
Discharge: HOME | End: 2024-10-13
Payer: MEDICARE

## 2024-10-10 VITALS — HEIGHT: 62 IN | BODY MASS INDEX: 29.44 KG/M2 | WEIGHT: 160 LBS

## 2024-10-10 DIAGNOSIS — Z12.31 ENCOUNTER FOR SCREENING MAMMOGRAM FOR MALIGNANT NEOPLASM OF BREAST: ICD-10-CM

## 2024-10-10 PROCEDURE — 77067 SCR MAMMO BI INCL CAD: CPT

## 2025-01-08 RX ORDER — ROSUVASTATIN CALCIUM 10 MG/1
10 TABLET, COATED ORAL DAILY
Qty: 90 TABLET | Refills: 0 | Status: SHIPPED | OUTPATIENT
Start: 2025-01-08

## 2025-02-03 ENCOUNTER — TRANSCRIBE ORDERS (OUTPATIENT)
Facility: HOSPITAL | Age: 85
End: 2025-02-03

## 2025-02-03 DIAGNOSIS — M81.0 POSTMENOPAUSAL OSTEOPOROSIS: Primary | ICD-10-CM

## 2025-04-28 ENCOUNTER — TRANSCRIBE ORDERS (OUTPATIENT)
Facility: HOSPITAL | Age: 85
End: 2025-04-28

## 2025-04-28 DIAGNOSIS — G95.9 CERVICAL MYELOPATHY (HCC): Primary | ICD-10-CM

## 2025-04-28 DIAGNOSIS — M54.2 CERVICALGIA: ICD-10-CM

## 2025-04-28 DIAGNOSIS — M54.12 RADICULITIS OF RIGHT CERVICAL REGION: ICD-10-CM

## (undated) DEVICE — BONE PREPARATION KIT: Brand: BIOPREP

## (undated) DEVICE — ELECTRODE BLDE L4IN NONINSULATED EDGE

## (undated) DEVICE — SUTURE STRATAFIX SYMMETRIC SZ 1 L18IN ABSRB VLT CT1 L36CM SXPP1A404

## (undated) DEVICE — STRYKER PERFORMANCE SERIES SAGITTAL BLADE: Brand: STRYKER PERFORMANCE SERIES

## (undated) DEVICE — DISPOSABLE ORTHOPAEDIC PROTECTOR: Brand: ALEXIS ® ORTHOPAEDIC PROTECTOR

## (undated) DEVICE — KIT POS FOAM HANA TBL

## (undated) DEVICE — 3M™ STERI-DRAPE™ U-DRAPE 1015: Brand: STERI-DRAPE™

## (undated) DEVICE — SUTURE VCRL + SZ 1-0 L36IN ABSRB UD CTX 1/2 CIR TAPR PNT VCP977H

## (undated) DEVICE — TAPE,CLOTH/SILK,CURAD,3"X10YD,LF,40/CS: Brand: CURAD

## (undated) DEVICE — SUTURE ETHBND EXCEL SZ 5 L30IN NONABSORBABLE GRN L40MM V-37 MB66G

## (undated) DEVICE — PENCIL SMK EVAC ALL IN 1 DSGN ENH VISIBILITY IMPROVED AIR

## (undated) DEVICE — SUTURE VCRL SZ 2-0 L36IN ABSRB UD L36MM CT-1 1/2 CIR J945H

## (undated) DEVICE — SOLUTION IRRIG 3000ML 0.9% SOD CHL USP UROMATIC PLAS CONT

## (undated) DEVICE — SOLUTION IRRIG 1000ML STRL H2O USP PLAS POUR BTL

## (undated) DEVICE — C-ARM: Brand: UNBRANDED

## (undated) DEVICE — SYRINGE MED 30ML STD CLR PLAS LUERLOCK TIP N CTRL DISP

## (undated) DEVICE — SOLUTION SCRB 4% CHG RED ANTIMIC SKIN CLN PREOPERATIVE DISP

## (undated) DEVICE — 450 ML BOTTLE OF 0.05% CHLORHEXIDINE GLUCONATE IN 99.95% STERILE WATER FOR IRRIGATION, USP AND APPLICATOR.: Brand: IRRISEPT ANTIMICROBIAL WOUND LAVAGE

## (undated) DEVICE — HOOD, PEEL-AWAY: Brand: FLYTE

## (undated) DEVICE — YANKAUER,OPEN TIP,W/O VENT,STERILE: Brand: MEDLINE INDUSTRIES, INC.

## (undated) DEVICE — DRESSING FOAM 4X8IN DISP POSTOP MEPILEX BORD AG

## (undated) DEVICE — SPONGE GZ W4XL4IN COT 12 PLY TYP VII WVN C FLD DSGN STERILE

## (undated) DEVICE — 4-PORT MANIFOLD: Brand: NEPTUNE 2

## (undated) DEVICE — SUTURE MCRYL SZ 3-0 L27IN ABSRB UD L24MM PS-1 3/8 CIR PRIM Y936H

## (undated) DEVICE — TOTAL JOINT-SFMC: Brand: MEDLINE INDUSTRIES, INC.

## (undated) DEVICE — GLOVE SURG SZ 8 L12IN FNGR THK79MIL GRN LTX FREE

## (undated) DEVICE — Device: Brand: JELCO

## (undated) DEVICE — STRIP,CLOSURE,WOUND,MEDI-STRIP,1/2X4: Brand: MEDLINE

## (undated) DEVICE — SHEET, DRAPE, SPLIT, STERILE: Brand: MEDLINE

## (undated) DEVICE — 6619 2 PTNT ISO SYS INCISE AREA&LT;(&GT;&&LT;)&GT;P: Brand: STERI-DRAPE™ IOBAN™ 2

## (undated) DEVICE — DRAPE,REIN 53X77,STERILE: Brand: MEDLINE

## (undated) DEVICE — COVER,MAYO STAND,STERILE: Brand: MEDLINE

## (undated) DEVICE — GLOVE ORTHO 8   MSG9480